# Patient Record
Sex: FEMALE | Race: WHITE | NOT HISPANIC OR LATINO | Employment: OTHER | URBAN - METROPOLITAN AREA
[De-identification: names, ages, dates, MRNs, and addresses within clinical notes are randomized per-mention and may not be internally consistent; named-entity substitution may affect disease eponyms.]

---

## 2017-01-20 ENCOUNTER — TRANSCRIBE ORDERS (OUTPATIENT)
Dept: SLEEP CENTER | Facility: CLINIC | Age: 77
End: 2017-01-20

## 2017-01-20 ENCOUNTER — ALLSCRIPTS OFFICE VISIT (OUTPATIENT)
Dept: OTHER | Facility: OTHER | Age: 77
End: 2017-01-20

## 2017-01-20 ENCOUNTER — HOSPITAL ENCOUNTER (OUTPATIENT)
Dept: SLEEP CENTER | Facility: CLINIC | Age: 77
Discharge: HOME | End: 2017-01-20
Payer: MEDICARE

## 2017-01-20 DIAGNOSIS — G47.33 OSA (OBSTRUCTIVE SLEEP APNEA): Primary | ICD-10-CM

## 2017-01-25 ENCOUNTER — HOSPITAL ENCOUNTER (OUTPATIENT)
Dept: SLEEP CENTER | Facility: CLINIC | Age: 77
Discharge: HOME | End: 2017-01-25
Payer: MEDICARE

## 2017-01-25 DIAGNOSIS — G47.33 OSA (OBSTRUCTIVE SLEEP APNEA): ICD-10-CM

## 2017-01-25 PROCEDURE — 95811 POLYSOM 6/>YRS CPAP 4/> PARM: CPT

## 2017-02-08 ENCOUNTER — APPOINTMENT (OUTPATIENT)
Dept: PHYSICAL THERAPY | Facility: CLINIC | Age: 77
End: 2017-02-08
Payer: MEDICARE

## 2017-02-08 PROCEDURE — G8979 MOBILITY GOAL STATUS: HCPCS

## 2017-02-08 PROCEDURE — 97161 PT EVAL LOW COMPLEX 20 MIN: CPT

## 2017-02-08 PROCEDURE — G8978 MOBILITY CURRENT STATUS: HCPCS

## 2017-02-13 ENCOUNTER — APPOINTMENT (OUTPATIENT)
Dept: PHYSICAL THERAPY | Facility: CLINIC | Age: 77
End: 2017-02-13
Payer: MEDICARE

## 2017-02-13 PROCEDURE — 97140 MANUAL THERAPY 1/> REGIONS: CPT

## 2017-02-13 PROCEDURE — 97110 THERAPEUTIC EXERCISES: CPT

## 2017-02-15 ENCOUNTER — APPOINTMENT (OUTPATIENT)
Dept: PHYSICAL THERAPY | Facility: CLINIC | Age: 77
End: 2017-02-15
Payer: MEDICARE

## 2017-02-15 PROCEDURE — 97110 THERAPEUTIC EXERCISES: CPT

## 2017-02-16 ENCOUNTER — TRANSCRIBE ORDERS (OUTPATIENT)
Dept: LAB | Facility: CLINIC | Age: 77
End: 2017-02-16

## 2017-02-16 ENCOUNTER — APPOINTMENT (OUTPATIENT)
Dept: LAB | Facility: CLINIC | Age: 77
End: 2017-02-16
Payer: MEDICARE

## 2017-02-16 DIAGNOSIS — R10.11 ABDOMINAL PAIN, RIGHT UPPER QUADRANT: Primary | ICD-10-CM

## 2017-02-16 DIAGNOSIS — R94.5 NONSPECIFIC ABNORMAL RESULTS OF LIVER FUNCTION STUDY: ICD-10-CM

## 2017-02-16 LAB
ALBUMIN SERPL BCP-MCNC: 3.7 G/DL (ref 3.5–5)
ALP SERPL-CCNC: 119 U/L (ref 46–116)
ALT SERPL W P-5'-P-CCNC: 57 U/L (ref 12–78)
AST SERPL W P-5'-P-CCNC: 22 U/L (ref 5–45)
BILIRUB DIRECT SERPL-MCNC: 0.14 MG/DL (ref 0–0.2)
BILIRUB SERPL-MCNC: 0.48 MG/DL (ref 0.2–1)
PROT SERPL-MCNC: 7.5 G/DL (ref 6.4–8.2)

## 2017-02-16 PROCEDURE — 36415 COLL VENOUS BLD VENIPUNCTURE: CPT

## 2017-02-16 PROCEDURE — 80076 HEPATIC FUNCTION PANEL: CPT

## 2017-02-20 ENCOUNTER — APPOINTMENT (OUTPATIENT)
Dept: PHYSICAL THERAPY | Facility: CLINIC | Age: 77
End: 2017-02-20
Payer: MEDICARE

## 2017-02-22 ENCOUNTER — APPOINTMENT (OUTPATIENT)
Dept: PHYSICAL THERAPY | Facility: CLINIC | Age: 77
End: 2017-02-22
Payer: MEDICARE

## 2017-02-22 PROCEDURE — 97110 THERAPEUTIC EXERCISES: CPT

## 2017-02-24 ENCOUNTER — APPOINTMENT (OUTPATIENT)
Dept: PHYSICAL THERAPY | Facility: CLINIC | Age: 77
End: 2017-02-24
Payer: MEDICARE

## 2017-02-24 PROCEDURE — 97110 THERAPEUTIC EXERCISES: CPT

## 2017-02-27 ENCOUNTER — APPOINTMENT (OUTPATIENT)
Dept: PHYSICAL THERAPY | Facility: CLINIC | Age: 77
End: 2017-02-27
Payer: MEDICARE

## 2017-02-27 PROCEDURE — 97110 THERAPEUTIC EXERCISES: CPT

## 2017-03-01 ENCOUNTER — APPOINTMENT (OUTPATIENT)
Dept: PHYSICAL THERAPY | Facility: CLINIC | Age: 77
End: 2017-03-01
Payer: MEDICARE

## 2017-03-01 PROCEDURE — 97110 THERAPEUTIC EXERCISES: CPT

## 2017-03-06 ENCOUNTER — APPOINTMENT (OUTPATIENT)
Dept: PHYSICAL THERAPY | Facility: CLINIC | Age: 77
End: 2017-03-06
Payer: MEDICARE

## 2017-03-06 PROCEDURE — 97110 THERAPEUTIC EXERCISES: CPT

## 2017-03-08 ENCOUNTER — APPOINTMENT (OUTPATIENT)
Dept: PHYSICAL THERAPY | Facility: CLINIC | Age: 77
End: 2017-03-08
Payer: MEDICARE

## 2017-03-08 PROCEDURE — 97110 THERAPEUTIC EXERCISES: CPT

## 2017-03-08 PROCEDURE — G8978 MOBILITY CURRENT STATUS: HCPCS

## 2017-03-08 PROCEDURE — G8979 MOBILITY GOAL STATUS: HCPCS

## 2017-03-13 ENCOUNTER — APPOINTMENT (OUTPATIENT)
Dept: PHYSICAL THERAPY | Facility: CLINIC | Age: 77
End: 2017-03-13
Payer: MEDICARE

## 2017-03-13 PROCEDURE — 97110 THERAPEUTIC EXERCISES: CPT

## 2017-03-15 ENCOUNTER — APPOINTMENT (OUTPATIENT)
Dept: PHYSICAL THERAPY | Facility: CLINIC | Age: 77
End: 2017-03-15
Payer: MEDICARE

## 2017-03-16 ENCOUNTER — TRANSCRIBE ORDERS (OUTPATIENT)
Dept: LAB | Facility: CLINIC | Age: 77
End: 2017-03-16

## 2017-03-16 ENCOUNTER — APPOINTMENT (OUTPATIENT)
Dept: LAB | Facility: CLINIC | Age: 77
End: 2017-03-16
Payer: MEDICARE

## 2017-03-16 DIAGNOSIS — R94.5 NONSPECIFIC ABNORMAL RESULTS OF LIVER FUNCTION STUDY: Primary | ICD-10-CM

## 2017-03-16 LAB
ALBUMIN SERPL BCP-MCNC: 3.7 G/DL (ref 3.5–5)
ALP SERPL-CCNC: 85 U/L (ref 46–116)
ALT SERPL W P-5'-P-CCNC: 24 U/L (ref 12–78)
AST SERPL W P-5'-P-CCNC: 17 U/L (ref 5–45)
BILIRUB DIRECT SERPL-MCNC: 0.09 MG/DL (ref 0–0.2)
BILIRUB SERPL-MCNC: 0.45 MG/DL (ref 0.2–1)
GGT SERPL-CCNC: 33 U/L (ref 5–85)
PROT SERPL-MCNC: 7.3 G/DL (ref 6.4–8.2)

## 2017-03-16 PROCEDURE — 36415 COLL VENOUS BLD VENIPUNCTURE: CPT

## 2017-03-16 PROCEDURE — 80076 HEPATIC FUNCTION PANEL: CPT

## 2017-03-16 PROCEDURE — 82977 ASSAY OF GGT: CPT

## 2017-03-22 ENCOUNTER — APPOINTMENT (OUTPATIENT)
Dept: PHYSICAL THERAPY | Facility: CLINIC | Age: 77
End: 2017-03-22
Payer: MEDICARE

## 2017-03-22 PROCEDURE — 97110 THERAPEUTIC EXERCISES: CPT

## 2017-04-07 ENCOUNTER — TRANSCRIBE ORDERS (OUTPATIENT)
Dept: ADMINISTRATIVE | Facility: HOSPITAL | Age: 77
End: 2017-04-07

## 2017-04-07 DIAGNOSIS — N13.5 UPJ OBSTRUCTION, ACQUIRED: Primary | ICD-10-CM

## 2017-04-11 ENCOUNTER — HOSPITAL ENCOUNTER (OUTPATIENT)
Dept: RADIOLOGY | Facility: HOSPITAL | Age: 77
Discharge: HOME | End: 2017-04-11
Attending: UROLOGY
Payer: MEDICARE

## 2017-04-11 DIAGNOSIS — N13.5 UPJ OBSTRUCTION, ACQUIRED: ICD-10-CM

## 2017-04-11 PROCEDURE — 76770 US EXAM ABDO BACK WALL COMP: CPT

## 2017-05-23 ENCOUNTER — APPOINTMENT (OUTPATIENT)
Dept: LAB | Facility: CLINIC | Age: 77
End: 2017-05-23
Payer: MEDICARE

## 2017-05-23 ENCOUNTER — TRANSCRIBE ORDERS (OUTPATIENT)
Dept: LAB | Facility: CLINIC | Age: 77
End: 2017-05-23

## 2017-05-23 DIAGNOSIS — E03.9 UNSPECIFIED HYPOTHYROIDISM: ICD-10-CM

## 2017-05-23 DIAGNOSIS — I10 ESSENTIAL HYPERTENSION, MALIGNANT: Primary | ICD-10-CM

## 2017-05-23 LAB
ALBUMIN SERPL BCP-MCNC: 3.8 G/DL (ref 3.5–5)
ALP SERPL-CCNC: 83 U/L (ref 46–116)
ALT SERPL W P-5'-P-CCNC: 27 U/L (ref 12–78)
ANION GAP SERPL CALCULATED.3IONS-SCNC: 7 MMOL/L (ref 4–13)
AST SERPL W P-5'-P-CCNC: 19 U/L (ref 5–45)
BASOPHILS # BLD AUTO: 0 THOUSANDS/ΜL (ref 0–0.1)
BASOPHILS NFR BLD AUTO: 0 % (ref 0–1)
BILIRUB SERPL-MCNC: 0.42 MG/DL (ref 0.2–1)
BILIRUB UR QL STRIP: NEGATIVE
BUN SERPL-MCNC: 25 MG/DL (ref 5–25)
CALCIUM SERPL-MCNC: 9.4 MG/DL (ref 8.3–10.1)
CHLORIDE SERPL-SCNC: 104 MMOL/L (ref 100–108)
CHOLEST SERPL-MCNC: 270 MG/DL (ref 50–200)
CLARITY UR: CLEAR
CO2 SERPL-SCNC: 28 MMOL/L (ref 21–32)
COLOR UR: YELLOW
CREAT SERPL-MCNC: 0.57 MG/DL (ref 0.6–1.3)
EOSINOPHIL # BLD AUTO: 0.23 THOUSAND/ΜL (ref 0–0.61)
EOSINOPHIL NFR BLD AUTO: 4 % (ref 0–6)
ERYTHROCYTE [DISTWIDTH] IN BLOOD BY AUTOMATED COUNT: 14 % (ref 11.6–15.1)
GFR SERPL CREATININE-BSD FRML MDRD: >60 ML/MIN/1.73SQ M
GLUCOSE P FAST SERPL-MCNC: 88 MG/DL (ref 65–99)
GLUCOSE UR STRIP-MCNC: NEGATIVE MG/DL
HCT VFR BLD AUTO: 41.7 % (ref 34.8–46.1)
HDLC SERPL-MCNC: 52 MG/DL (ref 40–60)
HGB BLD-MCNC: 13.4 G/DL (ref 11.5–15.4)
HGB UR QL STRIP.AUTO: NEGATIVE
KETONES UR STRIP-MCNC: NEGATIVE MG/DL
LDLC SERPL CALC-MCNC: 185 MG/DL (ref 0–100)
LEUKOCYTE ESTERASE UR QL STRIP: NEGATIVE
LYMPHOCYTES # BLD AUTO: 2.74 THOUSANDS/ΜL (ref 0.6–4.47)
LYMPHOCYTES NFR BLD AUTO: 49 % (ref 14–44)
MCH RBC QN AUTO: 27 PG (ref 26.8–34.3)
MCHC RBC AUTO-ENTMCNC: 32.1 G/DL (ref 31.4–37.4)
MCV RBC AUTO: 84 FL (ref 82–98)
MONOCYTES # BLD AUTO: 0.33 THOUSAND/ΜL (ref 0.17–1.22)
MONOCYTES NFR BLD AUTO: 6 % (ref 4–12)
NEUTROPHILS # BLD AUTO: 2.27 THOUSANDS/ΜL (ref 1.85–7.62)
NEUTS SEG NFR BLD AUTO: 41 % (ref 43–75)
NITRITE UR QL STRIP: NEGATIVE
NRBC BLD AUTO-RTO: 0 /100 WBCS
PH UR STRIP.AUTO: 7.5 [PH] (ref 4.5–8)
PLATELET # BLD AUTO: 202 THOUSANDS/UL (ref 149–390)
PMV BLD AUTO: 11.4 FL (ref 8.9–12.7)
POTASSIUM SERPL-SCNC: 4.3 MMOL/L (ref 3.5–5.3)
PROT SERPL-MCNC: 7.4 G/DL (ref 6.4–8.2)
PROT UR STRIP-MCNC: NEGATIVE MG/DL
RBC # BLD AUTO: 4.97 MILLION/UL (ref 3.81–5.12)
SODIUM SERPL-SCNC: 139 MMOL/L (ref 136–145)
SP GR UR STRIP.AUTO: 1.02 (ref 1–1.03)
TRIGL SERPL-MCNC: 164 MG/DL
TSH SERPL DL<=0.05 MIU/L-ACNC: 1.99 UIU/ML (ref 0.36–3.74)
UROBILINOGEN UR QL STRIP.AUTO: 0.2 E.U./DL
WBC # BLD AUTO: 5.59 THOUSAND/UL (ref 4.31–10.16)

## 2017-05-23 PROCEDURE — 80061 LIPID PANEL: CPT

## 2017-05-23 PROCEDURE — 84443 ASSAY THYROID STIM HORMONE: CPT

## 2017-05-23 PROCEDURE — 80053 COMPREHEN METABOLIC PANEL: CPT

## 2017-05-23 PROCEDURE — 81003 URINALYSIS AUTO W/O SCOPE: CPT

## 2017-05-23 PROCEDURE — 36415 COLL VENOUS BLD VENIPUNCTURE: CPT

## 2017-05-23 PROCEDURE — 85025 COMPLETE CBC W/AUTO DIFF WBC: CPT

## 2017-11-27 ENCOUNTER — APPOINTMENT (OUTPATIENT)
Dept: LAB | Facility: CLINIC | Age: 77
End: 2017-11-27
Payer: MEDICARE

## 2017-11-27 DIAGNOSIS — Z79.899 NEED FOR PROPHYLACTIC CHEMOTHERAPY: ICD-10-CM

## 2017-11-27 DIAGNOSIS — M80.00XA OSTEOPOROSIS WITH PATHOLOGICAL FRACTURE, INITIAL ENCOUNTER: Primary | ICD-10-CM

## 2017-11-27 LAB
25(OH)D3 SERPL-MCNC: 25.4 NG/ML (ref 30–100)
ANION GAP SERPL CALCULATED.3IONS-SCNC: 6 MMOL/L (ref 4–13)
BUN SERPL-MCNC: 18 MG/DL (ref 5–25)
CALCIUM SERPL-MCNC: 9.2 MG/DL (ref 8.3–10.1)
CHLORIDE SERPL-SCNC: 103 MMOL/L (ref 100–108)
CO2 SERPL-SCNC: 28 MMOL/L (ref 21–32)
CREAT SERPL-MCNC: 0.52 MG/DL (ref 0.6–1.3)
GFR SERPL CREATININE-BSD FRML MDRD: 92 ML/MIN/1.73SQ M
GLUCOSE SERPL-MCNC: 91 MG/DL (ref 65–140)
POTASSIUM SERPL-SCNC: 4.1 MMOL/L (ref 3.5–5.3)
SODIUM SERPL-SCNC: 137 MMOL/L (ref 136–145)

## 2017-11-27 PROCEDURE — 82306 VITAMIN D 25 HYDROXY: CPT

## 2017-11-27 PROCEDURE — 36415 COLL VENOUS BLD VENIPUNCTURE: CPT

## 2017-11-27 PROCEDURE — 80048 BASIC METABOLIC PNL TOTAL CA: CPT

## 2018-01-14 VITALS
SYSTOLIC BLOOD PRESSURE: 112 MMHG | DIASTOLIC BLOOD PRESSURE: 82 MMHG | TEMPERATURE: 97.9 F | WEIGHT: 154 LBS | HEIGHT: 56 IN | BODY MASS INDEX: 34.64 KG/M2 | RESPIRATION RATE: 12 BRPM | HEART RATE: 81 BPM | OXYGEN SATURATION: 98 %

## 2018-04-13 ENCOUNTER — OFFICE VISIT (OUTPATIENT)
Dept: LAB | Facility: HOSPITAL | Age: 78
End: 2018-04-13
Payer: MEDICARE

## 2018-04-13 ENCOUNTER — TRANSCRIBE ORDERS (OUTPATIENT)
Dept: ADMINISTRATIVE | Facility: HOSPITAL | Age: 78
End: 2018-04-13

## 2018-04-13 ENCOUNTER — APPOINTMENT (OUTPATIENT)
Dept: LAB | Facility: HOSPITAL | Age: 78
End: 2018-04-13
Payer: MEDICARE

## 2018-04-13 DIAGNOSIS — Z01.818 PREOP EXAMINATION: ICD-10-CM

## 2018-04-13 DIAGNOSIS — M18.11 PRIMARY OSTEOARTHRITIS OF FIRST CARPOMETACARPAL JOINT OF RIGHT HAND: ICD-10-CM

## 2018-04-13 DIAGNOSIS — M18.11 PRIMARY OSTEOARTHRITIS OF FIRST CARPOMETACARPAL JOINT OF RIGHT HAND: Primary | ICD-10-CM

## 2018-04-13 LAB
ALBUMIN SERPL BCP-MCNC: 3.5 G/DL (ref 3.5–5)
ALP SERPL-CCNC: 64 U/L (ref 46–116)
ALT SERPL W P-5'-P-CCNC: 25 U/L (ref 12–78)
ANION GAP SERPL CALCULATED.3IONS-SCNC: 5 MMOL/L (ref 4–13)
AST SERPL W P-5'-P-CCNC: 14 U/L (ref 5–45)
BASOPHILS # BLD AUTO: 0 THOUSANDS/ΜL (ref 0–0.1)
BASOPHILS NFR BLD AUTO: 0 % (ref 0–1)
BILIRUB SERPL-MCNC: 0.3 MG/DL (ref 0.2–1)
BUN SERPL-MCNC: 26 MG/DL (ref 5–25)
CALCIUM SERPL-MCNC: 9.2 MG/DL
CHLORIDE SERPL-SCNC: 104 MMOL/L (ref 100–108)
CO2 SERPL-SCNC: 28 MMOL/L (ref 21–32)
CREAT SERPL-MCNC: 0.82 MG/DL (ref 0.6–1.3)
EOSINOPHIL # BLD AUTO: 0.1 THOUSAND/ΜL (ref 0–0.61)
EOSINOPHIL NFR BLD AUTO: 2 % (ref 0–6)
ERYTHROCYTE [DISTWIDTH] IN BLOOD BY AUTOMATED COUNT: 14 % (ref 11.6–15.1)
GFR SERPL CREATININE-BSD FRML MDRD: 68 ML/MIN/1.73SQ M
GLUCOSE SERPL-MCNC: 108 MG/DL (ref 65–140)
HCT VFR BLD AUTO: 40 % (ref 37–47)
HGB BLD-MCNC: 13.1 G/DL (ref 12–16)
LYMPHOCYTES # BLD AUTO: 2.5 THOUSANDS/ΜL (ref 0.6–4.47)
LYMPHOCYTES NFR BLD AUTO: 40 % (ref 14–44)
MCH RBC QN AUTO: 27.7 PG (ref 27–31)
MCHC RBC AUTO-ENTMCNC: 32.8 G/DL (ref 31.4–37.4)
MCV RBC AUTO: 84 FL (ref 82–98)
MONOCYTES # BLD AUTO: 0.4 THOUSAND/ΜL (ref 0.17–1.22)
MONOCYTES NFR BLD AUTO: 6 % (ref 4–12)
NEUTROPHILS # BLD AUTO: 3.2 THOUSANDS/ΜL (ref 1.85–7.62)
NEUTS SEG NFR BLD AUTO: 52 % (ref 43–75)
NRBC BLD AUTO-RTO: 0 /100 WBCS
PLATELET # BLD AUTO: 187 THOUSANDS/UL (ref 130–400)
PMV BLD AUTO: 9.4 FL (ref 8.9–12.7)
POTASSIUM SERPL-SCNC: 3.8 MMOL/L (ref 3.5–5.3)
PROT SERPL-MCNC: 6.9 G/DL (ref 6.4–8.2)
RBC # BLD AUTO: 4.74 MILLION/UL (ref 4.2–5.4)
SODIUM SERPL-SCNC: 137 MMOL/L (ref 136–145)
WBC # BLD AUTO: 6.3 THOUSAND/UL (ref 4.8–10.8)

## 2018-04-13 PROCEDURE — 85025 COMPLETE CBC W/AUTO DIFF WBC: CPT

## 2018-04-13 PROCEDURE — 80053 COMPREHEN METABOLIC PANEL: CPT

## 2018-04-13 PROCEDURE — 36415 COLL VENOUS BLD VENIPUNCTURE: CPT

## 2018-04-13 PROCEDURE — 93005 ELECTROCARDIOGRAM TRACING: CPT

## 2018-04-16 LAB
ATRIAL RATE: 69 BPM
P AXIS: 53 DEGREES
PR INTERVAL: 192 MS
QRS AXIS: -12 DEGREES
QRSD INTERVAL: 84 MS
QT INTERVAL: 392 MS
QTC INTERVAL: 420 MS
T WAVE AXIS: 34 DEGREES
VENTRICULAR RATE: 69 BPM

## 2018-04-16 PROCEDURE — 93010 ELECTROCARDIOGRAM REPORT: CPT | Performed by: INTERNAL MEDICINE

## 2018-07-11 ENCOUNTER — APPOINTMENT (OUTPATIENT)
Dept: LAB | Facility: CLINIC | Age: 78
End: 2018-07-11
Payer: MEDICARE

## 2018-07-11 DIAGNOSIS — I10 ESSENTIAL HYPERTENSION, MALIGNANT: ICD-10-CM

## 2018-07-11 DIAGNOSIS — I51.9 MYXEDEMA HEART DISEASE: Primary | ICD-10-CM

## 2018-07-11 DIAGNOSIS — E03.9 MYXEDEMA HEART DISEASE: Primary | ICD-10-CM

## 2018-07-11 LAB
ALBUMIN SERPL BCP-MCNC: 3.5 G/DL (ref 3.5–5)
ALP SERPL-CCNC: 55 U/L (ref 46–116)
ALT SERPL W P-5'-P-CCNC: 25 U/L (ref 12–78)
ANION GAP SERPL CALCULATED.3IONS-SCNC: 5 MMOL/L (ref 4–13)
AST SERPL W P-5'-P-CCNC: 17 U/L (ref 5–45)
BASOPHILS # BLD AUTO: 0.01 THOUSANDS/ΜL (ref 0–0.1)
BASOPHILS NFR BLD AUTO: 0 % (ref 0–1)
BILIRUB SERPL-MCNC: 0.49 MG/DL (ref 0.2–1)
BILIRUB UR QL STRIP: NEGATIVE
BUN SERPL-MCNC: 22 MG/DL (ref 5–25)
CALCIUM SERPL-MCNC: 9.1 MG/DL (ref 8.3–10.1)
CHLORIDE SERPL-SCNC: 103 MMOL/L (ref 100–108)
CLARITY UR: CLEAR
CO2 SERPL-SCNC: 29 MMOL/L (ref 21–32)
COLOR UR: YELLOW
CREAT SERPL-MCNC: 0.57 MG/DL (ref 0.6–1.3)
EOSINOPHIL # BLD AUTO: 0.16 THOUSAND/ΜL (ref 0–0.61)
EOSINOPHIL NFR BLD AUTO: 3 % (ref 0–6)
ERYTHROCYTE [DISTWIDTH] IN BLOOD BY AUTOMATED COUNT: 14 % (ref 11.6–15.1)
GFR SERPL CREATININE-BSD FRML MDRD: 88 ML/MIN/1.73SQ M
GLUCOSE SERPL-MCNC: 89 MG/DL (ref 65–140)
GLUCOSE UR STRIP-MCNC: NEGATIVE MG/DL
HCT VFR BLD AUTO: 40.4 % (ref 34.8–46.1)
HGB BLD-MCNC: 13 G/DL (ref 11.5–15.4)
HGB UR QL STRIP.AUTO: NEGATIVE
IMM GRANULOCYTES # BLD AUTO: 0.02 THOUSAND/UL (ref 0–0.2)
IMM GRANULOCYTES NFR BLD AUTO: 0 % (ref 0–2)
KETONES UR STRIP-MCNC: NEGATIVE MG/DL
LEUKOCYTE ESTERASE UR QL STRIP: NEGATIVE
LYMPHOCYTES # BLD AUTO: 2.13 THOUSANDS/ΜL (ref 0.6–4.47)
LYMPHOCYTES NFR BLD AUTO: 38 % (ref 14–44)
MCH RBC QN AUTO: 28.2 PG (ref 26.8–34.3)
MCHC RBC AUTO-ENTMCNC: 32.2 G/DL (ref 31.4–37.4)
MCV RBC AUTO: 88 FL (ref 82–98)
MONOCYTES # BLD AUTO: 0.41 THOUSAND/ΜL (ref 0.17–1.22)
MONOCYTES NFR BLD AUTO: 7 % (ref 4–12)
NEUTROPHILS # BLD AUTO: 2.94 THOUSANDS/ΜL (ref 1.85–7.62)
NEUTS SEG NFR BLD AUTO: 52 % (ref 43–75)
NITRITE UR QL STRIP: NEGATIVE
NRBC BLD AUTO-RTO: 0 /100 WBCS
PH UR STRIP.AUTO: 7.5 [PH] (ref 4.5–8)
PLATELET # BLD AUTO: 172 THOUSANDS/UL (ref 149–390)
PMV BLD AUTO: 12.5 FL (ref 8.9–12.7)
POTASSIUM SERPL-SCNC: 4.3 MMOL/L (ref 3.5–5.3)
PROT SERPL-MCNC: 6.9 G/DL (ref 6.4–8.2)
PROT UR STRIP-MCNC: NEGATIVE MG/DL
RBC # BLD AUTO: 4.61 MILLION/UL (ref 3.81–5.12)
SODIUM SERPL-SCNC: 137 MMOL/L (ref 136–145)
SP GR UR STRIP.AUTO: 1.01 (ref 1–1.03)
TSH SERPL DL<=0.05 MIU/L-ACNC: 0.97 UIU/ML (ref 0.36–3.74)
UROBILINOGEN UR QL STRIP.AUTO: 0.2 E.U./DL
WBC # BLD AUTO: 5.67 THOUSAND/UL (ref 4.31–10.16)

## 2018-07-11 PROCEDURE — 81003 URINALYSIS AUTO W/O SCOPE: CPT

## 2018-07-11 PROCEDURE — 84443 ASSAY THYROID STIM HORMONE: CPT

## 2018-07-11 PROCEDURE — 36415 COLL VENOUS BLD VENIPUNCTURE: CPT

## 2018-07-11 PROCEDURE — 80053 COMPREHEN METABOLIC PANEL: CPT

## 2018-07-11 PROCEDURE — 85025 COMPLETE CBC W/AUTO DIFF WBC: CPT

## 2018-08-24 ENCOUNTER — APPOINTMENT (OUTPATIENT)
Dept: LAB | Facility: CLINIC | Age: 78
End: 2018-08-24
Payer: MEDICARE

## 2018-08-24 DIAGNOSIS — E03.9 MYXEDEMA HEART DISEASE: ICD-10-CM

## 2018-08-24 DIAGNOSIS — I51.9 MYXEDEMA HEART DISEASE: ICD-10-CM

## 2018-08-24 DIAGNOSIS — E78.00 PURE HYPERCHOLESTEROLEMIA: Primary | ICD-10-CM

## 2018-08-24 LAB
CHOLEST SERPL-MCNC: 150 MG/DL (ref 50–200)
HDLC SERPL-MCNC: 64 MG/DL (ref 40–60)
LDLC SERPL CALC-MCNC: 74 MG/DL (ref 0–100)
NONHDLC SERPL-MCNC: 86 MG/DL
TRIGL SERPL-MCNC: 61 MG/DL
TSH SERPL DL<=0.05 MIU/L-ACNC: 2.11 UIU/ML (ref 0.36–3.74)

## 2018-08-24 PROCEDURE — 36415 COLL VENOUS BLD VENIPUNCTURE: CPT

## 2018-08-24 PROCEDURE — 80061 LIPID PANEL: CPT

## 2018-08-24 PROCEDURE — 84443 ASSAY THYROID STIM HORMONE: CPT

## 2018-11-20 RX ORDER — CARVEDILOL 3.12 MG/1
3.12 TABLET ORAL 2 TIMES DAILY WITH MEALS
COMMUNITY

## 2018-11-20 RX ORDER — ATORVASTATIN CALCIUM 10 MG/1
10 TABLET, FILM COATED ORAL DAILY
COMMUNITY

## 2018-11-20 NOTE — PRE-PROCEDURE INSTRUCTIONS
Pre-Surgery Instructions:   Medication Instructions    acetaminophen (TYLENOL) 325 mg tablet Instructed patient per Anesthesia Guidelines   amLODIPine (NORVASC) 5 mg tablet Instructed patient per Anesthesia Guidelines   atorvastatin (LIPITOR) 10 mg tablet Instructed patient per Anesthesia Guidelines   Calcium Carbonate (CALTRATE 600 PO) Instructed patient per Anesthesia Guidelines   carvedilol (COREG CR) 40 MG 24 hr capsule Instructed patient per Anesthesia Guidelines   carvedilol (COREG) 3 125 mg tablet Instructed patient per Anesthesia Guidelines   Cholecalciferol (VITAMIN D-3 PO) Instructed patient per Anesthesia Guidelines   levothyroxine (SYNTHROID) 88 mcg tablet Instructed patient per Anesthesia Guidelines   Multiple Vitamins-Minerals (PRESERVISION AREDS PO) Instructed patient per Anesthesia Guidelines   polyethylene glycol (MIRALAX) 17 g packet Instructed patient per Anesthesia Guidelines   quinapril (ACCUPRIL) 40 MG tablet Instructed patient per Anesthesia Guidelines  Pre op instructions reviewed with pt via phone  Instructed to take quinapril, levothyroxine, carvedilol and amlodipine a m of surgery   kika Oglesby [phone]  Please note pt is on two different doses of carvedilol simultaneously per cardiologists instructions My Surgical Experience    The following information was developed to assist you to prepare for your operation  What do I need to do before coming to the hospital?   Arrange for a responsible person to drive you to and from the hospital    Arrange care for your children at home  Children are not allowed in the recovery areas of the hospital   Plan to wear clothing that is easy to put on and take off  If you are having shoulder surgery, wear a shirt that buttons or zippers in the front  Bathing  o Shower the evening before and the morning of your surgery with an antibacterial soap   Please refer to the Pre Op Showering Instructions for Surgery Patients Sheet   o Remove nail polish and all body piercing jewelry  o Do not shave any body part for at least 24 hours before surgery-this includes face, arms, legs and upper body  Food  o Nothing to eat or drink after midnight the night before your surgery  This includes candy and chewing gum  o Exception: If your surgery is after 12:00pm (noon), you may have clear liquids such as 7-Up®, ginger ale, apple or cranberry juice, Jell-O®, water, or clear broth until 8:00 am  o Do not drink milk or juice with pulp on the morning before surgery  o Do not drink alcohol 24 hours before surgery  Medicine  o Follow instructions you received from your surgeon about which medicines you may take on the day of surgery  o If instructed to take medicine on the morning of surgery, take pills with just a small sip of water  Call your prescribing doctor for specific information on what to do if you take insulin    What should I bring to the hospital?    Bring:  Jhon Larch or a walker, if you have them, for foot or knee surgery   A list of the daily medicines, vitamins, minerals, herbals and nutritional supplements you take  Include the dosages of medicines and the time you take them each day   Glasses, dentures or hearing aids   Minimal clothing; you will be wearing hospital sleepwear   Photo ID; required to verify your identity   If you have a Living Will or Power of , bring a copy of the documents   If you have an ostomy, bring an extra pouch and any supplies you use    Do not bring   Medicines or inhalers   Money, valuables or jewelry    What other information should I know about the day of surgery?  Notify your surgeons if you develop a cold, sore throat, cough, fever, rash or any other illness     Report to the Ambulatory Surgical/Same Day Surgery Unit   You will be instructed to stop at Registration only if you have not been pre-registered   Inform your  fi they do not stay that they will be asked by the staff to leave a phone number where they can be reached   Be available to be reached before surgery  In the event the operating room schedule changes, you may be asked to come in earlier or later than expected    *It is important to tell your doctor and others involved in your health care if you are taking or have been taking any non-prescription drugs, vitamins, minerals, herbals or other nutritional supplements   Any of these may interact with some food or medicines and cause a reaction

## 2018-11-25 ENCOUNTER — ANESTHESIA EVENT (OUTPATIENT)
Dept: PERIOP | Facility: AMBULARY SURGERY CENTER | Age: 78
End: 2018-11-25
Payer: MEDICARE

## 2018-11-25 NOTE — ANESTHESIA PREPROCEDURE EVALUATION
PONV (postoperative nausea and vomiting)    Back pain with radiation to bilat legs   Infectious viral hepatitis    Hiatal hernia    De Santiago's esophagus    Chronic GERD    Diverticulitis    Sinus congestion    Hyperlipidemia    Fibromyalgia syndrome    Disease of thyroid gland hypo   Arthritis osteo   Diabetes mellitus (HCC) diet controlled   Chronic uveitis bilateral   Sleep apnea CPAP rx, does not use mask   Hx of colonoscopy EGD done simultaneously   CPAP (continuous positive airway pressure) dependence    Fibromyalgia, primary    Autoimmune hepatitis ([facility] [address] )        Review of Systems/Medical History  Patient summary reviewed  Chart reviewed  History of anesthetic complications PONV    Cardiovascular  Hyperlipidemia, Hypertension ,    Pulmonary  Sleep apnea CPAP,        GI/Hepatic    GERD , Liver disease , Hepatitis ,  Hiatal hernia,             Endo/Other  Diabetes , History of thyroid disease ,   Obesity    GYN       Hematology   Musculoskeletal  Back pain ,   Arthritis     Neurology   Psychology           Physical Exam    Airway    Mallampati score: II  TM Distance: >3 FB  Neck ROM: full     Dental       Cardiovascular  Rhythm: regular, Rate: normal,     Pulmonary  Breath sounds clear to auscultation,     Other Findings        Anesthesia Plan  ASA Score- 3     Anesthesia Type- IV sedation with anesthesia with ASA Monitors  Additional Monitors:   Airway Plan:         Plan Factors-    Induction- intravenous  Postoperative Plan-     Informed Consent- Anesthetic plan and risks discussed with patient

## 2018-11-26 ENCOUNTER — ANESTHESIA (OUTPATIENT)
Dept: PERIOP | Facility: AMBULARY SURGERY CENTER | Age: 78
End: 2018-11-26
Payer: MEDICARE

## 2018-11-26 ENCOUNTER — HOSPITAL ENCOUNTER (OUTPATIENT)
Facility: AMBULARY SURGERY CENTER | Age: 78
Setting detail: OUTPATIENT SURGERY
Discharge: HOME | End: 2018-11-26
Attending: OPHTHALMOLOGY | Admitting: OPHTHALMOLOGY
Payer: MEDICARE

## 2018-11-26 VITALS
TEMPERATURE: 96.8 F | WEIGHT: 155 LBS | BODY MASS INDEX: 32.54 KG/M2 | HEART RATE: 69 BPM | RESPIRATION RATE: 18 BRPM | SYSTOLIC BLOOD PRESSURE: 142 MMHG | HEIGHT: 58 IN | OXYGEN SATURATION: 96 % | DIASTOLIC BLOOD PRESSURE: 70 MMHG

## 2018-11-26 DIAGNOSIS — H25.12 AGE-RELATED NUCLEAR CATARACT OF LEFT EYE: Primary | ICD-10-CM

## 2018-11-26 PROCEDURE — V2632 POST CHMBR INTRAOCULAR LENS: HCPCS | Performed by: OPHTHALMOLOGY

## 2018-11-26 DEVICE — IOL SN60WF 21.5: Type: IMPLANTABLE DEVICE | Site: EYE | Status: FUNCTIONAL

## 2018-11-26 RX ORDER — GATIFLOXACIN 5 MG/ML
1 SOLUTION/ DROPS OPHTHALMIC 2 TIMES DAILY
Qty: 3 ML | Refills: 0
Start: 2018-11-26

## 2018-11-26 RX ORDER — TETRACAINE HYDROCHLORIDE 5 MG/ML
1 SOLUTION OPHTHALMIC ONCE
Status: COMPLETED | OUTPATIENT
Start: 2018-11-26 | End: 2018-11-26

## 2018-11-26 RX ORDER — PHENYLEPHRINE HCL 2.5 %
1 DROPS OPHTHALMIC (EYE)
Status: COMPLETED | OUTPATIENT
Start: 2018-11-26 | End: 2018-11-26

## 2018-11-26 RX ORDER — MIDAZOLAM HYDROCHLORIDE 1 MG/ML
INJECTION INTRAMUSCULAR; INTRAVENOUS AS NEEDED
Status: DISCONTINUED | OUTPATIENT
Start: 2018-11-26 | End: 2018-11-26 | Stop reason: SURG

## 2018-11-26 RX ORDER — LIDOCAINE HYDROCHLORIDE 10 MG/ML
INJECTION, SOLUTION EPIDURAL; INFILTRATION; INTRACAUDAL; PERINEURAL AS NEEDED
Status: DISCONTINUED | OUTPATIENT
Start: 2018-11-26 | End: 2018-11-26 | Stop reason: HOSPADM

## 2018-11-26 RX ORDER — GATIFLOXACIN 5 MG/ML
SOLUTION/ DROPS OPHTHALMIC AS NEEDED
Status: DISCONTINUED | OUTPATIENT
Start: 2018-11-26 | End: 2018-11-26 | Stop reason: HOSPADM

## 2018-11-26 RX ORDER — ONDANSETRON 2 MG/ML
4 INJECTION INTRAMUSCULAR; INTRAVENOUS ONCE AS NEEDED
Status: DISCONTINUED | OUTPATIENT
Start: 2018-11-26 | End: 2018-11-26 | Stop reason: HOSPADM

## 2018-11-26 RX ORDER — BALANCED SALT SOLUTION 6.4; .75; .48; .3; 3.9; 1.7 MG/ML; MG/ML; MG/ML; MG/ML; MG/ML; MG/ML
SOLUTION OPHTHALMIC AS NEEDED
Status: DISCONTINUED | OUTPATIENT
Start: 2018-11-26 | End: 2018-11-26 | Stop reason: HOSPADM

## 2018-11-26 RX ORDER — FENTANYL CITRATE/PF 50 MCG/ML
25 SYRINGE (ML) INJECTION
Status: DISCONTINUED | OUTPATIENT
Start: 2018-11-26 | End: 2018-11-26 | Stop reason: HOSPADM

## 2018-11-26 RX ORDER — TETRACAINE HYDROCHLORIDE 5 MG/ML
SOLUTION OPHTHALMIC AS NEEDED
Status: DISCONTINUED | OUTPATIENT
Start: 2018-11-26 | End: 2018-11-26 | Stop reason: HOSPADM

## 2018-11-26 RX ORDER — PREDNISOLONE ACETATE 10 MG/ML
1 SUSPENSION/ DROPS OPHTHALMIC 4 TIMES DAILY
Qty: 5 ML | Refills: 0
Start: 2018-11-26

## 2018-11-26 RX ORDER — LIDOCAINE HYDROCHLORIDE 20 MG/ML
1 JELLY TOPICAL
Status: COMPLETED | OUTPATIENT
Start: 2018-11-26 | End: 2018-11-26

## 2018-11-26 RX ORDER — CYCLOPENTOLATE HYDROCHLORIDE 10 MG/ML
1 SOLUTION/ DROPS OPHTHALMIC
Status: COMPLETED | OUTPATIENT
Start: 2018-11-26 | End: 2018-11-26

## 2018-11-26 RX ADMIN — MIDAZOLAM HYDROCHLORIDE 1 MG: 1 INJECTION, SOLUTION INTRAMUSCULAR; INTRAVENOUS at 07:30

## 2018-11-26 RX ADMIN — PHENYLEPHRINE HYDROCHLORIDE 1 DROP: 25 SOLUTION/ DROPS OPHTHALMIC at 07:30

## 2018-11-26 RX ADMIN — TETRACAINE HYDROCHLORIDE 1 DROP: 5 SOLUTION OPHTHALMIC at 06:45

## 2018-11-26 RX ADMIN — CYCLOPENTOLATE HYDROCHLORIDE 1 DROP: 10 SOLUTION/ DROPS OPHTHALMIC at 07:00

## 2018-11-26 RX ADMIN — LIDOCAINE HYDROCHLORIDE 1 APPLICATION: 20 JELLY TOPICAL at 07:30

## 2018-11-26 RX ADMIN — CYCLOPENTOLATE HYDROCHLORIDE 1 DROP: 10 SOLUTION/ DROPS OPHTHALMIC at 07:30

## 2018-11-26 RX ADMIN — MIDAZOLAM HYDROCHLORIDE 1 MG: 1 INJECTION, SOLUTION INTRAMUSCULAR; INTRAVENOUS at 07:39

## 2018-11-26 RX ADMIN — LIDOCAINE HYDROCHLORIDE 1 APPLICATION: 20 JELLY TOPICAL at 07:14

## 2018-11-26 RX ADMIN — LIDOCAINE HYDROCHLORIDE 1 APPLICATION: 20 JELLY TOPICAL at 06:44

## 2018-11-26 RX ADMIN — PHENYLEPHRINE HYDROCHLORIDE 1 DROP: 25 SOLUTION/ DROPS OPHTHALMIC at 07:14

## 2018-11-26 RX ADMIN — CYCLOPENTOLATE HYDROCHLORIDE 1 DROP: 10 SOLUTION/ DROPS OPHTHALMIC at 07:14

## 2018-11-26 RX ADMIN — LIDOCAINE HYDROCHLORIDE 1 APPLICATION: 20 JELLY TOPICAL at 07:00

## 2018-11-26 RX ADMIN — PHENYLEPHRINE HYDROCHLORIDE 1 DROP: 25 SOLUTION/ DROPS OPHTHALMIC at 07:00

## 2018-11-26 RX ADMIN — PHENYLEPHRINE HYDROCHLORIDE 1 DROP: 25 SOLUTION/ DROPS OPHTHALMIC at 06:45

## 2018-11-26 RX ADMIN — CYCLOPENTOLATE HYDROCHLORIDE 1 DROP: 10 SOLUTION/ DROPS OPHTHALMIC at 06:44

## 2018-11-26 NOTE — OP NOTE
OPERATIVE REPORT    PATIENT NAME: Lacey Medley    :  1940  MRN: [id number]  Pt Location: [facility] OR ROOM 01    Surgery Date: 2018    Surgeon(s) and Role:     * Consuella Brunner, MD - Primary    Age-related nuclear cataract of left eye [H25 12]    Post-Op Diagnosis Codes:     * Age-related nuclear cataract of left eye [H25 12]    Procedure(s):  EXTRACTION EXTRACAPSULAR CATARACT PHACO INTRAOCULAR LENS (IOL)    Anesthesia Type:   IV Sedation with Anesthesia    Operative Indications:  Age-related nuclear cataract of left eye [H25 12]  Decreased vision to 20/40  With problems glare and driving  Pt requested cataract sx the left eye    Procedure and Technique:    Procedure Details     The patient was brought in the OR in stable condition and placed on the operative table  The left eye was prepped and draped in the usual sterile manner  Attention was directed to the left eye where a lid speculum was placed  A 2 4 mm clear corneal incision was made temperally  1/2 cc of 1% MPF Lidocaine was irrigated into the anterior chamber followed by viscoat  The side port incision was placed superiorly  The capsularrhexis was made and the nucleus was hydrodissected with BSS  The nucleus was then removed with the phaco handpiece followed by removal of the cortical material with the I/A handpiece  The capsular bag was then filled with Provisc  The IOL was folded and placed in to the capsular bag and centered well  The remaining Provisc was removed from the eye with the I/A  The wounds were hydrated with BSS and found to be water tight  The lid speculum was removed and 2 drops of Gatifloxicin were placed over the cornea  A protective eye shield was taped over the eye and the patient went to PACU in stable condition  I will see the patient in the office tomorrow and the expected post op period is a few weeks         Complications: None        Disposition: PACU   Condition: Stable    SIGNATURE: Consuella Brunner, MD  DATE: November 26, 2018  TIME: 8:01 AM

## 2018-11-26 NOTE — DISCHARGE INSTRUCTIONS
Dr Enma Lindsay Cataract Instructions    Activity:     1  No Driving until instructed   2  Keep shield on until seen tomorrow except when administering drops   3  No heavy lifting   4  No water in eye     Diet:     1  Resume normal diet    Normal Symptoms:     1  Mild Headache   2  Scratchy or picky feeling around eye    Call the office if:     1  You have any questions or concerns   2  If eye pain is not relieved by extra strength tylenol    Office phone number:  [phone]      Next appointment:     1  See Dr Enma Lindsay at his office tomorrow as scheduled   __________8:45am___________   2  Bring blue eye kit with you and eyedrops to the office    A new set of comprehensive instructions will be given and reviewed with you during your office visit tomorrow

## 2018-11-26 NOTE — ANESTHESIA POSTPROCEDURE EVALUATION
Post-Op Assessment Note      CV Status:  Stable    Mental Status:  Alert and awake    Hydration Status:  Euvolemic    PONV Controlled:  Controlled    Airway Patency:  Patent    Post Op Vitals Reviewed: Yes          Staff: Anesthesiologist           /70 (11/26/18 0800)    Temp     Pulse 69 (11/26/18 0800)   Resp 18 (11/26/18 0800)    SpO2 96 % (11/26/18 0800)

## 2019-09-09 ENCOUNTER — HOSPITAL ENCOUNTER (OUTPATIENT)
Dept: RADIOLOGY | Facility: CLINIC | Age: 79
Discharge: HOME | End: 2019-09-09
Attending: ORTHOPAEDIC SURGERY
Payer: MEDICARE

## 2019-09-09 ENCOUNTER — TRANSCRIBE ORDERS (OUTPATIENT)
Dept: SCHEDULING | Age: 79
End: 2019-09-09

## 2019-09-09 DIAGNOSIS — M25.552 PAIN IN LEFT HIP: Primary | ICD-10-CM

## 2019-09-09 DIAGNOSIS — M25.552 PAIN IN LEFT HIP: ICD-10-CM

## 2019-12-09 ENCOUNTER — TRANSCRIBE ORDERS (OUTPATIENT)
Dept: ADMINISTRATIVE | Facility: HOSPITAL | Age: 79
End: 2019-12-09

## 2019-12-09 ENCOUNTER — APPOINTMENT (OUTPATIENT)
Dept: LAB | Facility: CLINIC | Age: 79
End: 2019-12-09
Payer: MEDICARE

## 2019-12-09 DIAGNOSIS — E03.9 ACQUIRED HYPOTHYROIDISM: ICD-10-CM

## 2019-12-09 DIAGNOSIS — E03.9 ACQUIRED HYPOTHYROIDISM: Primary | ICD-10-CM

## 2019-12-09 LAB — TSH SERPL DL<=0.05 MIU/L-ACNC: 0.69 UIU/ML (ref 0.36–3.74)

## 2019-12-09 PROCEDURE — 84443 ASSAY THYROID STIM HORMONE: CPT

## 2019-12-09 PROCEDURE — 36415 COLL VENOUS BLD VENIPUNCTURE: CPT

## 2019-12-12 ENCOUNTER — HOSPITAL ENCOUNTER (OUTPATIENT)
Dept: RADIOLOGY | Facility: CLINIC | Age: 79
Discharge: HOME | End: 2019-12-12
Attending: PHYSICAL MEDICINE & REHABILITATION
Payer: MEDICARE

## 2019-12-12 ENCOUNTER — TRANSCRIBE ORDERS (OUTPATIENT)
Dept: SCHEDULING | Age: 79
End: 2019-12-12

## 2019-12-12 DIAGNOSIS — M79.18 MYALGIA, OTHER SITE: ICD-10-CM

## 2019-12-12 DIAGNOSIS — M79.18 MYALGIA, OTHER SITE: Primary | ICD-10-CM

## 2021-01-22 ENCOUNTER — IMMUNIZATIONS (OUTPATIENT)
Dept: FAMILY MEDICINE CLINIC | Facility: HOSPITAL | Age: 81
End: 2021-01-22

## 2021-01-22 DIAGNOSIS — Z23 ENCOUNTER FOR IMMUNIZATION: Primary | ICD-10-CM

## 2021-01-22 PROCEDURE — 0001A SARS-COV-2 / COVID-19 MRNA VACCINE (PFIZER-BIONTECH) 30 MCG: CPT

## 2021-01-22 PROCEDURE — 91300 SARS-COV-2 / COVID-19 MRNA VACCINE (PFIZER-BIONTECH) 30 MCG: CPT

## 2021-02-11 ENCOUNTER — IMMUNIZATIONS (OUTPATIENT)
Dept: FAMILY MEDICINE CLINIC | Facility: HOSPITAL | Age: 81
End: 2021-02-11

## 2021-02-11 DIAGNOSIS — Z23 ENCOUNTER FOR IMMUNIZATION: Primary | ICD-10-CM

## 2021-02-11 PROCEDURE — 91300 SARS-COV-2 / COVID-19 MRNA VACCINE (PFIZER-BIONTECH) 30 MCG: CPT

## 2021-02-11 PROCEDURE — 0002A SARS-COV-2 / COVID-19 MRNA VACCINE (PFIZER-BIONTECH) 30 MCG: CPT

## 2021-05-04 ENCOUNTER — TRANSCRIBE ORDERS (OUTPATIENT)
Dept: ADMINISTRATIVE | Facility: HOSPITAL | Age: 81
End: 2021-05-04

## 2021-05-04 ENCOUNTER — APPOINTMENT (OUTPATIENT)
Dept: LAB | Facility: CLINIC | Age: 81
End: 2021-05-04
Payer: MEDICARE

## 2021-05-04 DIAGNOSIS — E78.2 MIXED HYPERLIPIDEMIA: ICD-10-CM

## 2021-05-04 DIAGNOSIS — I10 ESSENTIAL HYPERTENSION, MALIGNANT: ICD-10-CM

## 2021-05-04 DIAGNOSIS — I10 ESSENTIAL HYPERTENSION, MALIGNANT: Primary | ICD-10-CM

## 2021-05-04 LAB
ALBUMIN SERPL BCP-MCNC: 3.8 G/DL (ref 3.5–5)
ALP SERPL-CCNC: 71 U/L (ref 46–116)
ALT SERPL W P-5'-P-CCNC: 30 U/L (ref 12–78)
ANION GAP SERPL CALCULATED.3IONS-SCNC: 3 MMOL/L (ref 4–13)
AST SERPL W P-5'-P-CCNC: 18 U/L (ref 5–45)
BASOPHILS # BLD AUTO: 0.02 THOUSANDS/ΜL (ref 0–0.1)
BASOPHILS NFR BLD AUTO: 0 % (ref 0–1)
BILIRUB SERPL-MCNC: 0.5 MG/DL (ref 0.2–1)
BUN SERPL-MCNC: 24 MG/DL (ref 5–25)
CALCIUM SERPL-MCNC: 9.4 MG/DL (ref 8.3–10.1)
CHLORIDE SERPL-SCNC: 105 MMOL/L (ref 100–108)
CHOLEST SERPL-MCNC: 178 MG/DL (ref 50–200)
CK SERPL-CCNC: 68 U/L (ref 26–192)
CO2 SERPL-SCNC: 31 MMOL/L (ref 21–32)
CREAT SERPL-MCNC: 0.65 MG/DL (ref 0.6–1.3)
EOSINOPHIL # BLD AUTO: 0.34 THOUSAND/ΜL (ref 0–0.61)
EOSINOPHIL NFR BLD AUTO: 6 % (ref 0–6)
ERYTHROCYTE [DISTWIDTH] IN BLOOD BY AUTOMATED COUNT: 13.5 % (ref 11.6–15.1)
GFR SERPL CREATININE-BSD FRML MDRD: 83 ML/MIN/1.73SQ M
GLUCOSE P FAST SERPL-MCNC: 98 MG/DL (ref 65–99)
HCT VFR BLD AUTO: 42.3 % (ref 34.8–46.1)
HDLC SERPL-MCNC: 66 MG/DL
HGB BLD-MCNC: 13.8 G/DL (ref 11.5–15.4)
IMM GRANULOCYTES # BLD AUTO: 0.04 THOUSAND/UL (ref 0–0.2)
IMM GRANULOCYTES NFR BLD AUTO: 1 % (ref 0–2)
LDLC SERPL CALC-MCNC: 97 MG/DL (ref 0–100)
LYMPHOCYTES # BLD AUTO: 2.57 THOUSANDS/ΜL (ref 0.6–4.47)
LYMPHOCYTES NFR BLD AUTO: 43 % (ref 14–44)
MAGNESIUM SERPL-MCNC: 2 MG/DL (ref 1.6–2.6)
MCH RBC QN AUTO: 27.7 PG (ref 26.8–34.3)
MCHC RBC AUTO-ENTMCNC: 32.6 G/DL (ref 31.4–37.4)
MCV RBC AUTO: 85 FL (ref 82–98)
MONOCYTES # BLD AUTO: 0.45 THOUSAND/ΜL (ref 0.17–1.22)
MONOCYTES NFR BLD AUTO: 8 % (ref 4–12)
NEUTROPHILS # BLD AUTO: 2.47 THOUSANDS/ΜL (ref 1.85–7.62)
NEUTS SEG NFR BLD AUTO: 42 % (ref 43–75)
NONHDLC SERPL-MCNC: 112 MG/DL
NRBC BLD AUTO-RTO: 0 /100 WBCS
PLATELET # BLD AUTO: 185 THOUSANDS/UL (ref 149–390)
PMV BLD AUTO: 11.5 FL (ref 8.9–12.7)
POTASSIUM SERPL-SCNC: 4.3 MMOL/L (ref 3.5–5.3)
PROT SERPL-MCNC: 7.4 G/DL (ref 6.4–8.2)
RBC # BLD AUTO: 4.99 MILLION/UL (ref 3.81–5.12)
SODIUM SERPL-SCNC: 139 MMOL/L (ref 136–145)
TRIGL SERPL-MCNC: 75 MG/DL
WBC # BLD AUTO: 5.89 THOUSAND/UL (ref 4.31–10.16)

## 2021-05-04 PROCEDURE — 85025 COMPLETE CBC W/AUTO DIFF WBC: CPT

## 2021-05-04 PROCEDURE — 80061 LIPID PANEL: CPT

## 2021-05-04 PROCEDURE — 83735 ASSAY OF MAGNESIUM: CPT

## 2021-05-04 PROCEDURE — 82550 ASSAY OF CK (CPK): CPT

## 2021-05-04 PROCEDURE — 36415 COLL VENOUS BLD VENIPUNCTURE: CPT

## 2021-05-04 PROCEDURE — 80053 COMPREHEN METABOLIC PANEL: CPT

## 2021-06-14 ENCOUNTER — OFFICE VISIT (OUTPATIENT)
Dept: GASTROENTEROLOGY | Facility: CLINIC | Age: 81
End: 2021-06-14
Payer: MEDICARE

## 2021-06-14 VITALS
HEIGHT: 57 IN | WEIGHT: 165 LBS | HEART RATE: 83 BPM | DIASTOLIC BLOOD PRESSURE: 77 MMHG | SYSTOLIC BLOOD PRESSURE: 163 MMHG | BODY MASS INDEX: 35.6 KG/M2

## 2021-06-14 DIAGNOSIS — K22.70 BARRETT'S ESOPHAGUS WITHOUT DYSPLASIA: ICD-10-CM

## 2021-06-14 DIAGNOSIS — K59.01 SLOW TRANSIT CONSTIPATION: ICD-10-CM

## 2021-06-14 DIAGNOSIS — K75.4 AUTOIMMUNE HEPATITIS (HCC): ICD-10-CM

## 2021-06-14 DIAGNOSIS — K21.00 GASTROESOPHAGEAL REFLUX DISEASE WITH ESOPHAGITIS WITHOUT HEMORRHAGE: Primary | ICD-10-CM

## 2021-06-14 DIAGNOSIS — K52.832 LYMPHOCYTIC COLITIS: ICD-10-CM

## 2021-06-14 DIAGNOSIS — E66.3 OVERWEIGHT: ICD-10-CM

## 2021-06-14 DIAGNOSIS — Z12.11 COLON CANCER SCREENING: ICD-10-CM

## 2021-06-14 PROCEDURE — 99214 OFFICE O/P EST MOD 30 MIN: CPT | Performed by: INTERNAL MEDICINE

## 2021-06-14 RX ORDER — FAMOTIDINE 40 MG/1
40 TABLET, FILM COATED ORAL DAILY PRN
COMMUNITY
End: 2021-07-12 | Stop reason: SDUPTHER

## 2021-06-14 NOTE — PROGRESS NOTES
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   recent liver function tests normal    4  Lymphocytic colitis   asymptomatic at present    5  Colon cancer screening   up-to-date next colonoscopy October 2025 no history of adenomas    6  Slow transit constipation   controlled on MiraLax every other day    7  Overweight   work on portion control and exercise  She will otherwise follow-up in the year    ______________________________________________________________________    SUBJECTIVE:   Very pleasant 79-year-old lady well known to our practice  She has multiple gastrointestinal diagnoses  We went through those 1 x 1  She is actually she a relatively stable with regards to her gastrointestinal diagnoses  Recent LFTs are normal with her underlying autoimmune hepatitis  Biggest issue is weight gain since the [facility]  REVIEW OF SYSTEMS IS OTHERWISE NEGATIVE        Historical Information   Past Medical History:   Diagnosis Date    Arthritis     osteo    Autoimmune hepatitis ([facility] [address] )     Back pain with radiation     to bilat legs    De Santiago's esophagus     Chronic GERD     Chronic uveitis     bilateral    CPAP (continuous positive airway pressure) dependence     Diabetes mellitus (HCC)     diet controlled    Disease of thyroid gland     hypo    Diverticulitis     Fibromyalgia syndrome     Fibromyalgia, primary     Hiatal hernia     Hx of colonoscopy 2016    EGD done simultaneously    Hyperlipidemia     Hypertension  Infectious viral hepatitis     PONV (postoperative nausea and vomiting)     Sinus congestion     Sleep apnea     CPAP rx, does not use mask     Past Surgical History:   Procedure Laterality Date    BILATERAL SALPINGOOPHORECTOMY  1996    BREAST BIOPSY Right     CARPAL TUNNEL RELEASE Right 2004    FRACTURE SURGERY Right     ORIF femur, emily in place    HYSTERECTOMY  1974    GOPAL    JOINT REPLACEMENT Right 2004    knee    JOINT REPLACEMENT Left 2007    knee    JOINT REPLACEMENT Right 2011    hip    JOINT REPLACEMENT Left 2017    knee    IL XCAPSL CTRC RMVL INSJ IO LENS PROSTH W/O ECP Right 8/22/2016    Procedure: EXTRACTION EXTRACAPSULAR CATARACT PHACO INTRAOCULAR LENS (IOL); Surgeon: Arielle Banegas MD;  Location: [facility] MAIN OR;  Service: Ophthalmology    IL XCAPSL CTRC RMVL INSJ IO LENS PROSTH W/O ECP Left 11/26/2018    Procedure: EXTRACTION EXTRACAPSULAR CATARACT PHACO INTRAOCULAR LENS (IOL);   Surgeon: Arielle Banegas MD;  Location: [facility] MAIN OR;  Service: Ophthalmology     Social History   Social History     Substance and Sexual Activity   Alcohol Use No     Social History     Substance and Sexual Activity   Drug Use No     Social History     Tobacco Use   Smoking Status Never Smoker   Smokeless Tobacco Never Used     Family History   Problem Relation Age of Onset    Heart disease Mother     Diverticulitis Mother     Stroke Father     COPD Sister     Diabetes Brother     Cancer Maternal Grandmother         breast       Meds/Allergies       Current Outpatient Medications:     acetaminophen (TYLENOL) 325 mg tablet    amLODIPine (NORVASC) 5 mg tablet    atorvastatin (LIPITOR) 10 mg tablet    Calcium Carbonate (CALTRATE 600 PO)    carvedilol (COREG CR) 40 MG 24 hr capsule    carvedilol (COREG) 3 125 mg tablet    Cholecalciferol (VITAMIN D-3 PO)    famotidine (PEPCID) 40 MG tablet    gatifloxacin (ZYMAXID) 0 5 %    levothyroxine (SYNTHROID) 88 mcg tablet    Multiple Vitamins-Minerals (PRESERVISION AREDS PO)    polyethylene glycol (MIRALAX) 17 g packet    prednisoLONE acetate (PRED FORTE) 1 % ophthalmic suspension    quinapril (ACCUPRIL) 40 MG tablet    Allergies   Allergen Reactions    Biaxin [Clarithromycin] GI Intolerance    Codeine Other (See Comments)     dizzy    Latex Hives     Blisters and hives local reaction    Levaquin [Levofloxacin In D5w] GI Intolerance    Morphine And Related GI Intolerance    Nsaids GI Intolerance    Oxycodone Other (See Comments)     dizzy    Percocet [Oxycodone-Acetaminophen] Other (See Comments)     dizzy    Ultram [Tramadol Hcl] Other (See Comments)     dizzy           Objective     Blood pressure 163/77, pulse 83, height 4' 9" (1 448 m), weight 74 8 kg (165 lb)  Body mass index is 35 71 kg/m²  PHYSICAL EXAM:      General Appearance:   Alert, cooperative, no distress   HEENT:   Normocephalic, atraumatic, anicteric      Neck:  Supple, symmetrical, trachea midline   Lungs:   Clear to auscultation bilaterally; no rales, rhonchi or wheezing; respirations unlabored    Heart[de-identified]   Regular rate and rhythm; no murmur, rub, or gallop  Abdomen:   Soft, non-tender, non-distended; normal bowel sounds; no masses, no organomegaly    Genitalia:   Deferred    Rectal:   Deferred    Extremities:  No cyanosis, clubbing or edema    Pulses:  2+ and symmetric    Skin:  No jaundice, rashes, or lesions    Lymph nodes:  No palpable cervical lymphadenopathy        Lab Results:   No visits with results within 1 Day(s) from this visit     Latest known visit with results is:   Appointment on 05/04/2021   Component Date Value    Cholesterol 05/04/2021 178     Triglycerides 05/04/2021 75     HDL, Direct 05/04/2021 66     LDL Calculated 05/04/2021 97     Non-HDL-Chol (CHOL-HDL) 05/04/2021 112     Total CK 05/04/2021 68     WBC 05/04/2021 5 89     RBC 05/04/2021 4 99     Hemoglobin 05/04/2021 13 8     Hematocrit 05/04/2021 42 3     MCV 05/04/2021 85     MCH 05/04/2021 27 7     MCHC 05/04/2021 32 6     RDW 05/04/2021 13 5     MPV 05/04/2021 11 5     Platelets 28/39/5166 185     nRBC 05/04/2021 0     Neutrophils Relative 05/04/2021 42*    Immat GRANS % 05/04/2021 1     Lymphocytes Relative 05/04/2021 43     Monocytes Relative 05/04/2021 8     Eosinophils Relative 05/04/2021 6     Basophils Relative 05/04/2021 0     Neutrophils Absolute 05/04/2021 2 47     Immature Grans Absolute 05/04/2021 0 04     Lymphocytes Absolute 05/04/2021 2 57     Monocytes Absolute 05/04/2021 0 45     Eosinophils Absolute 05/04/2021 0 34     Basophils Absolute 05/04/2021 0 02     Sodium 05/04/2021 139     Potassium 05/04/2021 4 3     Chloride 05/04/2021 105     CO2 05/04/2021 31     ANION GAP 05/04/2021 3*    BUN 05/04/2021 24     Creatinine 05/04/2021 0 65     Glucose, Fasting 05/04/2021 98     Calcium 05/04/2021 9 4     AST 05/04/2021 18     ALT 05/04/2021 30     Alkaline Phosphatase 05/04/2021 71     Total Protein 05/04/2021 7 4     Albumin 05/04/2021 3 8     Total Bilirubin 05/04/2021 0 50     eGFR 05/04/2021 83     Magnesium 05/04/2021 2 0          Radiology Results:   No results found

## 2021-07-10 DIAGNOSIS — K21.00 GASTROESOPHAGEAL REFLUX DISEASE WITH ESOPHAGITIS WITHOUT HEMORRHAGE: Primary | ICD-10-CM

## 2021-07-12 RX ORDER — FAMOTIDINE 20 MG/1
TABLET, FILM COATED ORAL
Qty: 180 TABLET | Refills: 2 | Status: SHIPPED | OUTPATIENT
Start: 2021-07-12 | End: 2021-10-19

## 2021-10-09 DIAGNOSIS — K21.00 GASTROESOPHAGEAL REFLUX DISEASE WITH ESOPHAGITIS WITHOUT HEMORRHAGE: ICD-10-CM

## 2021-10-19 RX ORDER — FAMOTIDINE 20 MG/1
TABLET, FILM COATED ORAL
Qty: 180 TABLET | Refills: 2 | Status: SHIPPED | OUTPATIENT
Start: 2021-10-19 | End: 2022-05-17

## 2022-04-19 ENCOUNTER — HOSPITAL ENCOUNTER (OUTPATIENT)
Dept: RADIOLOGY | Age: 82
Discharge: HOME | End: 2022-04-19
Attending: PHYSICAL MEDICINE & REHABILITATION
Payer: MEDICARE

## 2022-04-19 ENCOUNTER — TRANSCRIBE ORDERS (OUTPATIENT)
Dept: SCHEDULING | Age: 82
End: 2022-04-19

## 2022-04-19 DIAGNOSIS — M54.12 RADICULOPATHY, CERVICAL REGION: ICD-10-CM

## 2022-04-19 DIAGNOSIS — M54.12 RADICULOPATHY, CERVICAL REGION: Primary | ICD-10-CM

## 2022-05-17 DIAGNOSIS — K21.00 GASTROESOPHAGEAL REFLUX DISEASE WITH ESOPHAGITIS WITHOUT HEMORRHAGE: ICD-10-CM

## 2022-05-17 RX ORDER — FAMOTIDINE 20 MG/1
TABLET, FILM COATED ORAL
Qty: 180 TABLET | Refills: 2 | Status: SHIPPED | OUTPATIENT
Start: 2022-05-17

## 2022-06-02 ENCOUNTER — HOSPITAL ENCOUNTER (OUTPATIENT)
Dept: RADIOLOGY | Facility: HOSPITAL | Age: 82
Discharge: HOME | End: 2022-06-02
Payer: MEDICARE

## 2022-06-02 DIAGNOSIS — K11.8 PAROTID MASS: ICD-10-CM

## 2022-06-02 PROCEDURE — G1004 CDSM NDSC: HCPCS

## 2022-06-02 PROCEDURE — 70491 CT SOFT TISSUE NECK W/DYE: CPT

## 2022-06-02 RX ADMIN — IOHEXOL 65 ML: 350 INJECTION, SOLUTION INTRAVENOUS at 11:21

## 2022-06-07 ENCOUNTER — APPOINTMENT (OUTPATIENT)
Dept: LAB | Facility: CLINIC | Age: 82
End: 2022-06-07
Payer: MEDICARE

## 2022-06-07 DIAGNOSIS — E78.00 PURE HYPERCHOLESTEROLEMIA: ICD-10-CM

## 2022-06-07 LAB
CHOLEST SERPL-MCNC: 170 MG/DL
CK SERPL-CCNC: 72 U/L (ref 26–192)
HDLC SERPL-MCNC: 63 MG/DL
LDLC SERPL CALC-MCNC: 94 MG/DL (ref 0–100)
NONHDLC SERPL-MCNC: 107 MG/DL
TRIGL SERPL-MCNC: 64 MG/DL

## 2022-06-07 PROCEDURE — 80061 LIPID PANEL: CPT

## 2022-06-07 PROCEDURE — 36415 COLL VENOUS BLD VENIPUNCTURE: CPT

## 2022-06-07 PROCEDURE — 82550 ASSAY OF CK (CPK): CPT

## 2022-07-26 ENCOUNTER — OFFICE VISIT (OUTPATIENT)
Dept: GASTROENTEROLOGY | Facility: CLINIC | Age: 82
End: 2022-07-26
Payer: MEDICARE

## 2022-07-26 VITALS
HEART RATE: 72 BPM | HEIGHT: 58 IN | DIASTOLIC BLOOD PRESSURE: 91 MMHG | BODY MASS INDEX: 31.23 KG/M2 | SYSTOLIC BLOOD PRESSURE: 139 MMHG | WEIGHT: 148.8 LBS

## 2022-07-26 DIAGNOSIS — Z12.11 COLON CANCER SCREENING: ICD-10-CM

## 2022-07-26 DIAGNOSIS — K21.00 GASTROESOPHAGEAL REFLUX DISEASE WITH ESOPHAGITIS WITHOUT HEMORRHAGE: Primary | ICD-10-CM

## 2022-07-26 DIAGNOSIS — K75.4 AUTOIMMUNE HEPATITIS (HCC): ICD-10-CM

## 2022-07-26 DIAGNOSIS — K22.70 BARRETT'S ESOPHAGUS WITHOUT DYSPLASIA: ICD-10-CM

## 2022-07-26 DIAGNOSIS — E66.3 OVERWEIGHT: ICD-10-CM

## 2022-07-26 DIAGNOSIS — K59.01 SLOW TRANSIT CONSTIPATION: ICD-10-CM

## 2022-07-26 DIAGNOSIS — K52.832 LYMPHOCYTIC COLITIS: ICD-10-CM

## 2022-07-26 PROCEDURE — 99214 OFFICE O/P EST MOD 30 MIN: CPT | Performed by: INTERNAL MEDICINE

## 2022-07-26 RX ORDER — DARIFENACIN HYDROBROMIDE 7.5 MG/1
7.5 TABLET, EXTENDED RELEASE ORAL DAILY
COMMUNITY
Start: 2022-07-07 | End: 2022-08-06

## 2022-07-26 RX ORDER — GABAPENTIN 100 MG/1
100 CAPSULE ORAL DAILY
COMMUNITY
End: 2022-09-16 | Stop reason: ALTCHOICE

## 2022-07-26 RX ORDER — LEVOTHYROXINE SODIUM 0.07 MG/1
TABLET ORAL
COMMUNITY
Start: 2022-05-16

## 2022-07-26 NOTE — PROGRESS NOTES
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  Surveillance up-to-date    3  Lymphocytic colitis  Asymptomatic    4  Autoimmune hepatitis ([facility] [address] )  Asymptomatic LFTs in May were normal    5  Slow transit constipation  Controlled with MiraLax every other day    6  Colon cancer screening  Due in October of 2025    7  Overweight  Has gained some weight is going to work on portion control and exercise  Follow-up 9 months     ______________________________________________________________________    SUBJECTIVE:  Very pleasant 79-year-old lady presents for follow-up we seen her for reflux, Barretts esophagus, lymphocytic colitis, autoimmune hepatitis, colon cancer screening, weight, constipation  Currently her biggest issue is occasional reflux she has not been taking her Pepcid on a regular basis  She will now take it twice a day  She will work on conservative measures and weight loss  Colon cancer screening is up-to-date  Barretts surveillance is up-to-date  REVIEW OF SYSTEMS IS OTHERWISE NEGATIVE        Historical Information   Past Medical History:   Diagnosis Date    Arthritis     osteo    Autoimmune hepatitis ([facility] [address] )     Back pain with radiation     to bilat legs    De Santiago's esophagus     Chronic GERD     Chronic uveitis     bilateral    CPAP (continuous positive airway pressure) dependence     Diabetes mellitus (HCC)     diet controlled    Disease of thyroid gland     hypo    Diverticulitis     Fibromyalgia syndrome     Fibromyalgia, primary     Hiatal hernia     Hx of colonoscopy 2016    EGD done simultaneously    Hyperlipidemia     Hypertension     Infectious viral hepatitis     PONV (postoperative nausea and vomiting)     Sinus congestion     Sleep apnea     CPAP rx, does not use mask     Past Surgical History:   Procedure Laterality Date    BILATERAL SALPINGOOPHORECTOMY  1996    BREAST BIOPSY Right     CARPAL TUNNEL RELEASE Right 2004    FRACTURE SURGERY Right     ORIF femur, emily in place    HYSTERECTOMY  1974    GOPAL    JOINT REPLACEMENT Right 2004    knee    JOINT REPLACEMENT Left 2007    knee    JOINT REPLACEMENT Right 2011    hip    JOINT REPLACEMENT Left 2017    knee    [place] XCAPSL CTRC RMVL INSJ IO LENS PROSTH W/O ECP Right 8/22/2016    Procedure: EXTRACTION EXTRACAPSULAR CATARACT PHACO INTRAOCULAR LENS (IOL); Surgeon: Norberto Jones MD;  Location: [facility] MAIN OR;  Service: Ophthalmology    IA XCAPSL CTRC RMVL INSJ IO LENS PROSTH W/O ECP Left 11/26/2018    Procedure: EXTRACTION EXTRACAPSULAR CATARACT PHACO INTRAOCULAR LENS (IOL);   Surgeon: Norberto Jones MD;  Location: [facility] MAIN OR;  Service: Ophthalmology     Social History   Social History     Substance and Sexual Activity   Alcohol Use No     Social History     Substance and Sexual Activity   Drug Use No     Social History     Tobacco Use   Smoking Status Never Smoker   Smokeless Tobacco Never Used     Family History   Problem Relation Age of Onset    Heart disease Mother     Diverticulitis Mother     Stroke Father     COPD Sister     Diabetes Brother     Cancer Maternal Grandmother         breast       Meds/Allergies       Current Outpatient Medications:     amLODIPine (NORVASC) 5 mg tablet    atorvastatin (LIPITOR) 10 mg tablet    Calcium Carbonate (CALTRATE 600 PO)    carvedilol (COREG) 3 125 mg tablet    Cholecalciferol (VITAMIN D-3 PO)    famotidine (PEPCID) 20 mg tablet    gabapentin (NEURONTIN) 100 mg capsule    levothyroxine 75 mcg tablet    polyethylene glycol (MIRALAX) 17 g packet    quinapril (ACCUPRIL) 40 MG tablet   acetaminophen (TYLENOL) 325 mg tablet    carvedilol (COREG CR) 40 MG 24 hr capsule    darifenacin (ENABLEX) 7 5 MG 24 hr tablet    gatifloxacin (ZYMAXID) 0 5 %    levothyroxine 88 mcg tablet    Multiple Vitamins-Minerals (PRESERVISION AREDS PO)    prednisoLONE acetate (PRED FORTE) 1 % ophthalmic suspension    Allergies   Allergen Reactions    Biaxin [Clarithromycin] GI Intolerance    Codeine Other (See Comments)     dizzy    Latex Hives     Blisters and hives local reaction    Levaquin [Levofloxacin In D5w] GI Intolerance    Morphine And Related GI Intolerance    Nsaids GI Intolerance    Oxycodone Other (See Comments)     dizzy    Percocet [Oxycodone-Acetaminophen] Other (See Comments)     dizzy    Ultram [Tramadol Hcl] Other (See Comments)     dizzy           Objective     Blood pressure 139/91, pulse 72, height 4' 10" (1 473 m), weight 67 5 kg (148 lb 12 8 oz)  Body mass index is 31 1 kg/m²  PHYSICAL EXAM:      General Appearance:   Alert, cooperative, no distress   HEENT:   Normocephalic, atraumatic, anicteric      Neck:  Supple, symmetrical, trachea midline   Lungs:   Clear to auscultation bilaterally; no rales, rhonchi or wheezing; respirations unlabored    Heart[de-identified]   Regular rate and rhythm; no murmur, rub, or gallop  Abdomen:   Soft, non-tender, non-distended; normal bowel sounds; no masses, no organomegaly    Genitalia:   Deferred    Rectal:   Deferred    Extremities:  No cyanosis, clubbing or edema    Pulses:  2+ and symmetric    Skin:  No jaundice, rashes, or lesions    Lymph nodes:  No palpable cervical lymphadenopathy        Lab Results:   No visits with results within 1 Day(s) from this visit     Latest known visit with results is:   Appointment on 06/07/2022   Component Date Value    Cholesterol 06/07/2022 170     Triglycerides 06/07/2022 64     HDL, Direct 06/07/2022 63     LDL Calculated 06/07/2022 94     Non-HDL-Chol (CHOL-HDL) 06/07/2022 107     Total CK 06/07/2022 72 Radiology Results:   No results found

## 2022-09-06 ENCOUNTER — RA CDI HCC (OUTPATIENT)
Dept: OTHER | Facility: HOSPITAL | Age: 82
End: 2022-09-06

## 2022-09-06 NOTE — PROGRESS NOTES
[facility] [address]  coding opportunities       Chart reviewed, no opportunity found: CHART REVIEWED, NO OPPORTUNITY FOUND        Patients Insurance     Medicare Insurance: Medicare

## 2022-09-16 ENCOUNTER — OFFICE VISIT (OUTPATIENT)
Dept: FAMILY MEDICINE CLINIC | Facility: CLINIC | Age: 82
End: 2022-09-16
Payer: MEDICARE

## 2022-09-16 VITALS
BODY MASS INDEX: 33.74 KG/M2 | WEIGHT: 150 LBS | RESPIRATION RATE: 18 BRPM | OXYGEN SATURATION: 97 % | DIASTOLIC BLOOD PRESSURE: 62 MMHG | HEIGHT: 56 IN | HEART RATE: 69 BPM | SYSTOLIC BLOOD PRESSURE: 122 MMHG | TEMPERATURE: 97.6 F

## 2022-09-16 DIAGNOSIS — I10 ESSENTIAL HYPERTENSION: ICD-10-CM

## 2022-09-16 DIAGNOSIS — Z23 NEED FOR VACCINATION: ICD-10-CM

## 2022-09-16 DIAGNOSIS — N39.46 MIXED STRESS AND URGE URINARY INCONTINENCE: ICD-10-CM

## 2022-09-16 DIAGNOSIS — Z11.59 NEED FOR HEPATITIS C SCREENING TEST: ICD-10-CM

## 2022-09-16 DIAGNOSIS — E03.9 ACQUIRED HYPOTHYROIDISM: ICD-10-CM

## 2022-09-16 DIAGNOSIS — E78.2 MIXED HYPERLIPIDEMIA: ICD-10-CM

## 2022-09-16 DIAGNOSIS — Z00.00 MEDICARE ANNUAL WELLNESS VISIT, SUBSEQUENT: Primary | ICD-10-CM

## 2022-09-16 PROBLEM — K44.9 HIATAL HERNIA: Status: ACTIVE | Noted: 2022-09-16

## 2022-09-16 PROCEDURE — G0009 ADMIN PNEUMOCOCCAL VACCINE: HCPCS

## 2022-09-16 PROCEDURE — 99204 OFFICE O/P NEW MOD 45 MIN: CPT | Performed by: FAMILY MEDICINE

## 2022-09-16 PROCEDURE — G0008 ADMIN INFLUENZA VIRUS VAC: HCPCS

## 2022-09-16 PROCEDURE — 90677 PCV20 VACCINE IM: CPT

## 2022-09-16 PROCEDURE — 90662 IIV NO PRSV INCREASED AG IM: CPT

## 2022-09-16 RX ORDER — CARVEDILOL 12.5 MG/1
12.5 TABLET ORAL 2 TIMES DAILY WITH MEALS
COMMUNITY

## 2022-09-16 RX ORDER — GABAPENTIN 300 MG/1
CAPSULE ORAL
COMMUNITY
Start: 2022-08-19

## 2022-09-16 NOTE — PROGRESS NOTES
Assessment and Plan:     Problem List Items Addressed This Visit     Acquired hypothyroidism     Stable  Continue levothyroxine 75 mcg daily         Relevant Medications    carvedilol (COREG) 12 5 mg tablet    Other Relevant Orders    TSH, 3rd generation    T4, free    Essential hypertension     Blood pressure is well controlled   Patient is taking carvedilol 12 5 mg and 3 125 mg twice daily along with quinapril 40 mg daily and amlodipine 5 mg daily         Relevant Medications    carvedilol (COREG) 12 5 mg tablet    Other Relevant Orders    CBC    Comprehensive metabolic panel    Lipid Panel with Direct LDL reflex    Mixed hyperlipidemia     Stable  Continue atorvastatin 10 mg daily         Relevant Orders    Lipid Panel with Direct LDL reflex    Mixed stress and urge urinary incontinence     Progressively worsening urinary incontinence  Patient referred to physical therapy for pelvic floor therapy and is going to follow-up with urology as well         Relevant Orders    Ambulatory referral to Physical Therapy      Other Visit Diagnoses     Medicare annual wellness visit, subsequent    -  Primary    Need for vaccination        Relevant Orders    influenza vaccine, high-dose, PF 0 7 mL (FLUZONE HIGH-DOSE) (Completed)    Pneumococcal Conjugate Vaccine 20-valent (Pcv20) (Completed)    Need for hepatitis C screening test        Relevant Orders    Hepatitis C antibody        BMI Counseling: Body mass index is 33 63 kg/m²  The BMI is above normal  Exercise recommendations include exercising 3-5 times per week  Rationale for BMI follow-up plan is due to patient being overweight or obese  Depression Screening and Follow-up Plan: Patient was screened for depression during today's encounter  They screened negative with a PHQ-2 score of 0  Declined DEXA scan  Preventive health issues were discussed with patient, and age appropriate screening tests were ordered as noted in patient's After Visit Summary    Personalized health advice and appropriate referrals for health education or preventive services given if needed, as noted in patient's After Visit Summary  History of Present Illness:     Patient presents for a Medicare Wellness Visit    She is here to establish care  Patient reports a history of multiple orthopedic issues  She has been having issues with her urinary incontinence  Her daughters have set up an appointment with a urologist   She has issues with stress as well as urge incontinence  She has to wear a pad at all times  She has been following with cardiology regularly  They have adjusted her carvedilol  Patient Care Team:  Natan Merino DO as PCP - General (Family Medicine)  MD Teri Brink MD     Review of Systems:     Review of Systems   Constitutional: Negative  Respiratory: Negative  Cardiovascular: Negative  Musculoskeletal: Positive for arthralgias          Problem List:     Patient Active Problem List   Diagnosis    Gastroesophageal reflux disease with esophagitis    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    Autoimmune hepatitis ([address] )    Lymphocytic colitis    Colon cancer screening    Overweight    Slow transit constipation    Acquired hypothyroidism    Essential hypertension    Mixed hyperlipidemia    Hiatal hernia    Mixed stress and urge urinary incontinence      Past Medical and Surgical History:     Past Medical History:   Diagnosis Date    Arthritis     osteo    Autoimmune hepatitis ([address] )     Back pain with radiation     to bilat legs    De Santiago's esophagus     Chronic GERD     Chronic uveitis     bilateral    CPAP (continuous positive airway pressure) dependence     Diabetes mellitus ([address] )     diet controlled    Disease of thyroid gland     hypo    Diverticulitis     Fibromyalgia syndrome     Fibromyalgia, primary     Hiatal hernia     Hx of colonoscopy 2016    EGD done simultaneously    Hyperlipidemia     Hypertension     Infectious viral hepatitis     PONV (postoperative nausea and vomiting)     Sinus congestion     Sleep apnea     CPAP rx, does not use mask     Past Surgical History:   Procedure Laterality Date    BILATERAL SALPINGOOPHORECTOMY  1996    BREAST BIOPSY Right     CARPAL TUNNEL RELEASE Right 2004    FRACTURE SURGERY Right     ORIF femur, emily in place    HYSTERECTOMY  1974    GOPAL    JOINT REPLACEMENT Right 2004    knee    JOINT REPLACEMENT Left 2007    knee    JOINT REPLACEMENT Right 2011    hip    JOINT REPLACEMENT Left 2017    knee    IA XCAPSL CTRC RMVL INSJ IO LENS PROSTH W/O ECP Right 8/22/2016    Procedure: EXTRACTION EXTRACAPSULAR CATARACT PHACO INTRAOCULAR LENS (IOL); Surgeon: Stephenie Scott MD;  Location: [facility] MAIN OR;  Service: Ophthalmology    IA XCAPSL CTRC RMVL INSJ IO LENS PROSTH W/O ECP Left 11/26/2018    Procedure: EXTRACTION EXTRACAPSULAR CATARACT PHACO INTRAOCULAR LENS (IOL);   Surgeon: Stephenie Scott MD;  Location: [facility] MAIN OR;  Service: Ophthalmology      Family History:     Family History   Problem Relation Age of Onset    Heart disease Mother     Diverticulitis Mother     Stroke Father     COPD Sister     Diabetes Brother     Cancer Maternal Grandmother         breast      Social History:     Social History     Socioeconomic History    Marital status: /Civil Union     Spouse name: None    Number of children: None    Years of education: None    Highest education level: None   Occupational History    None   Tobacco Use    Smoking status: Never Smoker    Smokeless tobacco: Never Used   Vaping Use    Vaping Use: Never used   Substance and Sexual Activity    Alcohol use: No    Drug use: No    Sexual activity: None   Other Topics Concern    None   Social History Narrative    None     Social Determinants of Health     Financial Resource Strain: Low Risk     Difficulty of Paying Living Expenses: Not hard at all   Food Insecurity: Not on file Transportation Needs: No Transportation Needs    Lack of Transportation (Medical): No    Lack of Transportation (Non-Medical): No   Physical Activity: Not on file   Stress: Not on file   Social Connections: Not on file   Intimate Partner Violence: Not on file   Housing Stability: Not on file      Medications and Allergies:     Current Outpatient Medications   Medication Sig Dispense Refill    amLODIPine (NORVASC) 5 mg tablet Take 5 mg by mouth every morning   atorvastatin (LIPITOR) 10 mg tablet Take 10 mg by mouth daily      carvedilol (COREG) 12 5 mg tablet Take 12 5 mg by mouth 2 (two) times a day with meals      carvedilol (COREG) 3 125 mg tablet Take 3 125 mg by mouth 2 (two) times a day with meals      famotidine (PEPCID) 20 mg tablet TAKE ONE TABLET BY MOUTH TWICE A  tablet 2    gabapentin (NEURONTIN) 300 mg capsule       levothyroxine 75 mcg tablet       polyethylene glycol (MIRALAX) 17 g packet Take 17 g by mouth every morning   quinapril (ACCUPRIL) 40 MG tablet Take 40 mg by mouth every morning   acetaminophen (TYLENOL) 325 mg tablet Take 650 mg by mouth every 6 (six) hours as needed for mild pain   Calcium Carbonate (CALTRATE 600 PO) Take 1 tablet by mouth every evening  (Patient not taking: Reported on 9/16/2022)      Cholecalciferol (VITAMIN D-3 PO) Take 1 tablet by mouth every morning  (Patient not taking: Reported on 9/16/2022)      darifenacin (ENABLEX) 7 5 MG 24 hr tablet Take 7 5 mg by mouth daily (Patient not taking: Reported on 7/26/2022)       No current facility-administered medications for this visit       Allergies   Allergen Reactions    Biaxin [Clarithromycin] GI Intolerance    Codeine Other (See Comments)     dizzy    Latex Hives     Blisters and hives local reaction    Levaquin [Levofloxacin In D5w] GI Intolerance    Morphine And Related GI Intolerance    Nsaids GI Intolerance    Oxycodone Other (See Comments)     dizzy    Percocet [Oxycodone-Acetaminophen] Other (See Comments)     dizzy    Ultram [Tramadol Hcl] Other (See Comments)     dizzy      Immunizations:     Immunization History   Administered Date(s) Administered    COVID-19 PFIZER VACCINE 0 3 ML IM 01/22/2021, 02/11/2021    Influenza Split High Dose Preservative Free IM 08/30/2017, 09/24/2018    Influenza, Seasonal Vaccine, Quadrivalent, Adjuvanted,  5e 09/28/2020    Influenza, high dose seasonal 0 7 mL 09/29/2021, 09/16/2022    Influenza, seasonal, injectable 10/01/2016    Pneumococcal Conjugate 13-Valent 03/18/2017    Pneumococcal Conjugate Vaccine 20-valent (Pcv20), Polysace 09/16/2022    influenza, trivalent, adjuvanted 09/27/2019      Health Maintenance: There are no preventive care reminders to display for this patient  Topic Date Due    COVID-19 Vaccine (3 - Booster for Pfizer series) 07/11/2021      Medicare Screening Tests and Risk Assessments: Greg Buckley is here for her Subsequent Wellness visit  Health Risk Assessment:   Patient rates overall health as good  Patient feels that their physical health rating is slightly worse  Patient is satisfied with their life  Eyesight was rated as same  Hearing was rated as same  Patient feels that their emotional and mental health rating is same  Patients states they are sometimes angry  Patient states they are often unusually tired/fatigued  Pain experienced in the last 7 days has been some  Patient's pain rating has been 7/10  Patient states that she has experienced weight loss or gain in last 6 months  Depression Screening:   PHQ-2 Score: 0      Fall Risk Screening: In the past year, patient has experienced: no history of falling in past year      Urinary Incontinence Screening:   Patient has not leaked urine accidently in the last six months  Home Safety:  Patient has trouble with stairs inside or outside of their home  Patient has working smoke alarms and has working carbon monoxide detector   Home safety hazards include: none  Nutrition:   Current diet is Regular  Medications:   Patient is not currently taking any over-the-counter supplements  Patient is able to manage medications  Activities of Daily Living (ADLs)/Instrumental Activities of Daily Living (IADLs):   Walk and transfer into and out of bed and chair?: Yes  Dress and groom yourself?: Yes    Bathe or shower yourself?: Yes    Feed yourself? Yes  Do your laundry/housekeeping?: Yes  Manage your money, pay your bills and track your expenses?: Yes  Make your own meals?: Yes    Do your own shopping?: Yes    Previous Hospitalizations:   Any hospitalizations or ED visits within the last 12 months?: No      Advance Care Planning:   Living will: Yes    Durable POA for healthcare: Yes    Advanced directive: Yes    Advanced directive counseling given: Yes    End of Life Decisions reviewed with patient: Yes    Provider agrees with end of life decisions: Yes      Cognitive Screening:   Provider or family/friend/caregiver concerned regarding cognition?: No    PREVENTIVE SCREENINGS      Cardiovascular Screening:    General: Screening Not Indicated and History Lipid Disorder      Diabetes Screening:     General: Risks and Benefits Discussed    Due for: Blood Glucose      Colorectal Cancer Screening:     General: Screening Not Indicated      Breast Cancer Screening:     General: Screening Not Indicated      Cervical Cancer Screening:    General: Screening Not Indicated      Abdominal Aortic Aneurysm (AAA) Screening:        General: Screening Not Indicated      Lung Cancer Screening:     General: Screening Not Indicated      Hepatitis C Screening:    General: Risks and Benefits Discussed    Hep C Screening Accepted: Yes      Screening, Brief Intervention, and Referral to Treatment (SBIRT)    Screening  Typical number of drinks in a day: 0  Typical number of drinks in a week: 0  Interpretation: Low risk drinking behavior      AUDIT-C Screenin) How often did you have a drink containing alcohol in the past year? never  2) How many drinks did you have on a typical day when you were drinking in the past year? 0  3) How often did you have 6 or more drinks on one occasion in the past year? never    AUDIT-C Score: 0  Interpretation: Score 0-2 (female): Negative screen for alcohol misuse    Single Item Drug Screening:  How often have you used an illegal drug (including marijuana) or a prescription medication for non-medical reasons in the past year? never    Single Item Drug Screen Score: 0  Interpretation: Negative screen for possible drug use disorder    No exam data present     Physical Exam:     /62   Pulse 69   Temp 97 6 °F (36 4 °C)   Resp 18   Ht 4' 8" (1 422 m)   Wt 68 kg (150 lb)   SpO2 97%   BMI 33 63 kg/m²     Physical Exam  Vitals and nursing note reviewed  Constitutional:       Appearance: She is well-developed  HENT:      Head: Normocephalic and atraumatic  Right Ear: External ear normal       Left Ear: External ear normal       Nose: Nose normal    Cardiovascular:      Rate and Rhythm: Normal rate and regular rhythm  Heart sounds: Normal heart sounds  No murmur heard  No friction rub  Pulmonary:      Effort: No respiratory distress  Breath sounds: Normal breath sounds  No wheezing or rales  Musculoskeletal:      Right lower leg: No edema  Left lower leg: No edema  Neurological:      Mental Status: She is oriented to person, place, and time  Cranial Nerves: No cranial nerve deficit            Yi Men, DO

## 2022-09-16 NOTE — ASSESSMENT & PLAN NOTE
Progressively worsening urinary incontinence    Patient referred to physical therapy for pelvic floor therapy and is going to follow-up with urology as well

## 2022-09-16 NOTE — ASSESSMENT & PLAN NOTE
Blood pressure is well controlled   Patient is taking carvedilol 12 5 mg and 3 125 mg twice daily along with quinapril 40 mg daily and amlodipine 5 mg daily

## 2022-09-16 NOTE — PATIENT INSTRUCTIONS
Medicare Preventive Visit Patient Instructions  Thank you for completing your Welcome to Medicare Visit or Medicare Annual Wellness Visit today  Your next wellness visit will be due in one year (9/17/2023)  The screening/preventive services that you may require over the next 5-10 years are detailed below  Some tests may not apply to you based off risk factors and/or age  Screening tests ordered at today's visit but not completed yet may show as past due  Also, please note that scanned in results may not display below  Preventive Screenings:  Service Recommendations Previous Testing/Comments   Colorectal Cancer Screening  * Colonoscopy    * Fecal Occult Blood Test (FOBT)/Fecal Immunochemical Test (FIT)  * Fecal DNA/Cologuard Test  * Flexible Sigmoidoscopy Age: 39-70 years old   Colonoscopy: every 10 years (may be performed more frequently if at higher risk)  OR  FOBT/FIT: every 1 year  OR  Cologuard: every 3 years  OR  Sigmoidoscopy: every 5 years  Screening may be recommended earlier than age 39 if at higher risk for colorectal cancer  Also, an individualized decision between you and your healthcare provider will decide whether screening between the ages of 74-80 would be appropriate  Colonoscopy: 10/27/2015  FOBT/FIT: Not on file  Cologuard: Not on file  Sigmoidoscopy: Not on file          Breast Cancer Screening Age: 36 years old  Frequency: every 1-2 years  Not required if history of left and right mastectomy Mammogram: Not on file        Cervical Cancer Screening Between the ages of 21-29, pap smear recommended once every 3 years  Between the ages of 33-67, can perform pap smear with HPV co-testing every 5 years     Recommendations may differ for women with a history of total hysterectomy, cervical cancer, or abnormal pap smears in past  Pap Smear: Not on file    Screening Not Indicated   Hepatitis C Screening Once for adults born between [facility]  More frequently in patients at high risk for Hepatitis C Hep C Antibody: Not on file        Diabetes Screening 1-2 times per year if you're at risk for diabetes or have pre-diabetes Fasting glucose: 98 mg/dL (5/4/2021)  A1C: No results in last 5 years (No results in last 5 years)      Cholesterol Screening Once every 5 years if you don't have a lipid disorder  May order more often based on risk factors  Lipid panel: 06/07/2022    Screening Not Indicated  History Lipid Disorder     Other Preventive Screenings Covered by Medicare:  1  Abdominal Aortic Aneurysm (AAA) Screening: covered once if your at risk  You're considered to be at risk if you have a family history of AAA  2  Lung Cancer Screening: covers low dose CT scan once per year if you meet all of the following conditions: (1) Age 50-69; (2) No signs or symptoms of lung cancer; (3) Current smoker or have quit smoking within the last 15 years; (4) You have a tobacco smoking history of at least 20 pack years (packs per day multiplied by number of years you smoked); (5) You get a written order from a healthcare provider  3  Glaucoma Screening: covered annually if you're considered high risk: (1) You have diabetes OR (2) Family history of glaucoma OR (3)  aged 48 and older OR (3)  American aged 72 and older  3  Osteoporosis Screening: covered every 2 years if you meet one of the following conditions: (1) You're estrogen deficient and at risk for osteoporosis based off medical history and other findings; (2) Have a vertebral abnormality; (3) On glucocorticoid therapy for more than 3 months; (4) Have primary hyperparathyroidism; (5) On osteoporosis medications and need to assess response to drug therapy  · Last bone density test (DXA Scan): Not on file  5  HIV Screening: covered annually if you're between the age of 12-76  Also covered annually if you are younger than 13 and older than 72 with risk factors for HIV infection   For pregnant patients, it is covered up to 3 times per pregnancy  Immunizations:  Immunization Recommendations   Influenza Vaccine Annual influenza vaccination during flu season is recommended for all persons aged >= 6 months who do not have contraindications   Pneumococcal Vaccine   * Pneumococcal conjugate vaccine = PCV13 (Prevnar 13), PCV15 (Vaxneuvance), PCV20 (Prevnar 20)  * Pneumococcal polysaccharide vaccine = PPSV23 (Pneumovax) Adults 25-60 years old: 1-3 doses may be recommended based on certain risk factors  Adults 72 years old: 1-2 doses may be recommended based off what pneumonia vaccine you previously received   Hepatitis B Vaccine 3 dose series if at intermediate or high risk (ex: diabetes, end stage renal disease, liver disease)   Tetanus (Td) Vaccine - COST NOT COVERED BY MEDICARE PART B Following completion of primary series, a booster dose should be given every 10 years to maintain immunity against tetanus  Td may also be given as tetanus wound prophylaxis  Tdap Vaccine - COST NOT COVERED BY MEDICARE PART B Recommended at least once for all adults  For pregnant patients, recommended with each pregnancy  Shingles Vaccine (Shingrix) - COST NOT COVERED BY MEDICARE PART B  2 shot series recommended in those aged 48 and above     Health Maintenance Due:  There are no preventive care reminders to display for this patient  Immunizations Due:      Topic Date Due    Pneumococcal Vaccine: 65+ Years (2 - PPSV23 or PCV20) 03/18/2018    COVID-19 Vaccine (3 - Booster for Pfizer series) 07/11/2021    Influenza Vaccine (1) 09/01/2022     Advance Directives   What are advance directives? Advance directives are legal documents that state your wishes and plans for medical care  These plans are made ahead of time in case you lose your ability to make decisions for yourself  Advance directives can apply to any medical decision, such as the treatments you want, and if you want to donate organs  What are the types of advance directives?   There are many types of advance directives, and each state has rules about how to use them  You may choose a combination of any of the following:  · Living will: This is a written record of the treatment you want  You can also choose which treatments you do not want, which to limit, and which to stop at a certain time  This includes surgery, medicine, IV fluid, and tube feedings  · Durable power of  for healthcare [facility]): This is a written record that states who you want to make healthcare choices for you when you are unable to make them for yourself  This person, called a proxy, is usually a family member or a friend  You may choose more than 1 proxy  · Do not resuscitate (DNR) order:  A DNR order is used in case your heart stops beating or you stop breathing  It is a request not to have certain forms of treatment, such as CPR  A DNR order may be included in other types of advance directives  · Medical directive: This covers the care that you want if you are in a coma, near death, or unable to make decisions for yourself  You can list the treatments you want for each condition  Treatment may include pain medicine, surgery, blood transfusions, dialysis, IV or tube feedings, and a ventilator (breathing machine)  · Values history: This document has questions about your views, beliefs, and how you feel and think about life  This information can help others choose the care that you would choose  Why are advance directives important? An advance directive helps you control your care  Although spoken wishes may be used, it is better to have your wishes written down  Spoken wishes can be misunderstood, or not followed  Treatments may be given even if you do not want them  An advance directive may make it easier for your family to make difficult choices about your care     Weight Management   Why it is important to manage your weight:  Being overweight increases your risk of health conditions such as heart disease, high blood pressure, type 2 diabetes, and certain types of cancer  It can also increase your risk for osteoarthritis, sleep apnea, and other respiratory problems  Aim for a slow, steady weight loss  Even a small amount of weight loss can lower your risk of health problems  How to lose weight safely:  A safe and healthy way to lose weight is to eat fewer calories and get regular exercise  You can lose up about 1 pound a week by decreasing the number of calories you eat by 500 calories each day  Healthy meal plan for weight management:  A healthy meal plan includes a variety of foods, contains fewer calories, and helps you stay healthy  A healthy meal plan includes the following:  · Eat whole-grain foods more often  A healthy meal plan should contain fiber  Fiber is the part of grains, fruits, and vegetables that is not broken down by your body  Whole-grain foods are healthy and provide extra fiber in your diet  Some examples of whole-grain foods are whole-wheat breads and pastas, oatmeal, brown rice, and bulgur  · Eat a variety of vegetables every day  Include dark, leafy greens such as spinach, kale, ayanna greens, and mustard greens  Eat yellow and orange vegetables such as carrots, sweet potatoes, and winter squash  · Eat a variety of fruits every day  Choose fresh or canned fruit (canned in its own juice or light syrup) instead of juice  Fruit juice has very little or no fiber  · Eat low-fat dairy foods  Drink fat-free (skim) milk or 1% milk  Eat fat-free yogurt and low-fat cottage cheese  Try low-fat cheeses such as mozzarella and other reduced-fat cheeses  · Choose meat and other protein foods that are low in fat  Choose beans or other legumes such as split peas or lentils  Choose fish, skinless poultry (chicken or turkey), or lean cuts of red meat (beef or pork)  Before you cook meat or poultry, cut off any visible fat  · Use less fat and oil  Try baking foods instead of frying them   Add less fat, such as margarine, sour cream, regular salad dressing and mayonnaise to foods  Eat fewer high-fat foods  Some examples of high-fat foods include french fries, doughnuts, ice cream, and cakes  · Eat fewer sweets  Limit foods and drinks that are high in sugar  This includes candy, cookies, regular soda, and sweetened drinks  Exercise:  Exercise at least 30 minutes per day on most days of the week  Some examples of exercise include walking, biking, dancing, and swimming  You can also fit in more physical activity by taking the stairs instead of the elevator or parking farther away from stores  Ask your healthcare provider about the best exercise plan for you  © Copyright [employer] 2018 Information is for End User's use only and may not be sold, redistributed or otherwise used for commercial purposes   All illustrations and images included in CareNotes® are the copyrighted property of A D A M , [employer]  or [address] [place] [address]

## 2022-09-19 ENCOUNTER — TELEPHONE (OUTPATIENT)
Dept: ADMINISTRATIVE | Facility: OTHER | Age: 82
End: 2022-09-19

## 2022-09-19 NOTE — TELEPHONE ENCOUNTER
----- Message from Cal Degroot DO sent at 9/16/2022  2:06 PM EDT -----  09/16/22 2:07 PM    Johnny, our patient Maria Alejandra Harris has had DEXA Scan completed/performed  Please assist in updating the patient chart by pulling the Care Everywhere (CE) document  The date of service is 2019       Done at DR NIC CARDOSO [facility]    Thank you,  Cal Degroot DO  [facility]

## 2022-09-22 NOTE — TELEPHONE ENCOUNTER
Upon review of the In Basket request we were able to locate, review, and update the patient chart as requested for DEXA Scan  Any additional questions or concerns should be emailed to the Practice Liaisons via [email or web address] email, please do not reply via In Basket      Thank you  Yina Velasquez MA

## 2022-10-10 ENCOUNTER — TRANSCRIBE ORDERS (OUTPATIENT)
Dept: SCHEDULING | Age: 82
End: 2022-10-10

## 2022-10-10 DIAGNOSIS — N39.46 MIXED INCONTINENCE: Primary | ICD-10-CM

## 2022-10-12 PROBLEM — Z12.11 COLON CANCER SCREENING: Status: RESOLVED | Noted: 2021-06-14 | Resolved: 2022-10-12

## 2022-11-10 ENCOUNTER — HOSPITAL ENCOUNTER (OUTPATIENT)
Dept: RADIOLOGY | Facility: HOSPITAL | Age: 82
Discharge: HOME | End: 2022-11-10
Attending: UROLOGY
Payer: MEDICARE

## 2022-11-10 DIAGNOSIS — N39.46 MIXED INCONTINENCE: ICD-10-CM

## 2022-11-10 PROCEDURE — 76770 US EXAM ABDO BACK WALL COMP: CPT

## 2022-11-15 DIAGNOSIS — E03.9 ACQUIRED HYPOTHYROIDISM: Primary | ICD-10-CM

## 2022-11-15 RX ORDER — LEVOTHYROXINE SODIUM 0.07 MG/1
75 TABLET ORAL DAILY
Qty: 90 TABLET | Refills: 0 | Status: SHIPPED | OUTPATIENT
Start: 2022-11-15

## 2022-12-08 DIAGNOSIS — I10 ESSENTIAL HYPERTENSION: Primary | ICD-10-CM

## 2022-12-08 DIAGNOSIS — E78.2 MIXED HYPERLIPIDEMIA: ICD-10-CM

## 2022-12-09 RX ORDER — ATORVASTATIN CALCIUM 10 MG/1
10 TABLET, FILM COATED ORAL DAILY
Qty: 90 TABLET | Refills: 1 | Status: SHIPPED | OUTPATIENT
Start: 2022-12-09

## 2022-12-09 RX ORDER — QUINAPRIL 40 MG/1
40 TABLET ORAL EVERY MORNING
Qty: 90 TABLET | Refills: 1 | Status: SHIPPED | OUTPATIENT
Start: 2022-12-09 | End: 2022-12-10

## 2022-12-10 ENCOUNTER — TELEPHONE (OUTPATIENT)
Dept: FAMILY MEDICINE CLINIC | Facility: CLINIC | Age: 82
End: 2022-12-10

## 2022-12-10 DIAGNOSIS — I10 ESSENTIAL HYPERTENSION: Primary | ICD-10-CM

## 2022-12-10 RX ORDER — LISINOPRIL 40 MG/1
40 TABLET ORAL DAILY
Qty: 90 TABLET | Refills: 1 | Status: SHIPPED | OUTPATIENT
Start: 2022-12-10

## 2022-12-10 NOTE — TELEPHONE ENCOUNTER
12/10/2022 12:38 PM Called [facility] Adhik[facility] to discuss that her quinapril is on back order  Medication changed to lisinopril  New Rx sent to pharmacy      Message complete  Franklyn Sauceda, DO

## 2022-12-10 NOTE — TELEPHONE ENCOUNTER
Jong called [facility] Quinipril is on back order   Asking if you would like to send a different ace inhibitor  Thank you  Jane Amado

## 2023-01-03 DIAGNOSIS — K21.00 GASTROESOPHAGEAL REFLUX DISEASE WITH ESOPHAGITIS WITHOUT HEMORRHAGE: ICD-10-CM

## 2023-01-03 RX ORDER — FAMOTIDINE 20 MG/1
20 TABLET, FILM COATED ORAL 2 TIMES DAILY
Qty: 180 TABLET | Refills: 2 | Status: SHIPPED | OUTPATIENT
Start: 2023-01-03

## 2023-01-13 ENCOUNTER — APPOINTMENT (OUTPATIENT)
Dept: LAB | Facility: CLINIC | Age: 83
End: 2023-01-13

## 2023-01-13 DIAGNOSIS — E03.9 ACQUIRED HYPOTHYROIDISM: ICD-10-CM

## 2023-01-13 DIAGNOSIS — I10 ESSENTIAL HYPERTENSION: ICD-10-CM

## 2023-01-13 DIAGNOSIS — Z11.59 NEED FOR HEPATITIS C SCREENING TEST: ICD-10-CM

## 2023-01-13 DIAGNOSIS — E78.00 PURE HYPERCHOLESTEROLEMIA: ICD-10-CM

## 2023-01-13 DIAGNOSIS — E78.2 MIXED HYPERLIPIDEMIA: ICD-10-CM

## 2023-01-13 LAB
ALBUMIN SERPL BCP-MCNC: 3.7 G/DL (ref 3.5–5)
ALP SERPL-CCNC: 69 U/L (ref 46–116)
ALT SERPL W P-5'-P-CCNC: 24 U/L (ref 12–78)
ANION GAP SERPL CALCULATED.3IONS-SCNC: 4 MMOL/L (ref 4–13)
AST SERPL W P-5'-P-CCNC: 19 U/L (ref 5–45)
BILIRUB SERPL-MCNC: 0.47 MG/DL (ref 0.2–1)
BUN SERPL-MCNC: 20 MG/DL (ref 5–25)
CALCIUM SERPL-MCNC: 9.1 MG/DL (ref 8.3–10.1)
CHLORIDE SERPL-SCNC: 104 MMOL/L (ref 96–108)
CHOLEST SERPL-MCNC: 169 MG/DL
CK SERPL-CCNC: 77 U/L (ref 26–192)
CO2 SERPL-SCNC: 29 MMOL/L (ref 21–32)
CREAT SERPL-MCNC: 0.63 MG/DL (ref 0.6–1.3)
ERYTHROCYTE [DISTWIDTH] IN BLOOD BY AUTOMATED COUNT: 13.1 % (ref 11.6–15.1)
GFR SERPL CREATININE-BSD FRML MDRD: 83 ML/MIN/1.73SQ M
GLUCOSE P FAST SERPL-MCNC: 88 MG/DL (ref 65–99)
HCT VFR BLD AUTO: 42.2 % (ref 34.8–46.1)
HCV AB SER QL: NORMAL
HDLC SERPL-MCNC: 60 MG/DL
HGB BLD-MCNC: 13.4 G/DL (ref 11.5–15.4)
LDLC SERPL CALC-MCNC: 94 MG/DL (ref 0–100)
MCH RBC QN AUTO: 27.6 PG (ref 26.8–34.3)
MCHC RBC AUTO-ENTMCNC: 31.8 G/DL (ref 31.4–37.4)
MCV RBC AUTO: 87 FL (ref 82–98)
PLATELET # BLD AUTO: 210 THOUSANDS/UL (ref 149–390)
PMV BLD AUTO: 11.2 FL (ref 8.9–12.7)
POTASSIUM SERPL-SCNC: 4.4 MMOL/L (ref 3.5–5.3)
PROT SERPL-MCNC: 7.2 G/DL (ref 6.4–8.4)
RBC # BLD AUTO: 4.86 MILLION/UL (ref 3.81–5.12)
SODIUM SERPL-SCNC: 137 MMOL/L (ref 135–147)
T4 FREE SERPL-MCNC: 1.38 NG/DL (ref 0.76–1.46)
TRIGL SERPL-MCNC: 73 MG/DL
TSH SERPL DL<=0.05 MIU/L-ACNC: 2.77 UIU/ML (ref 0.45–4.5)
WBC # BLD AUTO: 5.99 THOUSAND/UL (ref 4.31–10.16)

## 2023-01-16 DIAGNOSIS — K21.00 GASTROESOPHAGEAL REFLUX DISEASE WITH ESOPHAGITIS WITHOUT HEMORRHAGE: ICD-10-CM

## 2023-01-16 RX ORDER — FAMOTIDINE 20 MG/1
20 TABLET, FILM COATED ORAL 2 TIMES DAILY
Qty: 180 TABLET | Refills: 0 | Status: SHIPPED | OUTPATIENT
Start: 2023-01-16

## 2023-01-17 ENCOUNTER — RA CDI HCC (OUTPATIENT)
Dept: OTHER | Facility: HOSPITAL | Age: 83
End: 2023-01-17

## 2023-05-03 DIAGNOSIS — E03.9 ACQUIRED HYPOTHYROIDISM: ICD-10-CM

## 2023-05-03 RX ORDER — LEVOTHYROXINE SODIUM 0.07 MG/1
75 TABLET ORAL DAILY
Qty: 90 TABLET | Refills: 1 | Status: SHIPPED | OUTPATIENT
Start: 2023-05-03

## 2023-05-21 DIAGNOSIS — I10 ESSENTIAL HYPERTENSION: ICD-10-CM

## 2023-05-22 RX ORDER — LISINOPRIL 40 MG/1
TABLET ORAL
Qty: 90 TABLET | Refills: 1 | Status: SHIPPED | OUTPATIENT
Start: 2023-05-22

## 2023-06-07 ENCOUNTER — OFFICE VISIT (OUTPATIENT)
Dept: GASTROENTEROLOGY | Facility: CLINIC | Age: 83
End: 2023-06-07
Payer: MEDICARE

## 2023-06-07 ENCOUNTER — TELEPHONE (OUTPATIENT)
Dept: GASTROENTEROLOGY | Facility: CLINIC | Age: 83
End: 2023-06-07

## 2023-06-07 VITALS
WEIGHT: 152.2 LBS | DIASTOLIC BLOOD PRESSURE: 97 MMHG | SYSTOLIC BLOOD PRESSURE: 153 MMHG | HEART RATE: 69 BPM | HEIGHT: 61 IN | BODY MASS INDEX: 28.74 KG/M2

## 2023-06-07 DIAGNOSIS — K52.832 LYMPHOCYTIC COLITIS: ICD-10-CM

## 2023-06-07 DIAGNOSIS — K21.00 GASTROESOPHAGEAL REFLUX DISEASE WITH ESOPHAGITIS WITHOUT HEMORRHAGE: Primary | ICD-10-CM

## 2023-06-07 DIAGNOSIS — K22.70 BARRETT'S ESOPHAGUS WITHOUT DYSPLASIA: ICD-10-CM

## 2023-06-07 DIAGNOSIS — R10.10 UPPER ABDOMINAL PAIN: ICD-10-CM

## 2023-06-07 DIAGNOSIS — K75.4 AUTOIMMUNE HEPATITIS (HCC): ICD-10-CM

## 2023-06-07 DIAGNOSIS — Z12.11 COLON CANCER SCREENING: ICD-10-CM

## 2023-06-07 PROCEDURE — 99214 OFFICE O/P EST MOD 30 MIN: CPT | Performed by: NURSE PRACTITIONER

## 2023-06-07 RX ORDER — CHLORTHALIDONE 25 MG/1
25 TABLET ORAL
COMMUNITY
Start: 2023-05-15 | End: 2024-05-14

## 2023-06-07 NOTE — PATIENT INSTRUCTIONS
-Increase Pepcid to twice daily  -Increase Miralax to daily dosing  -We will schedule EGD  -Next colonoscopy is due in 2025    GERD (Gastroesophageal Reflux Disease)   WHAT YOU NEED TO KNOW:   Gastroesophageal reflux disease (GERD) is reflux that happens more than 2 times a week for a few weeks  Reflux means acid and food in your stomach back up into your esophagus  GERD can cause other health problems over time if it is not treated  DISCHARGE INSTRUCTIONS:   Call your local emergency number (911 in the [address] [facility]) if:   You have severe chest pain and sudden trouble breathing  Return to the emergency department if:   You have trouble breathing after you vomit  You have trouble swallowing, or pain with swallowing  Your bowel movements are black, bloody, or tarry-looking  Your vomit looks like coffee grounds or has blood in it  Call your doctor or gastroenterologist if:   You feel full and cannot burp or vomit  You vomit large amounts, or you vomit often  You are losing weight without trying  Your symptoms get worse or do not improve with treatment  You have questions or concerns about your condition or care  Medicines:   Medicines  are used to decrease stomach acid  Medicine may also be used to help your lower esophageal sphincter and stomach contract (tighten) more  Take your medicine as directed  Contact your healthcare provider if you think your medicine is not helping or if you have side effects  Tell your provider if you are allergic to any medicine  Keep a list of the medicines, vitamins, and herbs you take  Include the amounts, and when and why you take them  Bring the list or the pill bottles to follow-up visits  Carry your medicine list with you in case of an emergency  Manage GERD:       Do not have foods or drinks that may increase heartburn  These include chocolate, peppermint, fried or fatty foods, drinks that contain caffeine, or carbonated drinks (soda)   Other foods include spicy foods, onions, tomatoes, and tomato-based foods  Do not have foods or drinks that can irritate your esophagus, such as citrus fruits, juices, and alcohol  Do not eat large meals  When you eat a lot of food at one time, your stomach needs more acid to digest it  Eat 6 small meals each day instead of 3 large ones, and eat slowly  Do not eat meals 2 to 3 hours before bedtime  Elevate the head of your bed  Place 6-inch blocks under the head of your bed frame  You may also use more than one pillow under your head and shoulders while you sleep  Maintain a healthy weight  If you are overweight, weight loss may help relieve symptoms of GERD  Do not smoke  Smoking weakens the lower esophageal sphincter and increases the risk of GERD  Ask your healthcare provider for information if you currently smoke and need help to quit  E-cigarettes or smokeless tobacco still contain nicotine  Talk to your healthcare provider before you use these products  Do not put pressure on your abdomen  Pressure pushes acid up into your esophagus  Do not wear clothing that is tight around your waist  Do not bend over  Bend at the knees if you need to pick something up  Follow up with your doctor or gastroenterologist as directed:  Write down your questions so you remember to ask them during your visits  © Copyright [facility] 2022 Information is for End User's use only and may not be sold, redistributed or otherwise used for commercial purposes  The above information is an  only  It is not intended as medical advice for individual conditions or treatments  Talk to your doctor, nurse or pharmacist before following any medical regimen to see if it is safe and effective for you

## 2023-06-07 NOTE — PROGRESS NOTES
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  Currently on Pepcid 1-2 times a day  Due for De Santiago's surveillance  Last EGD in July 2020 showed esophagitis, antritis, duodenitis, and 4 fundic appearing polyps removed  Biopsies consistent with De Santiago's esophagus at that time    -Continue antireflux diet, working on weight loss  -Avoid late night meals and dietary triggers  -Increase Pepcid to twice daily  -EGD scheduled  Prep and procedure explained  -If increasing Pepcid to twice a day is ineffective, patient may need to switch to PPI for upper abdominal burning pending course  -     EGD; Future; Expected date: 06/07/2023      3  Upper abdominal pain  Reports constant burning epigastric pain, does not notice any aggravating/alleviating factors  She is not sure if this is worsened with oral intake or movement  Denies any NSAID use  No longer has a gallbladder  Differentials include gastritis, PUD, H  pylori infection, radicular pain from back issues, pancreas   -We will evaluate with EGD as above  -If EGD is unremarkable and symptoms do not improve with increased dose of Pepcid, consider CT abdomen pelvis    4  Lymphocytic colitis  Asymptomatic    5  Slow transit constipation  Currently taking MiraLAX every other day and still struggling to move her bowels on occasion   -Increase MiraLAX to daily dosing  -Maintain high-fiber diet, adequate hydration    6  Colon cancer screening  Next colonoscopy due in October 2025    7   Autoimmune hepatitis ([facility] [address] )  Last LFTs in January WNL      ______________________________________________________________________    SUBJECTIVE:  Aleks German is a 79 y o  female with history of GERD, De Santiago's esophagus, lymphocytic colitis, autoimmune hepatitis, constipation, fibromyalgia, HLD, HTN, cholecystectomy presenting for follow-up for EGD consultation  She is currently taking Pepcid 20 mg daily and sometimes twice a day on occasion  Reports persistent epigastric burning, and does not note any aggravating/alleviating factors  Denies any NSAID use  Does not have a gallbladder  She does have spinal issues, but does not notice any worsening with movement  She has a history of lymphocytic colitis but has not had any problems with diarrhea recently  She is taking MiraLAX every other day for history of constipation and is sometimes still struggling to move her bowels with this  Denies any weight loss, but has maybe gained some  She is up-to-date with her colon cancer screening, last colonoscopy was in October 2015 and she was given a 10-year recall  She has a history of autoimmune hepatitis, currently not on any medications her last LFTs in January were normal     Answers for HPI/ROS submitted by the patient on 6/4/2023  Chronicity: recurrent  Onset: more than 1 year ago  Onset quality: gradual  Frequency: every several days  Pain location: epigastric region  Pain - numeric: 6/10  Pain quality: aching, burning, sharp  Radiates to: LUQ  anorexia: No  arthralgias: Yes  belching: Yes  constipation: No  diarrhea: No  dysuria: No  fever: No  flatus: Yes  frequency: Yes  headaches: Yes  hematochezia: No  hematuria: No  melena: No  myalgias: Yes  nausea: Yes  weight loss: No  vomiting: No  Aggravated by: eating    REVIEW OF SYSTEMS IS OTHERWISE NEGATIVE        Historical Information   Past Medical History:   Diagnosis Date   • Arthritis     osteo   • Autoimmune hepatitis ([facility] [address] )    • Back pain with radiation     to bilat legs   • De Santiago esophagus    • De Santiago's esophagus    • Chronic GERD    • Chronic uveitis     bilateral   • CPAP (continuous positive airway pressure) dependence    • Diabetes mellitus (HCC)     diet controlled   • Disease of thyroid gland hypo   • Diverticulitis    • Fibromyalgia syndrome    • Fibromyalgia, primary    • Hiatal hernia    • Hx of colonoscopy 2016    EGD done simultaneously   • Hyperlipidemia    • Hypertension    • Infectious viral hepatitis    • PONV (postoperative nausea and vomiting)    • Sinus congestion    • Sleep apnea     CPAP rx, does not use mask     Past Surgical History:   Procedure Laterality Date   • BILATERAL SALPINGOOPHORECTOMY  1996   • BREAST BIOPSY Right    • CARPAL TUNNEL RELEASE Right 2004   • CHOLECYSTECTOMY     • COLONOSCOPY     • FRACTURE SURGERY Right     ORIF femur, emily in place   • [address]   • JOINT REPLACEMENT Right 2004    knee   • JOINT REPLACEMENT Left 2007    knee   • JOINT REPLACEMENT Right 2011    hip   • JOINT REPLACEMENT Left 2017    knee   • WA XCAPSL CTRC RMVL INSJ IO LENS PROSTH W/O ECP Right 08/22/2016    Procedure: EXTRACTION EXTRACAPSULAR CATARACT PHACO INTRAOCULAR LENS (IOL); Surgeon: Nando Wild MD;  Location: [facility] MAIN OR;  Service: Ophthalmology   • WA XCAPSL CTRC RMVL INSJ IO LENS PROSTH W/O ECP Left 11/26/2018    Procedure: EXTRACTION EXTRACAPSULAR CATARACT PHACO INTRAOCULAR LENS (IOL);   Surgeon: Nando Wild MD;  Location: [facility] MAIN OR;  Service: Ophthalmology   • UPPER GASTROINTESTINAL ENDOSCOPY       Social History   Social History     Substance and Sexual Activity   Alcohol Use No     Social History     Substance and Sexual Activity   Drug Use Never     Social History     Tobacco Use   Smoking Status Never   Smokeless Tobacco Never     Family History   Problem Relation Age of Onset   • Heart disease Mother    • Diverticulitis Mother    • Stroke Father    • COPD Sister    • Diabetes Brother    • Cancer Maternal Grandmother         breast   • Pancreatic cancer Cousin        Meds/Allergies       Current Outpatient Medications:   •  acetaminophen (TYLENOL) 325 mg tablet  •  amLODIPine (NORVASC) 5 mg tablet  •  atorvastatin (LIPITOR) 10 mg tablet  •  Calcium "Carbonate (CALTRATE 600 PO)  •  carvedilol (COREG) 12 5 mg tablet  •  carvedilol (COREG) 3 125 mg tablet  •  chlorthalidone 25 mg tablet  •  famotidine (PEPCID) 20 mg tablet  •  levothyroxine 75 mcg tablet  •  polyethylene glycol (MIRALAX) 17 g packet  •  Cholecalciferol (VITAMIN D-3 PO)  •  darifenacin (ENABLEX) 7 5 MG 24 hr tablet  •  gabapentin (NEURONTIN) 300 mg capsule  •  lisinopril (ZESTRIL) 40 mg tablet    Allergies   Allergen Reactions   • Biaxin [Clarithromycin] GI Intolerance   • Codeine Other (See Comments)     dizzy   • Latex Hives     Blisters and hives local reaction   • Levaquin [Levofloxacin In D5w] GI Intolerance   • Morphine And Related GI Intolerance   • Nsaids GI Intolerance   • Oxycodone Other (See Comments)     dizzy   • Percocet [Oxycodone-Acetaminophen] Other (See Comments)     dizzy   • Ultram [Tramadol Hcl] Other (See Comments)     dizzy           Objective     Blood pressure 153/97, pulse 69, height 5' 1\" (1 549 m), weight 69 kg (152 lb 3 2 oz)  Body mass index is 28 76 kg/m²  PHYSICAL EXAM:      General Appearance:   Alert, cooperative, no distress   HEENT:   Normocephalic, atraumatic, anicteric  Neck:  Supple, symmetrical, trachea midline   Lungs:   Clear to auscultation bilaterally; no rales, rhonchi or wheezing; respirations unlabored    Heart[de-identified]   Regular rate and rhythm; no murmur  Abdomen:   Soft, epigastric tender, non-distended; normal bowel sounds; no masses, no organomegaly    Genitalia:   Deferred    Rectal:   Deferred    Extremities:  No cyanosis, clubbing or edema    Skin:  No jaundice, rashes, or lesions    Lymph nodes:  No palpable cervical lymphadenopathy        Lab Results:   No visits with results within 1 Day(s) from this visit     Latest known visit with results is:   Appointment on 01/13/2023   Component Date Value   • WBC 01/13/2023 5 99    • RBC 01/13/2023 4 86    • Hemoglobin 01/13/2023 13 4    • Hematocrit 01/13/2023 42 2    • MCV 01/13/2023 87    • MCH " 01/13/2023 27 6    • MCHC 01/13/2023 31 8    • RDW 01/13/2023 13 1    • Platelets 67/89/3042 210    • MPV 01/13/2023 11 2    • Sodium 01/13/2023 137    • Potassium 01/13/2023 4 4    • Chloride 01/13/2023 104    • CO2 01/13/2023 29    • ANION GAP 01/13/2023 4    • BUN 01/13/2023 20    • Creatinine 01/13/2023 0 63    • Glucose, Fasting 01/13/2023 88    • Calcium 01/13/2023 9 1    • AST 01/13/2023 19    • ALT 01/13/2023 24    • Alkaline Phosphatase 01/13/2023 69    • Total Protein 01/13/2023 7 2    • Albumin 01/13/2023 3 7    • Total Bilirubin 01/13/2023 0 47    • eGFR 01/13/2023 83    • Cholesterol 01/13/2023 169    • Triglycerides 01/13/2023 73    • HDL, Direct 01/13/2023 60    • LDL Calculated 01/13/2023 94    • TSH 3RD GENERATON 01/13/2023 2 770    • Free T4 01/13/2023 1 38    • Hepatitis C Ab 01/13/2023 Non-reactive    • Total CK 01/13/2023 77          Radiology Results:   No results found

## 2023-06-07 NOTE — TELEPHONE ENCOUNTER
Procedure: EGD  Scheduled date of procedure (as of today):07/13/23  Physician performing procedure:Damion  Location of procedure:[facility]  Instructions reviewed with patient by: ti  Clearances: n/a

## 2023-06-13 DIAGNOSIS — E78.2 MIXED HYPERLIPIDEMIA: ICD-10-CM

## 2023-06-13 RX ORDER — ATORVASTATIN CALCIUM 10 MG/1
10 TABLET, FILM COATED ORAL DAILY
Qty: 90 TABLET | Refills: 1 | Status: SHIPPED | OUTPATIENT
Start: 2023-06-13 | End: 2023-06-16 | Stop reason: SDUPTHER

## 2023-06-15 DIAGNOSIS — E78.2 MIXED HYPERLIPIDEMIA: ICD-10-CM

## 2023-06-15 RX ORDER — ATORVASTATIN CALCIUM 10 MG/1
10 TABLET, FILM COATED ORAL DAILY
Qty: 90 TABLET | Refills: 1 | OUTPATIENT
Start: 2023-06-15

## 2023-06-15 NOTE — TELEPHONE ENCOUNTER
Spoke to the pharmacist at [facility], she stated that it says denied on her end but she is not sure why  She is asking that we resend the Atorvastatin       Nikia Arevalo LPN

## 2023-06-15 NOTE — TELEPHONE ENCOUNTER
Patient left a voicemail stating [facility] will not fill her Atorvastatin because it is waiting for authorization from us  I see this was sent on 6/13/23  Can we call [facility] and find out what is going on?     Fermin Lopes, SARAHN

## 2023-06-16 RX ORDER — ATORVASTATIN CALCIUM 10 MG/1
10 TABLET, FILM COATED ORAL DAILY
Qty: 90 TABLET | Refills: 1 | Status: SHIPPED | OUTPATIENT
Start: 2023-06-16

## 2023-07-12 RX ORDER — SODIUM CHLORIDE, SODIUM LACTATE, POTASSIUM CHLORIDE, CALCIUM CHLORIDE 600; 310; 30; 20 MG/100ML; MG/100ML; MG/100ML; MG/100ML
75 INJECTION, SOLUTION INTRAVENOUS CONTINUOUS
Status: CANCELLED | OUTPATIENT
Start: 2023-07-12

## 2023-07-13 ENCOUNTER — ANESTHESIA EVENT (OUTPATIENT)
Dept: GASTROENTEROLOGY | Facility: AMBULARY SURGERY CENTER | Age: 83
End: 2023-07-13

## 2023-07-13 ENCOUNTER — ANESTHESIA (OUTPATIENT)
Dept: GASTROENTEROLOGY | Facility: AMBULARY SURGERY CENTER | Age: 83
End: 2023-07-13

## 2023-07-13 ENCOUNTER — HOSPITAL ENCOUNTER (OUTPATIENT)
Dept: GASTROENTEROLOGY | Facility: AMBULARY SURGERY CENTER | Age: 83
Setting detail: OUTPATIENT SURGERY
End: 2023-07-13
Attending: INTERNAL MEDICINE
Payer: MEDICARE

## 2023-07-13 VITALS
TEMPERATURE: 98.2 F | BODY MASS INDEX: 31.49 KG/M2 | OXYGEN SATURATION: 95 % | DIASTOLIC BLOOD PRESSURE: 68 MMHG | SYSTOLIC BLOOD PRESSURE: 125 MMHG | WEIGHT: 150 LBS | HEIGHT: 58 IN | HEART RATE: 76 BPM | RESPIRATION RATE: 18 BRPM

## 2023-07-13 DIAGNOSIS — K22.70 BARRETT'S ESOPHAGUS WITHOUT DYSPLASIA: ICD-10-CM

## 2023-07-13 DIAGNOSIS — K21.00 GASTROESOPHAGEAL REFLUX DISEASE WITH ESOPHAGITIS WITHOUT HEMORRHAGE: ICD-10-CM

## 2023-07-13 DIAGNOSIS — R10.10 UPPER ABDOMINAL PAIN: ICD-10-CM

## 2023-07-13 PROBLEM — G47.30 SLEEP APNEA: Status: ACTIVE | Noted: 2023-07-13

## 2023-07-13 PROBLEM — Z96.641 S/P TOTAL RIGHT HIP ARTHROPLASTY: Status: ACTIVE | Noted: 2023-07-13

## 2023-07-13 PROBLEM — Z96.653 S/P TOTAL KNEE ARTHROPLASTY, BILATERAL: Status: ACTIVE | Noted: 2023-07-13

## 2023-07-13 PROCEDURE — 43239 EGD BIOPSY SINGLE/MULTIPLE: CPT | Performed by: INTERNAL MEDICINE

## 2023-07-13 PROCEDURE — 88305 TISSUE EXAM BY PATHOLOGIST: CPT | Performed by: PATHOLOGY

## 2023-07-13 RX ORDER — SODIUM CHLORIDE, SODIUM LACTATE, POTASSIUM CHLORIDE, CALCIUM CHLORIDE 600; 310; 30; 20 MG/100ML; MG/100ML; MG/100ML; MG/100ML
75 INJECTION, SOLUTION INTRAVENOUS CONTINUOUS
Status: DISCONTINUED | OUTPATIENT
Start: 2023-07-13 | End: 2023-07-17 | Stop reason: HOSPADM

## 2023-07-13 RX ORDER — LIDOCAINE HYDROCHLORIDE 20 MG/ML
INJECTION, SOLUTION EPIDURAL; INFILTRATION; INTRACAUDAL; PERINEURAL AS NEEDED
Status: DISCONTINUED | OUTPATIENT
Start: 2023-07-13 | End: 2023-07-13

## 2023-07-13 RX ORDER — PROPOFOL 10 MG/ML
INJECTION, EMULSION INTRAVENOUS AS NEEDED
Status: DISCONTINUED | OUTPATIENT
Start: 2023-07-13 | End: 2023-07-13

## 2023-07-13 RX ADMIN — SODIUM CHLORIDE, SODIUM LACTATE, POTASSIUM CHLORIDE, AND CALCIUM CHLORIDE: .6; .31; .03; .02 INJECTION, SOLUTION INTRAVENOUS at 08:02

## 2023-07-13 RX ADMIN — PROPOFOL 50 MG: 10 INJECTION, EMULSION INTRAVENOUS at 08:24

## 2023-07-13 RX ADMIN — LIDOCAINE HYDROCHLORIDE 100 MG: 20 INJECTION, SOLUTION EPIDURAL; INFILTRATION; INTRACAUDAL; PERINEURAL at 08:20

## 2023-07-13 RX ADMIN — PROPOFOL 100 MG: 10 INJECTION, EMULSION INTRAVENOUS at 08:20

## 2023-07-13 NOTE — H&P
History and Physical - [facility]  Raymond Yap 79 y.o. female MRN: [id number]                  HPI: Raymond Yap is a 79y.o. year old female who presents for upper endoscopy due to gastroesophageal reflux, De Santiago's esophagus and upper abdominal discomfort. REVIEW OF SYSTEMS: Per the HPI, and otherwise unremarkable. Historical Information   Past Medical History:   Diagnosis Date   • Arthritis     osteo   • Autoimmune hepatitis ([address])    • Back pain with radiation     to bilat legs   • De Santiago esophagus    • De Santiago's esophagus    • Chronic GERD    • Chronic uveitis     bilateral   • CPAP (continuous positive airway pressure) dependence    • Diabetes mellitus (HCC)     diet controlled   • Disease of thyroid gland     hypo   • Diverticulitis    • Fibromyalgia syndrome    • Fibromyalgia, primary    • Hiatal hernia    • Hx of colonoscopy 2016    EGD done simultaneously   • Hyperlipidemia    • Hypertension    • Infectious viral hepatitis    • PONV (postoperative nausea and vomiting)    • Sinus congestion    • Sleep apnea     CPAP rx, does not use mask     Past Surgical History:   Procedure Laterality Date   • BILATERAL SALPINGOOPHORECTOMY  1996   • BREAST BIOPSY Right    • CARPAL TUNNEL RELEASE Right 2004   • CHOLECYSTECTOMY     • COLONOSCOPY     • FRACTURE SURGERY Right     ORIF femur, emily in place   • HYSTERECTOMY  1974    GOPAL   • JOINT REPLACEMENT Right 2004    knee   • JOINT REPLACEMENT Left 2007    knee   • JOINT REPLACEMENT Right 2011    hip   • JOINT REPLACEMENT Left 2017    knee   • [place] XCAPSL CTRC RMVL INSJ IO LENS PROSTH W/O ECP Right 08/22/2016    Procedure: EXTRACTION EXTRACAPSULAR CATARACT PHACO INTRAOCULAR LENS (IOL); Surgeon: Kirstin De La Paz MD;  Location: [facility] MAIN OR;  Service: Ophthalmology   • HI XCAPSL CTRC RMVL INSJ IO LENS PROSTH W/O ECP Left 11/26/2018    Procedure: EXTRACTION EXTRACAPSULAR CATARACT PHACO INTRAOCULAR LENS (IOL);   Surgeon: Kirstin De La Paz MD;  Location: [facility] MAIN OR;  Service: Ophthalmology   • UPPER GASTROINTESTINAL ENDOSCOPY       Social History   Social History     Substance and Sexual Activity   Alcohol Use No     Social History     Substance and Sexual Activity   Drug Use Never     Social History     Tobacco Use   Smoking Status Never   • Passive exposure: Past   Smokeless Tobacco Never     Family History   Problem Relation Age of Onset   • Heart disease Mother    • Diverticulitis Mother    • Stroke Father    • COPD Sister    • Diabetes Brother    • Cancer Maternal Grandmother         breast   • Pancreatic cancer Cousin        Meds/Allergies       Current Outpatient Medications:   •  acetaminophen (TYLENOL) 325 mg tablet  •  amLODIPine (NORVASC) 5 mg tablet  •  atorvastatin (LIPITOR) 10 mg tablet  •  carvedilol (COREG) 12.5 mg tablet  •  carvedilol (COREG) 3.125 mg tablet  •  levothyroxine 75 mcg tablet  •  lisinopril (ZESTRIL) 40 mg tablet  •  famotidine (PEPCID) 20 mg tablet  •  polyethylene glycol (MIRALAX) 17 g packet    Current Facility-Administered Medications:   •  lactated ringers infusion, 75 mL/hr, Intravenous, Continuous    Allergies   Allergen Reactions   • Biaxin [Clarithromycin] GI Intolerance   • Codeine Other (See Comments)     dizzy   • Latex Hives     Blisters and hives local reaction   • Levaquin [Levofloxacin In D5w] GI Intolerance   • Morphine And Related GI Intolerance   • Nsaids GI Intolerance   • Oxycodone Other (See Comments)     dizzy   • Percocet [Oxycodone-Acetaminophen] Other (See Comments)     dizzy   • Ultram [Tramadol Hcl] Other (See Comments)     dizzy       Objective     /72   Pulse 67   Temp 98.2 °F (36.8 °C) (Temporal)   Resp 18   Ht 4' 10" (1.473 m)   Wt 68 kg (150 lb)   SpO2 97%   BMI 31.35 kg/m²       PHYSICAL EXAM    Gen: NAD  Head: NCAT  CV: RRR  CHEST: Clear  ABD: soft, NT/ND  EXT: no edema      ASSESSMENT/PLAN:  This is a 79y.o. year old female here for upper endoscopy, and she is stable and optimized for her procedure.

## 2023-07-13 NOTE — ANESTHESIA POSTPROCEDURE EVALUATION
Post-Op Assessment Note    CV Status:  Stable  Pain Score: 0    Pain management: adequate     Mental Status:  Sleepy   Hydration Status:  Stable   PONV Controlled:  None   Airway Patency:  Patent   Two or more mitigation strategies used for obstructive sleep apnea   Post Op Vitals Reviewed: Yes      Staff: CRNA         No notable events documented.     BP   101/54   Temp 97   Pulse 75   Resp 16   SpO2 99

## 2023-07-13 NOTE — ANESTHESIA PREPROCEDURE EVALUATION
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 Additional Monitors:   Airway Plan:           Plan Factors-    Chart reviewed. Patient is not a current smoker. Induction- intravenous. Postoperative Plan-     Informed Consent- Anesthetic plan and risks discussed with patient. I personally reviewed this patient with the CRNA. Discussed and agreed on the Anesthesia Plan with the CRNA. Steve Alcazar

## 2023-07-17 PROCEDURE — 88305 TISSUE EXAM BY PATHOLOGIST: CPT | Performed by: PATHOLOGY

## 2023-07-20 ENCOUNTER — RA CDI HCC (OUTPATIENT)
Dept: OTHER | Facility: HOSPITAL | Age: 83
End: 2023-07-20

## 2023-07-28 ENCOUNTER — OFFICE VISIT (OUTPATIENT)
Dept: FAMILY MEDICINE CLINIC | Facility: CLINIC | Age: 83
End: 2023-07-28
Payer: MEDICARE

## 2023-07-28 VITALS
WEIGHT: 150 LBS | BODY MASS INDEX: 31.49 KG/M2 | SYSTOLIC BLOOD PRESSURE: 132 MMHG | RESPIRATION RATE: 16 BRPM | DIASTOLIC BLOOD PRESSURE: 70 MMHG | HEIGHT: 58 IN | TEMPERATURE: 97.6 F | HEART RATE: 51 BPM

## 2023-07-28 DIAGNOSIS — E03.9 ACQUIRED HYPOTHYROIDISM: ICD-10-CM

## 2023-07-28 DIAGNOSIS — I10 ESSENTIAL HYPERTENSION: ICD-10-CM

## 2023-07-28 DIAGNOSIS — E78.2 MIXED HYPERLIPIDEMIA: Primary | ICD-10-CM

## 2023-07-28 DIAGNOSIS — K21.00 GASTROESOPHAGEAL REFLUX DISEASE WITH ESOPHAGITIS WITHOUT HEMORRHAGE: ICD-10-CM

## 2023-07-28 PROCEDURE — 99214 OFFICE O/P EST MOD 30 MIN: CPT | Performed by: FAMILY MEDICINE

## 2023-07-28 RX ORDER — SPIRONOLACTONE 25 MG/1
TABLET ORAL
COMMUNITY
Start: 2023-07-20

## 2023-07-28 NOTE — PROGRESS NOTES
Name: Shelley Ramirez      : 1940      MRN: [id number]  Encounter Provider: Gwen Cordon DO  Encounter Date: 2023   Encounter department: [facility]     1. Mixed hyperlipidemia  Assessment & Plan:  Stable  Continue atorvastatin 10 mg daily    Orders:  -     Comprehensive metabolic panel; Future  -     Lipid Panel with Direct LDL reflex; Future    2. Acquired hypothyroidism  Assessment & Plan:  Stable   Continue levothyroxine 75 mcg     Orders:  -     TSH, 3rd generation; Future  -     T4, free; Future    3. Essential hypertension  Assessment & Plan:  Well controlled     Orders:  -     CBC; Future  -     Comprehensive metabolic panel; Future  -     Lipid Panel with Direct LDL reflex; Future    4. Gastroesophageal reflux disease with esophagitis without hemorrhage  Assessment & Plan:  Reviewed recent EGD  Continue famotidine        Noted change in bowel movements  Recommend that she contact her gastroenterologist for follow-up     Return in about 3 months (around 10/28/2023) for Annual Wellness Visit. Subjective      Patient here today for follow-up. She has been seeing Dr. Gurmeet Flynn for her blood pressure and it has been somewhat irregular. She is monitoring regularly. She has had of stressful last year. Her  had a pacemaker put in. She did just see Dr. Yaima Tineo    She has been feeling a bit more anxious. She think she may be internalizing some of it. Review of Systems    Current Outpatient Medications on File Prior to Visit   Medication Sig   • acetaminophen (TYLENOL) 325 mg tablet Take 650 mg by mouth every 6 (six) hours as needed for mild pain.    • amLODIPine (NORVASC) 5 mg tablet Take 1 tablet (5 mg total) by mouth every morning   • atorvastatin (LIPITOR) 10 mg tablet Take 1 tablet (10 mg total) by mouth daily   • carvedilol (COREG) 12.5 mg tablet Take 1 tablet (12.5 mg total) by mouth 2 (two) times a day with meals   • carvedilol (COREG) 3.125 mg tablet Take 3.125 mg by mouth 2 (two) times a day with meals   • levothyroxine 75 mcg tablet Take 1 tablet (75 mcg total) by mouth in the morning   • lisinopril (ZESTRIL) 40 mg tablet TAKE ONE TABLET BY MOUTH EVERY DAY   • polyethylene glycol (MIRALAX) 17 g packet Take 17 g by mouth every other day   • spironolactone (ALDACTONE) 25 mg tablet    • famotidine (PEPCID) 20 mg tablet Take 1 tablet (20 mg total) by mouth 2 (two) times a day (Patient not taking: Reported on 7/28/2023)       Objective     /70   Pulse (!) 51   Temp 97.6 °F (36.4 °C) (Tympanic)   Resp 16   Ht 4' 10" (1.473 m)   Wt 68 kg (150 lb)   BMI 31.35 kg/m²     Physical Exam  Mehreen [facility] DO

## 2023-07-31 ENCOUNTER — APPOINTMENT (OUTPATIENT)
Dept: LAB | Facility: CLINIC | Age: 83
End: 2023-07-31
Payer: MEDICARE

## 2023-07-31 DIAGNOSIS — I05.9 RHEUMATIC MITRAL VALVE DISEASE, UNSPECIFIED: ICD-10-CM

## 2023-07-31 DIAGNOSIS — I10 ESSENTIAL (PRIMARY) HYPERTENSION: ICD-10-CM

## 2023-07-31 LAB
ANION GAP SERPL CALCULATED.3IONS-SCNC: 2 MMOL/L
BUN SERPL-MCNC: 24 MG/DL (ref 5–25)
CALCIUM SERPL-MCNC: 9.1 MG/DL (ref 8.3–10.1)
CHLORIDE SERPL-SCNC: 110 MMOL/L (ref 96–108)
CO2 SERPL-SCNC: 26 MMOL/L (ref 21–32)
CREAT SERPL-MCNC: 0.65 MG/DL (ref 0.6–1.3)
GFR SERPL CREATININE-BSD FRML MDRD: 81 ML/MIN/1.73SQ M
GLUCOSE P FAST SERPL-MCNC: 101 MG/DL (ref 65–99)
POTASSIUM SERPL-SCNC: 4.1 MMOL/L (ref 3.5–5.3)
SODIUM SERPL-SCNC: 138 MMOL/L (ref 135–147)

## 2023-07-31 PROCEDURE — 80048 BASIC METABOLIC PNL TOTAL CA: CPT

## 2023-07-31 PROCEDURE — 36415 COLL VENOUS BLD VENIPUNCTURE: CPT

## 2023-08-21 ENCOUNTER — OFFICE VISIT (OUTPATIENT)
Dept: URGENT CARE | Facility: CLINIC | Age: 83
End: 2023-08-21

## 2023-08-21 ENCOUNTER — APPOINTMENT (OUTPATIENT)
Dept: RADIOLOGY | Facility: CLINIC | Age: 83
End: 2023-08-21
Payer: MEDICARE

## 2023-08-21 VITALS
HEART RATE: 71 BPM | HEIGHT: 58 IN | OXYGEN SATURATION: 97 % | BODY MASS INDEX: 32.12 KG/M2 | SYSTOLIC BLOOD PRESSURE: 160 MMHG | RESPIRATION RATE: 16 BRPM | DIASTOLIC BLOOD PRESSURE: 96 MMHG | WEIGHT: 153 LBS | TEMPERATURE: 99 F
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
## 2023-08-21 DIAGNOSIS — S62.234A CLOSED NONDISPLACED FRACTURE OF BASE OF FIRST METACARPAL BONE OF RIGHT HAND, UNSPECIFIED FRACTURE MORPHOLOGY, INITIAL ENCOUNTER: Primary | ICD-10-CM

## 2023-08-21 DIAGNOSIS — M79.641 RIGHT HAND PAIN: ICD-10-CM

## 2023-08-21 PROCEDURE — 73130 X-RAY EXAM OF HAND: CPT

## 2023-08-21 NOTE — PROGRESS NOTES
[facility]        NAME: Linette Gutiérrez is a 79 y.o. female  : 1940    MRN: [id number]  DATE: 2023  TIME: 9:44 AM    Assessment and Plan   Closed nondisplaced fracture of base of first metacarpal bone of right hand, unspecified fracture morphology, initial encounter [S62.234A]  1. Closed nondisplaced fracture of base of first metacarpal bone of right hand, unspecified fracture morphology, initial encounter        2. Right hand pain  XR hand 3+ vw right          Patient Instructions   Fracture of 5th metacarpal  xrays closed fracture of base of 5th metacarpal, nondisplaced  Splint applied  Ref to ortho  RICE- rest, ice (20 min on, 20 min off), compression with ace bandage, and elevation while at home. Follow up with PCP in 3-5 days. Proceed to  ER if symptoms worsen. Chief Complaint     Chief Complaint   Patient presents with   • Arm Pain     Approximately two weeks ago patient fell injuring her Rt side. States she is having pain in her RT hand. Has been using Tylenol for pain and using ice. History of Present Illness       Denise Harper is an 48-year-old female who presents to clinic complaining of right hand pain x2 weeks. She states that 2 weeks ago she missed a step and landed on her right side. She did hit her head and had a head laceration but denied any loss of consciousness. She also notes bruising on the right side of her chest but that resolved. She is still complaining of right hand pain with swelling and some bruising. She denies any numbness. Review of Systems   Review of Systems   Constitutional: Negative. Musculoskeletal: Positive for arthralgias and joint swelling. Skin: Positive for color change. Negative for wound. Neurological: Negative for numbness.          Current Medications       Current Outpatient Medications:   •  acetaminophen (TYLENOL) 325 mg tablet, Take 650 mg by mouth every 6 (six) hours as needed for mild pain., Disp: , Rfl:   • amLODIPine (NORVASC) 5 mg tablet, Take 1 tablet (5 mg total) by mouth every morning, Disp: 90 tablet, Rfl: 1  •  atorvastatin (LIPITOR) 10 mg tablet, Take 1 tablet (10 mg total) by mouth daily, Disp: 90 tablet, Rfl: 1  •  carvedilol (COREG) 12.5 mg tablet, Take 1 tablet (12.5 mg total) by mouth 2 (two) times a day with meals, Disp: 180 tablet, Rfl: 1  •  carvedilol (COREG) 3.125 mg tablet, Take 3.125 mg by mouth 2 (two) times a day with meals, Disp: , Rfl:   •  famotidine (PEPCID) 20 mg tablet, Take 1 tablet (20 mg total) by mouth 2 (two) times a day, Disp: 180 tablet, Rfl: 0  •  levothyroxine 75 mcg tablet, Take 1 tablet (75 mcg total) by mouth in the morning, Disp: 90 tablet, Rfl: 1  •  lisinopril (ZESTRIL) 40 mg tablet, TAKE ONE TABLET BY MOUTH EVERY DAY, Disp: 90 tablet, Rfl: 1  •  polyethylene glycol (MIRALAX) 17 g packet, Take 17 g by mouth every other day, Disp: , Rfl:   •  spironolactone (ALDACTONE) 25 mg tablet, , Disp: , Rfl:     Current Allergies     Allergies as of 08/21/2023 - Reviewed 08/21/2023   Allergen Reaction Noted   • Biaxin [clarithromycin] GI Intolerance 08/17/2016   • Codeine Other (See Comments) 08/17/2016   • Latex Hives 08/17/2016   • Levaquin [levofloxacin in d5w] GI Intolerance 08/17/2016   • Morphine and related GI Intolerance 08/17/2016   • Nsaids GI Intolerance 08/17/2016   • Oxycodone Other (See Comments) 08/17/2016   • Percocet [oxycodone-acetaminophen] Other (See Comments) 08/17/2016   • Ultram [tramadol hcl] Other (See Comments) 08/17/2016            The following portions of the patient's history were reviewed and updated as appropriate: allergies, current medications, past family history, past medical history, past social history, past surgical history and problem list.     Past Medical History:   Diagnosis Date   • Arthritis     osteo   • Autoimmune hepatitis ([address])    • Back pain with radiation     to bilat legs   • De Santiago esophagus    • De Santiago's esophagus    • Chronic GERD • Chronic uveitis     bilateral   • CPAP (continuous positive airway pressure) dependence    • Diabetes mellitus (HCC)     diet controlled   • Disease of thyroid gland     hypo   • Diverticulitis    • Fibromyalgia syndrome    • Fibromyalgia, primary    • Hiatal hernia    • Hx of colonoscopy 2016    EGD done simultaneously   • Hyperlipidemia    • Hypertension    • Infectious viral hepatitis    • PONV (postoperative nausea and vomiting)    • Sinus congestion    • Sleep apnea     CPAP rx, does not use mask       Past Surgical History:   Procedure Laterality Date   • BILATERAL SALPINGOOPHORECTOMY  1996   • BREAST BIOPSY Right    • CARPAL TUNNEL RELEASE Right 2004   • CHOLECYSTECTOMY     • COLONOSCOPY     • FRACTURE SURGERY Right     ORIF femur, emily in place   • [address] [place] [address]   • JOINT REPLACEMENT Right 2004    knee   • JOINT REPLACEMENT Left 2007    knee   • JOINT REPLACEMENT Right 2011    hip   • JOINT REPLACEMENT Left 2017    knee   • SC XCAPSL CTRC RMVL INSJ IO LENS PROSTH W/O ECP Right 08/22/2016    Procedure: EXTRACTION EXTRACAPSULAR CATARACT PHACO INTRAOCULAR LENS (IOL); Surgeon: Marian Hilliard MD;  Location: [facility] MAIN OR;  Service: Ophthalmology   • SC XCAPSL CTRC RMVL INSJ IO LENS PROSTH W/O ECP Left 11/26/2018    Procedure: EXTRACTION EXTRACAPSULAR CATARACT PHACO INTRAOCULAR LENS (IOL); Surgeon: Marian Hilliard MD;  Location: [facility] MAIN OR;  Service: Ophthalmology   • UPPER GASTROINTESTINAL ENDOSCOPY         Family History   Problem Relation Age of Onset   • Heart disease Mother    • Diverticulitis Mother    • Stroke Father    • COPD Sister    • Diabetes Brother    • Cancer Maternal Grandmother         breast   • Pancreatic cancer Cousin          Medications have been verified. Objective   /96   Pulse 71   Temp 99 °F (37.2 °C)   Resp 16   Ht 4' 10" (1.473 m)   Wt 69.4 kg (153 lb)   SpO2 97%   BMI 31.98 kg/m²   No LMP recorded. Patient has had a hysterectomy.        Physical Exam     Physical Exam  Vitals and nursing note reviewed. Constitutional:       General: She is not in acute distress. Appearance: Normal appearance. She is not ill-appearing. Pulmonary:      Effort: Pulmonary effort is normal.   Musculoskeletal:      Right wrist: Swelling present. Right hand: Swelling, tenderness and bony tenderness present. No deformity. Decreased range of motion. Decreased strength. Normal sensation. There is no disruption of two-point discrimination. Normal capillary refill. Neurological:      Mental Status: She is alert and oriented to person, place, and time. Psychiatric:         Mood and Affect: Mood normal.         Behavior: Behavior normal.     Orthopedic injury treatment    Date/Time: 8/21/2023 9:10 AM    Performed by: Pauline Kanh PA-C  Authorized by: Pauline Kahn PA-C    Patient Location:  [facility] Protocol:  Consent: Verbal consent obtained.   Risks and benefits: risks, benefits and alternatives were discussed  Consent given by: patient  Patient understanding: patient states understanding of the procedure being performed      Injury location:  Hand  Location details:  Right hand  Injury type:  Fracture  Distal perfusion: normal    Neurological function: normal    Range of motion: reduced    Splint type:  Ulnar gutter  Supplies used:  Cotton padding, elastic bandage and Ortho-Glass  Neurovascular status: Neurovascularly intact    Distal perfusion: normal    Neurological function: normal    Range of motion: unchanged    Patient tolerance:  Patient tolerated the procedure well with no immediate complications

## 2023-08-22 ENCOUNTER — OFFICE VISIT (OUTPATIENT)
Dept: OBGYN CLINIC | Facility: CLINIC | Age: 83
End: 2023-08-22
Payer: MEDICARE

## 2023-08-22 VITALS
SYSTOLIC BLOOD PRESSURE: 141 MMHG | WEIGHT: 153 LBS | BODY MASS INDEX: 32.12 KG/M2 | HEIGHT: 58 IN | HEART RATE: 67 BPM | DIASTOLIC BLOOD PRESSURE: 80 MMHG

## 2023-08-22 DIAGNOSIS — S62.346A CLOSED NONDISPLACED FRACTURE OF BASE OF FIFTH METACARPAL BONE OF RIGHT HAND, INITIAL ENCOUNTER: ICD-10-CM

## 2023-08-22 PROBLEM — S62.234A CLOSED NONDISPLACED FRACTURE OF BASE OF FIRST METACARPAL BONE OF RIGHT HAND: Status: ACTIVE | Noted: 2023-08-22

## 2023-08-22 PROCEDURE — 99203 OFFICE O/P NEW LOW 30 MIN: CPT | Performed by: ORTHOPAEDIC SURGERY

## 2023-08-22 NOTE — PROGRESS NOTES
Assessment/Plan:  1. Closed nondisplaced fracture of base of fifth metacarpal bone of right hand, initial encounter  Ambulatory Referral to Orthopedic Surgery    Brace          • Physical examination performed, diagnostic imaging reviewed, and thorough subjective history obtained at today's visit  • Treatment options were discussed which included nonoperative and operative treatment. • Nonoperative treatment includes splinting, therapy, use of tylenol or NSAIDs. Risk, benefits, and realistic expectations following nonoperative treatment were discussed. • The patient deferred follow-up X-rays today and will get new images at her next visit. • The patient was given an opportunity to ask questions. Questions were answered to their satisfaction. • Through shared decision making, the patient decided to move forward with a splint immobilization. • Follow up in 2 weeks for clinical and radiographic evaluation of the right hand to include 3 views out of the splint      cc: My right hand hurts    Subjective: Jin Villarreal is a right hand dominant 79 y.o. female who presents with a closed nondisplaced fracture of the proximal fifth metacarpal of the right hand. She tripped and fell on her hand when missing a step approximately 2 weeks ago. She [facility] in a [facility] and diagnosed with the fracture. She reports she has been wearing a left-sided wrist brace that she has previously used for carpal tunnel syndrome. Review of Systems   Constitutional: Negative for chills and fever. HENT: Negative for ear pain and sore throat. Eyes: Negative for pain and visual disturbance. Respiratory: Negative for cough and shortness of breath. Cardiovascular: Negative for chest pain and palpitations. Gastrointestinal: Negative for abdominal pain and vomiting. Genitourinary: Negative for dysuria and hematuria. Musculoskeletal: Positive for arthralgias and joint swelling. Negative for back pain.    Skin: Negative for color change and rash. Neurological: Negative for seizures and syncope. All other systems reviewed and are negative. Past Medical History:   Diagnosis Date   • Arthritis     osteo   • Autoimmune hepatitis ([address])    • Back pain with radiation     to bilat legs   • De Santiago esophagus    • De Santiago's esophagus    • Chronic GERD    • Chronic uveitis     bilateral   • CPAP (continuous positive airway pressure) dependence    • Diabetes mellitus (HCC)     diet controlled   • Disease of thyroid gland     hypo   • Diverticulitis    • Fibromyalgia syndrome    • Fibromyalgia, primary    • Hiatal hernia    • Hx of colonoscopy 2016    EGD done simultaneously   • Hyperlipidemia    • Hypertension    • Infectious viral hepatitis    • PONV (postoperative nausea and vomiting)    • Sinus congestion    • Sleep apnea     CPAP rx, does not use mask       Past Surgical History:   Procedure Laterality Date   • BILATERAL SALPINGOOPHORECTOMY  1996   • BREAST BIOPSY Right    • CARPAL TUNNEL RELEASE Right 2004   • CHOLECYSTECTOMY     • COLONOSCOPY     • FRACTURE SURGERY Right     ORIF femur, emily in place   • HYSTERECTOMY  1974    GOPAL   • JOINT REPLACEMENT Right 2004    knee   • JOINT REPLACEMENT Left 2007    knee   • JOINT REPLACEMENT Right 2011    hip   • JOINT REPLACEMENT Left 2017    knee   • CT XCAPSL CTRC RMVL INSJ IO LENS PROSTH W/O ECP Right 08/22/2016    Procedure: EXTRACTION EXTRACAPSULAR CATARACT PHACO INTRAOCULAR LENS (IOL); Surgeon: Ghazal Baxter MD;  Location: [facility] MAIN OR;  Service: Ophthalmology   • CT XCAPSL CTRC RMVL INSJ IO LENS PROSTH W/O ECP Left 11/26/2018    Procedure: EXTRACTION EXTRACAPSULAR CATARACT PHACO INTRAOCULAR LENS (IOL);   Surgeon: Ghazal Baxter MD;  Location: [facility] MAIN OR;  Service: Ophthalmology   • UPPER GASTROINTESTINAL ENDOSCOPY         Family History   Problem Relation Age of Onset   • Heart disease Mother    • Diverticulitis Mother    • Stroke Father    • COPD Sister    • Liver disease Sister    • Diabetes Brother    • Cancer Maternal Grandmother         breast   • Pancreatic cancer Cousin        Social History     Occupational History   • Not on file   Tobacco Use   • Smoking status: Never     Passive exposure: Past   • Smokeless tobacco: Never   Vaping Use   • Vaping Use: Never used   Substance and Sexual Activity   • Alcohol use: No   • Drug use: Never   • Sexual activity: Not on file         Current Outpatient Medications:   •  acetaminophen (TYLENOL) 325 mg tablet, Take 650 mg by mouth every 6 (six) hours as needed for mild pain., Disp: , Rfl:   •  amLODIPine (NORVASC) 5 mg tablet, Take 1 tablet (5 mg total) by mouth every morning, Disp: 90 tablet, Rfl: 1  •  atorvastatin (LIPITOR) 10 mg tablet, Take 1 tablet (10 mg total) by mouth daily, Disp: 90 tablet, Rfl: 1  •  carvedilol (COREG) 12.5 mg tablet, Take 1 tablet (12.5 mg total) by mouth 2 (two) times a day with meals, Disp: 180 tablet, Rfl: 1  •  carvedilol (COREG) 3.125 mg tablet, Take 3.125 mg by mouth 2 (two) times a day with meals, Disp: , Rfl:   •  famotidine (PEPCID) 20 mg tablet, Take 1 tablet (20 mg total) by mouth 2 (two) times a day, Disp: 180 tablet, Rfl: 0  •  levothyroxine 75 mcg tablet, Take 1 tablet (75 mcg total) by mouth in the morning, Disp: 90 tablet, Rfl: 1  •  lisinopril (ZESTRIL) 40 mg tablet, TAKE ONE TABLET BY MOUTH EVERY DAY, Disp: 90 tablet, Rfl: 1  •  polyethylene glycol (MIRALAX) 17 g packet, Take 17 g by mouth every other day, Disp: , Rfl:   •  spironolactone (ALDACTONE) 25 mg tablet, , Disp: , Rfl:     Allergies   Allergen Reactions   • Biaxin [Clarithromycin] GI Intolerance   • Codeine Other (See Comments)     dizzy   • Latex Hives     Blisters and hives local reaction   • Levaquin [Levofloxacin In D5w] GI Intolerance   • Morphine And Related GI Intolerance   • Nsaids GI Intolerance   • Oxycodone Other (See Comments)     dizzy   • Percocet [Oxycodone-Acetaminophen] Other (See Comments)     dizzy   • Ultram [Tramadol Hcl] Other (See Comments)     dizzy       Objective:  Vitals:    08/22/23 0807   BP: 141/80   Pulse: 67       The patient was awake, alert, and oriented to person, place, and time. No acute distress. Normocephalic. EOMI. Mucous membranes moist.  Normal respiratory effort. Examination of the affected extremity was compared to the unaffected extremity. Skin was intact. There was mild swelling and resolving ecchymosis. No deformity. Hand and fingers were warm and well-perfused. Capillary refill was brisk. Full active range of motion of the elbows, forearms, wrists, and fingers. There is tenderness to palpation at the base of the fifth metacarpal.  No rotational deformity of the fingers. Imaging/Diagnostic Studies:    I reviewed imaging studies dated 8/21/2023 which included 3 views. These images studies demonstrated minimally displaced fracture at the base of the fifth metacarpal.        This document was created using speech voice recognition software. Grammatical errors, random word insertions, pronoun errors, and incomplete sentences are an occasional consequence of this system due to software limitations, ambient noise, and hardware issues. Any formal questions or concerns about content, text, or information contained within the body of this dictation should be directly addressed to the provider for clarification.

## 2023-09-05 ENCOUNTER — APPOINTMENT (OUTPATIENT)
Dept: RADIOLOGY | Facility: CLINIC | Age: 83
End: 2023-09-05
Payer: MEDICARE

## 2023-09-05 ENCOUNTER — OFFICE VISIT (OUTPATIENT)
Dept: OBGYN CLINIC | Facility: CLINIC | Age: 83
End: 2023-09-05
Payer: MEDICARE

## 2023-09-05 VITALS
SYSTOLIC BLOOD PRESSURE: 140 MMHG | HEIGHT: 58 IN | WEIGHT: 153 LBS | BODY MASS INDEX: 32.12 KG/M2 | DIASTOLIC BLOOD PRESSURE: 68 MMHG

## 2023-09-05 DIAGNOSIS — S62.346A CLOSED NONDISPLACED FRACTURE OF BASE OF FIFTH METACARPAL BONE OF RIGHT HAND, INITIAL ENCOUNTER: ICD-10-CM

## 2023-09-05 DIAGNOSIS — S62.346D CLOSED NONDISPLACED FRACTURE OF BASE OF FIFTH METACARPAL BONE OF RIGHT HAND WITH ROUTINE HEALING, SUBSEQUENT ENCOUNTER: Primary | ICD-10-CM

## 2023-09-05 PROCEDURE — 99214 OFFICE O/P EST MOD 30 MIN: CPT | Performed by: ORTHOPAEDIC SURGERY

## 2023-09-05 PROCEDURE — 73130 X-RAY EXAM OF HAND: CPT

## 2023-09-05 NOTE — PROGRESS NOTES
Assessment/Plan:  1. Closed nondisplaced fracture of base of fifth metacarpal bone of right hand with routine healing, subsequent encounter  XR hand 3+ vw right        Scribe Attestation    I,:  Jonna Valdovinos am acting as a scribe while in the presence of the attending physician.:       I,:  Omar May MD personally performed the services described in this documentation    as scribed in my presence.:         · Kyle Nice is doing well with her recovery. · At this time, she may wean from her splint. I recommended that she continue using this when in public. · She understands that intermittent swelling and aching are to be expected while weaning from her splint  · I provided her with a referral for formal hand therapy today  · I explained the nodule about her ring finger is likely a cyst and does not require intervention. · All questions and concerns addressed today  · Follow-up in 4 weeks with repeat x-ray    Subjective: Follow-up evaluation 2 weeks status post closed treatment for fifth metacarpal fracture    Patient ID: Last Garza is a 79 y.o. female who returns today for follow-up evaluation 2-week status post closed treatment fifth metacarpal fracture. She reports that she has been doing well. She has been using her splint as instructed. She does note some swelling about the ulnar aspect of her hand and resolving ecchymosis in this area as well. She does report some discomfort about the base of her small finger at times, but no pain at the fracture site. She has developed a mobile bump near the base of her ring finger and is curious about this as well. She denies any new injury or trauma. Review of Systems   Constitutional: Positive for activity change. Negative for chills, fever and unexpected weight change. HENT: Negative for hearing loss, nosebleeds and sore throat. Eyes: Negative for pain, redness and visual disturbance.    Respiratory: Negative for cough, shortness of breath and wheezing. Cardiovascular: Negative for chest pain, palpitations and leg swelling. Gastrointestinal: Negative for abdominal pain, nausea and vomiting. Endocrine: Negative for polydipsia and polyuria. Genitourinary: Negative for dysuria and hematuria. Musculoskeletal: Positive for arthralgias and myalgias. Negative for joint swelling. See HPI   Skin: Negative for rash and wound. Neurological: Negative for dizziness, numbness and headaches. Psychiatric/Behavioral: Negative for decreased concentration and suicidal ideas. The patient is not nervous/anxious. Past Medical History:   Diagnosis Date   • Arthritis     osteo   • Autoimmune hepatitis ([address])    • Back pain with radiation     to bilat legs   • De Santiago esophagus    • De Santiago's esophagus    • Chronic GERD    • Chronic uveitis     bilateral   • CPAP (continuous positive airway pressure) dependence    • Diabetes mellitus (HCC)     diet controlled   • Disease of thyroid gland     hypo   • Diverticulitis    • Fibromyalgia syndrome    • Fibromyalgia, primary    • Hiatal hernia    • Hx of colonoscopy 2016    EGD done simultaneously   • Hyperlipidemia    • Hypertension    • Infectious viral hepatitis    • PONV (postoperative nausea and vomiting)    • Sinus congestion    • Sleep apnea     CPAP rx, does not use mask       Past Surgical History:   Procedure Laterality Date   • BILATERAL SALPINGOOPHORECTOMY  1996   • BREAST BIOPSY Right    • CARPAL TUNNEL RELEASE Right 2004   • CHOLECYSTECTOMY     • COLONOSCOPY     • FRACTURE SURGERY Right     ORIF femur, emily in place   • HYSTERECTOMY  1974    GOPAL   • JOINT REPLACEMENT Right 2004    knee   • JOINT REPLACEMENT Left 2007    knee   • JOINT REPLACEMENT Right 2011    hip   • JOINT REPLACEMENT Left 2017    knee   • LA XCAPSL CTRC RMVL INSJ IO LENS PROSTH W/O ECP Right 08/22/2016    Procedure: EXTRACTION EXTRACAPSULAR CATARACT PHACO INTRAOCULAR LENS (IOL);   Surgeon: Dax Márquez MD;  Location: [facility] MAIN OR;  Service: Ophthalmology   • [place] XCAPSL CTRC RMVL INSJ IO LENS PROSTH W/O ECP Left 11/26/2018    Procedure: EXTRACTION EXTRACAPSULAR CATARACT PHACO INTRAOCULAR LENS (IOL);   Surgeon: Lia Espitia MD;  Location: [facility] MAIN OR;  Service: Ophthalmology   • UPPER GASTROINTESTINAL ENDOSCOPY         Family History   Problem Relation Age of Onset   • Heart disease Mother    • Diverticulitis Mother    • Stroke Father    • COPD Sister    • Liver disease Sister    • Diabetes Brother    • Cancer Maternal Grandmother         breast   • Pancreatic cancer Cousin        Social History     Occupational History   • Not on file   Tobacco Use   • Smoking status: Never     Passive exposure: Past   • Smokeless tobacco: Never   Vaping Use   • Vaping Use: Never used   Substance and Sexual Activity   • Alcohol use: No   • Drug use: Never   • Sexual activity: Not on file         Current Outpatient Medications:   •  acetaminophen (TYLENOL) 325 mg tablet, Take 650 mg by mouth every 6 (six) hours as needed for mild pain., Disp: , Rfl:   •  amLODIPine (NORVASC) 5 mg tablet, Take 1 tablet (5 mg total) by mouth every morning, Disp: 90 tablet, Rfl: 1  •  atorvastatin (LIPITOR) 10 mg tablet, Take 1 tablet (10 mg total) by mouth daily, Disp: 90 tablet, Rfl: 1  •  carvedilol (COREG) 12.5 mg tablet, Take 1 tablet (12.5 mg total) by mouth 2 (two) times a day with meals, Disp: 180 tablet, Rfl: 1  •  carvedilol (COREG) 3.125 mg tablet, Take 3.125 mg by mouth 2 (two) times a day with meals, Disp: , Rfl:   •  famotidine (PEPCID) 20 mg tablet, Take 1 tablet (20 mg total) by mouth 2 (two) times a day, Disp: 180 tablet, Rfl: 0  •  levothyroxine 75 mcg tablet, Take 1 tablet (75 mcg total) by mouth in the morning, Disp: 90 tablet, Rfl: 1  •  lisinopril (ZESTRIL) 40 mg tablet, TAKE ONE TABLET BY MOUTH EVERY DAY, Disp: 90 tablet, Rfl: 1  •  polyethylene glycol (MIRALAX) 17 g packet, Take 17 g by mouth every other day, Disp: , Rfl:   •  spironolactone (ALDACTONE) 25 mg tablet, , Disp: , Rfl:     Allergies   Allergen Reactions   • Biaxin [Clarithromycin] GI Intolerance   • Codeine Other (See Comments)     dizzy   • Latex Hives     Blisters and hives local reaction   • Levaquin [Levofloxacin In D5w] GI Intolerance   • Morphine And Related GI Intolerance   • Nsaids GI Intolerance   • Oxycodone Other (See Comments)     dizzy   • Percocet [Oxycodone-Acetaminophen] Other (See Comments)     dizzy   • Ultram [Tramadol Hcl] Other (See Comments)     dizzy       Objective: There were no vitals filed for this visit. Body mass index is 31.98 kg/m². Right Hand Exam     Tenderness   The patient is experiencing no tenderness. Other   Erythema: absent  Scars: absent  Sensation: normal  Pulse: present    Comments:  Mild swelling at fracture site  No pain at fracture site  Small palpable cyst proximal phalanx ring finger  Able to form a composite fist  No rotational deformity of fingers            Physical Exam  Vitals and nursing note reviewed. Constitutional:       Appearance: Normal appearance. She is well-developed. HENT:      Head: Normocephalic and atraumatic. Right Ear: External ear normal.      Left Ear: External ear normal.   Eyes:      General: No scleral icterus. Extraocular Movements: Extraocular movements intact. Conjunctiva/sclera: Conjunctivae normal.   Cardiovascular:      Rate and Rhythm: Normal rate. Pulmonary:      Effort: Pulmonary effort is normal. No respiratory distress. Musculoskeletal:      Cervical back: Normal range of motion and neck supple. Comments: See Ortho exam   Skin:     General: Skin is warm and dry. Neurological:      General: No focal deficit present. Mental Status: She is alert and oriented to person, place, and time. Psychiatric:         Behavior: Behavior normal.         I have personally reviewed pertinent films in PACS.     X-ray of the right hand obtained on 9/5/2023 reviewed demonstrating a stable appearing fracture at the base of the fifth metacarpal.  There is no interval displacement. There is no new fracture, dislocation, lytic or blastic lesion. This document was created using speech voice recognition software. Grammatical errors, random word insertions, pronoun errors, and incomplete sentences are an occasional consequence of this system due to software limitations, ambient noise, and hardware issues. Any formal questions or concerns about content, text, or information contained within the body of this dictation should be directly addressed to the provider for clarification.

## 2023-09-13 ENCOUNTER — APPOINTMENT (OUTPATIENT)
Facility: CLINIC | Age: 83
End: 2023-09-13
Payer: MEDICARE

## 2023-09-13 ENCOUNTER — TELEPHONE (OUTPATIENT)
Age: 83
End: 2023-09-13

## 2023-09-13 DIAGNOSIS — R26.89 BALANCE PROBLEM: Primary | ICD-10-CM

## 2023-09-13 NOTE — TELEPHONE ENCOUNTER
Pt called requesting a new referral for PT. Pt said hers expires on 9/16, but her appt is not until 9/27. Pt said its for balance and strengthening     Please advise    Pt would like to be contacted once referral has been updated.

## 2023-09-17 NOTE — PROGRESS NOTES
OT Evaluation     Today's date: 2023  Patient name: Linette Gutiérrez  : 1940  MRN: [id number]  Referring provider: Chinyere Chaidez DO  Dx:   Encounter Diagnosis     ICD-10-CM    1. Closed nondisplaced fracture of base of fifth metacarpal bone of right hand with routine healing, subsequent encounter  S62.346D Ambulatory Referral to PT/OT Hand Therapy                     Assessment  Assessment details: Patient is a 79 y.o. female who presents for OT IE and treatment for Closed nondisplaced fracture of base of fifth metacarpal bone of right hand. Patient reports she tripped and fell on her hand when missing a step approximately 4 weeks ago on 2023. She was initially seen in a [facility] and diagnosed with the fracture. She reports she has stopped wearing her splint as of last week. Patient referred by Dr. Trevon Castillo to initiate treatment including hand therapy. Impairments: activity intolerance, impaired physical strength and lacks appropriate home exercise program    Symptom irritability: highUnderstanding of Dx/Px/POC: good   Prognosis: good    Goals  Short Term Goals by 2 - 4 weeks:    1. Establish HEP to increase performance with daily activities. 2. Patient will increase  strength by at least 4 lbs to assist in completing ADLs. 3. Patient will increase pinch strength by at least 2 lbs to assist in completing ADLs. 4. Patient will increase R hand 5th digit ROM by at least 5 degrees to complete ADLs such as toileting    5. Patient will decrease pain rate of UE by at least 2 points per the VAS scale. Long Term Goals by discharge:    1. Establish final home exercise program to enhance maximal functional level with ADLs. 2.  Achieve functional active range of motion of R hand 5th digit ROM by at least 10 degrees for full return to household chores.                              3. Patient will increase R hand  strength by at least 8 lbs for full return to high level ADLs.         Plan  Plan details: Focus on HEP, A/AA/PROM, strengthening, TA, TE, manual therapy, joint preservation techniques, edema management, modalities PRN to improve ability to complete daily activites with ease. POC 9/18/2023 - 10/30/2023. Patient would benefit from: OT eval and skilled occupational therapy  Planned modality interventions: thermotherapy: hydrocollator packs, electrical stimulation/[place] stimulation, ultrasound and manual electrical stimulation  Planned therapy interventions: IASTM, joint mobilization, kinesiology taping, manual therapy, nerve gliding, neuromuscular re-education, orthotic management and training, orthotic fitting/training, patient education, sensory integrative techniques, strengthening, stretching, therapeutic activities, therapeutic exercise, IADL retraining, home exercise program, graded exercise, graded activity, functional ROM exercises, flexibility, fine motor coordination training, coordination, ADL training, activity modification and ADL retraining  Frequency: 2x week  Duration in visits: 6  Duration in weeks: 12  Plan of Care beginning date: 9/18/2023  Plan of Care expiration date: 10/30/2023  Treatment plan discussed with: patient        Subjective Evaluation    History of Present Illness  Mechanism of injury: Patient is a 79 y.o. female who presents for OT IE and treatment for Closed nondisplaced fracture of base of fifth metacarpal bone of right hand. Patient reports she tripped and fell on her hand when missing a step approximately 4 weeks ago on 8/21/2023. She was initially seen in a [facility] and diagnosed with the fracture. She reports she has stopped wearing her splint as of last week. Patient referred by Dr. Nino Moore to initiate treatment including hand therapy.     Quality of life: good    Patient Goals  Patient goals for therapy: increased strength, independence with ADLs/IADLs, increased motion and decreased pain    Pain  Current pain rating: 3  At best pain rating: 3  At worst pain ratin  Quality: dull ache  Relieving factors: medications  Exacerbated by: meal preparation, twisting jars, picking up pots/pans,  Progression: improved    Social Support  Stairs in house: no   Lives in: [facility] house    Employment status: not working  Hand dominance: right      Diagnostic Tests  Abnormal x-ray: minimal fx at base of 5th metacarpal of R hand.   Treatments  No previous or current treatments        Objective     Active Range of Motion     Left Wrist   Wrist flexion: 48 degrees   Wrist extension: 65 degrees     Right Wrist   Wrist flexion: 50 degrees   Wrist extension: 52 degrees     Left Digits    Flexion   Little     MCP: 74    PIP: 84    DIP: 60    Right Digits   Flexion   Little     MCP: 62    PIP: 84    DIP: 48    Strength/Myotome Testing     Left Wrist/Hand      (2nd hand position)     Trial 1: 25    Thumb Strength  Key/Lateral Pinch     Trial 1: 6  Tip/Two-Point Pinch     Trial 1: 4  Palmar/Three-Point Pinch     Trial 1: 4    Right Wrist/Hand      (2nd hand position)     Trial 1: 20    Thumb Strength   Key/Lateral Pinch     Trial 1: 8  Tip/Two-Point Pinch     Trial 1: 6  Palmar/Three-Point Pinch     Trial 1: 6             Precautions: Universal      Auth Tracker  Auth Status Total   Visits  Expiration date Co-Insurance   pending                                  Visit Tracker  Date   IE                                                                                    PMHx:   has a past medical history of Arthritis, Autoimmune hepatitis ([address]), Back pain with radiation, De Santiago esophagus, De Santiago's esophagus, Chronic GERD, Chronic uveitis, CPAP (continuous positive airway pressure) dependence, Diabetes mellitus ([address]), Disease of thyroid gland, Diverticulitis, Fibromyalgia syndrome, Fibromyalgia, primary, Hiatal hernia, colonoscopy (), Hyperlipidemia, Hypertension, Infectious viral hepatitis, PONV (postoperative nausea and vomiting), Sinus congestion, and Sleep apnea. Precautions: universal    Manuals HEP 9/18/2023   STM/PROM                    Ther Ex     Education on HEP and dx  x10min   Tendon Glides X 2 x10    Sponge Squeezing X Blue 2 x10   PIP/DIP blocking                              Ther Act                     Modalities     MHP  5 min           Assessment:   Patient tolerated session well. Patient demonstrates decreased 5th digit ROM as well as decreased strength upon assessment today. Patient session focused on patient education on anatomy and physiology concerning current dx, techniques for decreasing deficits through HEP, and appropriate use of modalities. Patient educated on HEP to include tendon glides, gentle strengthening exercises and joint protection techniques with verbal instructions and handouts for patient reference. Patient educated on treatment plan at this time. Patient benefiting from skilled hand therapy OT to reduce deficits to improve independence with daily activities      Plan:   Focus on HEP, A/AA/PROM, strengthening, TA, TE, manual therapy, joint preservation techniques, edema management, modalities PRN to improve ability to complete daily activites with ease. POC 9/18/2023 - 10/30/2023.

## 2023-09-18 ENCOUNTER — EVALUATION (OUTPATIENT)
Dept: OCCUPATIONAL THERAPY | Facility: CLINIC | Age: 83
End: 2023-09-18
Payer: MEDICARE

## 2023-09-18 DIAGNOSIS — S62.346D CLOSED NONDISPLACED FRACTURE OF BASE OF FIFTH METACARPAL BONE OF RIGHT HAND WITH ROUTINE HEALING, SUBSEQUENT ENCOUNTER: Primary | ICD-10-CM

## 2023-09-18 PROCEDURE — 97166 OT EVAL MOD COMPLEX 45 MIN: CPT

## 2023-09-19 NOTE — PROGRESS NOTES
Daily Note     Today's date: 2023  Patient name: Jacinta Garcia  : 1940  MRN: [id number]  Referring provider: Dharmesh Paul DO  Dx:   Encounter Diagnosis     ICD-10-CM    1. Closed nondisplaced fracture of base of fifth metacarpal bone of right hand with routine healing, subsequent encounter  S62.346D                    Subjective: Patient reports to experience less pain and discomfort at base of 5th digit. Patient reports improved ability to form full fist however, tight fist formation is still difficult. Patient reports she is attempted to be mindful of restrictions and avoiding heavy resistance/ lifting activities. Objective: See treatment diary below         Precautions: Universal    Fall occurred on : currently ~6 weeks post fx. NO STRENGTH         Visit Tracker: NO AUTH REQ: Karen Johnson  Date   IE                                                                                    PMHx:   has a past medical history of Arthritis, Autoimmune hepatitis ([address]), Back pain with radiation, De Santiago esophagus, De Santiago's esophagus, Chronic GERD, Chronic uveitis, CPAP (continuous positive airway pressure) dependence, Diabetes mellitus ([address]), Disease of thyroid gland, Diverticulitis, Fibromyalgia syndrome, Fibromyalgia, primary, Hiatal hernia, colonoscopy (), Hyperlipidemia, Hypertension, Infectious viral hepatitis, PONV (postoperative nausea and vomiting), Sinus congestion, and Sleep apnea. Precautions: universal    Manuals HEP 2023     Edema mobilization   x10 min   PROM  Gentle              Ther Ex     Education on HEP and dx  x10min   Tendon Glides X 2 x10    AROM wrist Flex/ext x x30   AROM wrist RD/UD x x30    AROM Forearm rotation  x x30   Digit add/abduction   x20                  Ther Act      Towel Crawl   x10   Wrist maze   x5         Modalities     MHP  5 min           Assessment:   Patient presents to therapy with continued presence of slight swelling to right hand.  Patient continues to demonstrate range restrictions of wrist. Manual treatment performed at beginning of session with focus on edema mobilization to reduce swelling. Gentle joint passive range of motion performed following edema mobilization. TE/TA performed following manual treatment consisting of active range of motion exercises of digits and wrist.  Patient tolerated session well. Patient tolerated all TE/TA with no compaints. Patient progressing well towards goals. Patient benefiting from skilled hand therapy OT to reduce deficits to improve independence with daily activities. Plan:   Focus on HEP, A/AA/PROM, strengthening, TA, TE, manual therapy, joint preservation techniques, edema management, modalities PRN to improve ability to complete daily activites with ease. POC 9/18/2023 - 10/30/2023.

## 2023-09-20 ENCOUNTER — OFFICE VISIT (OUTPATIENT)
Dept: OCCUPATIONAL THERAPY | Facility: CLINIC | Age: 83
End: 2023-09-20
Payer: MEDICARE

## 2023-09-20 DIAGNOSIS — S62.346D CLOSED NONDISPLACED FRACTURE OF BASE OF FIFTH METACARPAL BONE OF RIGHT HAND WITH ROUTINE HEALING, SUBSEQUENT ENCOUNTER: Primary | ICD-10-CM

## 2023-09-20 PROCEDURE — 97110 THERAPEUTIC EXERCISES: CPT

## 2023-09-20 PROCEDURE — 97530 THERAPEUTIC ACTIVITIES: CPT

## 2023-09-20 PROCEDURE — 97140 MANUAL THERAPY 1/> REGIONS: CPT

## 2023-09-25 ENCOUNTER — OFFICE VISIT (OUTPATIENT)
Dept: OCCUPATIONAL THERAPY | Facility: CLINIC | Age: 83
End: 2023-09-25
Payer: MEDICARE

## 2023-09-25 ENCOUNTER — TELEPHONE (OUTPATIENT)
Age: 83
End: 2023-09-25

## 2023-09-25 DIAGNOSIS — S62.346D CLOSED NONDISPLACED FRACTURE OF BASE OF FIFTH METACARPAL BONE OF RIGHT HAND WITH ROUTINE HEALING, SUBSEQUENT ENCOUNTER: Primary | ICD-10-CM

## 2023-09-25 PROCEDURE — 97530 THERAPEUTIC ACTIVITIES: CPT

## 2023-09-25 PROCEDURE — 97110 THERAPEUTIC EXERCISES: CPT

## 2023-09-25 PROCEDURE — 97140 MANUAL THERAPY 1/> REGIONS: CPT

## 2023-09-25 NOTE — TELEPHONE ENCOUNTER
The patient daughter call requesting the medication gabapentin (NEURONTIN) 100 mg capsule. Her mother is in pain. They have an appointment on 10/10/2023 but need the medication before that time. The patient needs blood work order before her appointment time.

## 2023-09-25 NOTE — PROGRESS NOTES
Daily Note     Today's date: 2023  Patient name: Geoffrey Arzola  : 1940  MRN: [id number]  Referring provider: Jenniffer Jackson DO  Dx:   Encounter Diagnosis     ICD-10-CM    1. Closed nondisplaced fracture of base of fifth metacarpal bone of right hand with routine healing, subsequent encounter  S62.346D                    Subjective: Patient reports to experience less pain and discomfort at base of 5th digit. Patient reports improved ability to perform activities she wasn't able to before but finger is feeling stiff this morning. Objective: See treatment diary below       Precautions: Universal    Fall occurred on : currently 7 weeks post fx. NO STRENGTH     Visit Tracker: NO AUTH REQ: [email or web address]  Date   IE                                                                                   PMHx:   has a past medical history of Arthritis, Autoimmune hepatitis ([address]), Back pain with radiation, De Santiago esophagus, De Santiago's esophagus, Chronic GERD, Chronic uveitis, CPAP (continuous positive airway pressure) dependence, Diabetes mellitus ([address]), Disease of thyroid gland, Diverticulitis, Fibromyalgia syndrome, Fibromyalgia, primary, Hiatal hernia, colonoscopy (), Hyperlipidemia, Hypertension, Infectious viral hepatitis, PONV (postoperative nausea and vomiting), Sinus congestion, and Sleep apnea. Precautions: universal    Manuals HEP 2023     Edema mobilization   x10 min   PROM  Gentle              Ther Ex     Education on HEP and dx  x10min   Tendon Glides X 2 x10    AROM wrist Flex/ext x x20   AROM wrist RD/UD x x20    AROM Forearm rotation  x x20   Digit add/abduction   x20                  Ther Act      Towel Crawl   x10   Wrist maze   x5    Dice with alt. digits  Whole jar                       Modalities     MHP  x5 min           Assessment:   Patient presents to therapy with decreased welling to right hand.  Patient continues to demonstrate range restrictions of wrist. Manual treatment performed at beginning of session with focus on edema mobilization to reduce swelling. Gentle joint passive range of motion performed following edema mobilization. TE/TA performed following manual treatment consisting of active range of motion exercises of digits and wrist.  Patient tolerated session well. Patient tolerated all TE/TA with no compaints. Patient progressing well towards goals. Patient benefiting from skilled hand therapy OT to reduce deficits to improve independence with daily activities. Plan:   Focus on HEP, A/AA/PROM, strengthening, TA, TE, manual therapy, joint preservation techniques, edema management, modalities PRN to improve ability to complete daily activites with ease. POC 9/18/2023 - 10/30/2023.

## 2023-09-26 ENCOUNTER — TELEPHONE (OUTPATIENT)
Age: 83
End: 2023-09-26

## 2023-09-26 DIAGNOSIS — M25.519 CHRONIC SHOULDER PAIN, UNSPECIFIED LATERALITY: Primary | ICD-10-CM

## 2023-09-26 DIAGNOSIS — G89.29 CHRONIC SHOULDER PAIN, UNSPECIFIED LATERALITY: Primary | ICD-10-CM

## 2023-09-26 RX ORDER — GABAPENTIN 100 MG/1
100 CAPSULE ORAL
Qty: 90 CAPSULE | Refills: 1 | Status: SHIPPED | OUTPATIENT
Start: 2023-09-26

## 2023-09-26 NOTE — TELEPHONE ENCOUNTER
9/26/2023 2:09 PM reviewed patient's chart. Andie Whyte is listed on her Rosa's communication consent. Will restart her gabapentin 100 for shoulder pain.     New prescription sent      Message complete  Sonny Rosas DO

## 2023-09-26 NOTE — TELEPHONE ENCOUNTER
Patient's daughter called back to speak with Leti Freed however, she is not on patient's medical communication consent. Attempted to reach office clinical staff, no one connected with me, patient's daughter actually hung up before I could get in contact with the office.

## 2023-09-26 NOTE — TELEPHONE ENCOUNTER
Pt calling. She was previously prescribed for gabapentin 100mg by [facility] for her chronic right shoulder pain. She states that this medication helps her a lot with the pain and with sleeping since she takes it at bedtime. Medication is not on med record since it was prescribed in a different hospital system, however, pt only has a few pills left. She was wondering if Dr. Norma Altman would be willing to refill it? She has a f/u appt on 10/20/2023. Also, she wants to know if she should get her pending lab work done prior to appt. Please call Sam Serrano back. Archana Knowles (daughter) is also ok to talk to per pt.

## 2023-09-27 ENCOUNTER — EVALUATION (OUTPATIENT)
Facility: CLINIC | Age: 83
End: 2023-09-27
Payer: MEDICARE

## 2023-09-27 DIAGNOSIS — R26.89 OTHER ABNORMALITIES OF GAIT AND MOBILITY: ICD-10-CM

## 2023-09-27 DIAGNOSIS — R26.89 BALANCE DISORDER: Primary | ICD-10-CM

## 2023-09-27 PROCEDURE — 97163 PT EVAL HIGH COMPLEX 45 MIN: CPT

## 2023-09-27 NOTE — PROGRESS NOTES
Daily Note     Today's date: 2023  Patient name: Shayy Szymanski  : 1940  MRN: [id number]  Referring provider: Anna Vela DO  Dx:   Encounter Diagnosis     ICD-10-CM    1. Closed nondisplaced fracture of base of fifth metacarpal bone of right hand with routine healing, subsequent encounter  S62.346D                    Subjective: Patient reports to experience continued decrease in pain and stiffness however, patient reports stiffness is still present. Objective: See treatment diary below       Precautions: Universal    Fall occurred on : currently 7 weeks post fx. NO STRENGTH     Visit Tracker: NO AUTH REQ: [facility]  Date   IE                                                                                  PMHx:   has a past medical history of Arthritis, Autoimmune hepatitis ([address]), Back pain with radiation, De Santiago esophagus, De Santiago's esophagus, Chronic GERD, Chronic uveitis, CPAP (continuous positive airway pressure) dependence, Diabetes mellitus ([address]), Disease of thyroid gland, Diverticulitis, Fibromyalgia syndrome, Fibromyalgia, primary, Hiatal hernia, colonoscopy (), Hyperlipidemia, Hypertension, Infectious viral hepatitis, PONV (postoperative nausea and vomiting), Sinus congestion, and Sleep apnea. Precautions: universal    Manuals HEP 2023     Edema mobilization   x10 min   PROM  Gentle              Ther Ex     Education on HEP and dx  x10min   Tendon Glides X 2 x10    AROM wrist Flex/ext x x20   AROM wrist RD/UD x x20    AROM Forearm rotation  x x20   Digit add/abduction   x20                  Ther Act      Towel Crawl   x10   Wrist maze   x5    Dice with alt. digits  Whole jar                       Modalities     MHP  x5 min           Assessment:   Patient presents to therapy with continued presence of swelling on ulnar side of hand however, decreases noted. Patient continues to demonstrate range restrictions of wrist and digits.  Manual treatment performed at beginning of session with focus on edema mobilization to reduce swelling. Gentle passive range of motion performed following edema mobilization. TE/TA performed following manual treatment consisting of active range of motion exercises of digits and wrist.  Patient tolerated session well. Patient tolerated all TE/TA with no compaints. Patient progressing well towards goals. Patient benefiting from skilled hand therapy OT to reduce deficits to improve independence with daily activities. Plan:   Focus on HEP, A/AA/PROM, strengthening, TA, TE, manual therapy, joint preservation techniques, edema management, modalities PRN to improve ability to complete daily activites with ease. POC 9/18/2023 - 10/30/2023.

## 2023-09-27 NOTE — PROGRESS NOTES
PT Evaluation          POC expires Auth Status Total   Visits  Start date  Expiration date PT/OT + Visit Limit? Co-Insurance   23 N/A N/A N/A N/A PT, MAJO No and $0 Co-pay                                                  Today's date: 2023  Patient name: Caesar Coronado  : 1940  MRN: [id number]  Referring provider: Brent Montanez DO  Dx:   Encounter Diagnosis     ICD-10-CM    1. Balance disorder  R26.89       2. Other abnormalities of gait and mobility  R26.89             Assessment  Assessment details: Patient is a 79 y.o. Female who presents to skilled outpatient PT with imbalance, dizziness and difficulty with stair negotiation affecting her functional indpendence and ability to participate in her community . Patient's past medical history significant for falls, controlled HTN, and substantial surgical history. She demonstrates deficits in muscular strength, ambulatory safety, dual tasking, dynamic and static balance as per scores on 5x STS, TUG and TUG cog, FGA, and mCTSIB respectively. As per APTA and Rehab Measure cuttoff scores, Pattie Jasmine categorizes at a HIGH risk for falls with respect to the aforementioned outcome measures. Importantly, patient with occurrence of subjective dizziness symptoms following position changes (supine to stand,) ambulation with head turns, backwards walking and activities where vision was occluded. This could indicate a vestibular system dysfunction, plan to perform oculomotor and cervical screen to assess possible vestibular system involvement, as well as screen for orthostatic hypotension. Patient demonstrates high reliance on visual system for dynamic and static balance activities further perpetuating her risk for falls.  Unable to assess gait speed and cardiovascular endurance today due to time constraints, plan to assess in future session to appropriate educate on use of assisted device while ambulating in her community. She will benefit from skilled PT to target balance deficits to improve patient overall function, decrease risk for fall to promote independence and participation in her community. Impairments: Abnormal coordination, Abnormal gait, Abnormal muscle tone, Abnormal or restricted ROM, Activity intolerance, Impaired balance, Impaired physical strength, Lacks appropriate HEP, Poor posture, Poor body mechanics, Pain with function, Safety issue, Weight-bearing intolerance, Abnormal movement, Difficulty understanding, Abnormal muscle firing  Understanding of Dx/Px/POC: Good  Prognosis: Good    Patient verbalized understanding of POC. Please contact me if you have any questions or recommendations. Thank you for the referral and the opportunity to share in [facility].         Plan  Plan details: balance training, strengthening, vestibular training  Patient would benefit from: Skilled PT  Planned modality interventions: Biofeedback, Cryotherapy, TENS, Thermotherapy  Planned therapy interventions: Abdominal trunk stabilization, ADL training, Balance, Balance/WB training, Breathing training, Body mechanics training, Coordination, Functional ROM exercises, Gait training, HEP, Joint Mobilization, Manual Therapy, Felix taping, Motor coordination training, Neuromuscular re-education, Patient education, Postural training, Strengthening, Stretching, Therapeutic activities, Therapeutic exercises, Therapeutic training, Transfer training, Activity modification, Work reintegration  Frequency: 2x/wk  Duration in weeks: 12  Plan of Care beginning date: 9/27/23  Plan of Care expiration date: 12 weeks - 12/20/2023  Treatment plan discussed with: Patient and Family       Goals  Short Term Goals (4 weeks):    - Patient will improve time on TUG by 2.9 seconds to facilitate improved safety in all ambulation  - Patient will be independent in basic HEP 2-3 weeks  - Patient will improve 5xSTS score by 2.3 seconds to promote improved LE functional strength needed for ADLs  - Patient will complete components of HiMAT to promote agility necessary for sports related tasks    Long Term Goals (12 weeks):  - Patient will be independent in a comprehensive home exercise program  - Patient will improve scoring on DGI by 2.6 points to progress safety  - Patient will improve gait speed by 0.18 m/s to improve safety with community ambulation  - Patient will improve CARTER by 6 points in order to improve static balance and reduce risk for falls  - Patient will improve scoring on FGA by 4 points to progress safety with dynamic tasks  - Patient will be able to demonstrate HT in gait without veering  - Patient will improve 6 Minute Walk Test score by 190 feet to promote improved cardiovascular endurance  - Patient will report 50% reduction in near falls in order to improve safety with functional tasks and reduce his risk for falls  - Patient will report going on walks at least 3 days per week to promote independence and improved cardiovascular endurance  - Patient will be able to ascend/descend stairs reciprocally with 1 UE assist to promote independence and safety with ADLs  - Patient will report 50% reduction in near falls when ambulating on uneven terrain      Cut off score    All date taken from APTA Neuro Section or Rehab Measures      Carter/58  MDC: 6 pts  Age Norms:  57-79: M - 54   F - 55  70-79: M - 47   F - 53  80-89: M - 48   F - 50 5xSTS: Mallory et al 2010  MDC: 2.3 sec  Age Norms:  60-69: 11.1 sec  70-79: 12.6 sec  80-89: 14.8 sec   TUG  MDC: 4.14 sec  Cut off score:  >13.5 sec community dwelling adults  >32.2 frail elderly  <20 I for basic transfers  >30 dependent on transfers 10 Meter Walk Test: Lamberto Narayan and Liset chapin 2011  MDC: 0.18 m/s  20-29: M - 1.35 m   F - 1.34 m  30-39: M - 1.43 m   F - 1.34 m  40-49: M - 1.43 m   F - 1.39 m  50-59: M - 1.43 m   F - 1.31 m  60-69: M - 1.34 m   F - 1.24 m  70-79: M - 1.26 m   F - 1.13 m  80-89: M - 0.97 m   F - 0.94 m    Household Ambulator < 0.4 m/s  Limited [employer] 0.4 - 0.8 m/s  [employer] 0.8 - 1.2 m/s  Safely cross the street > 1.2 m/s   FGA  MCID: 4 pts  Geriatrics/community < 22/30 fall risk  Geriatrics/community < 20/30 unexplained falls    DGI  MDC: vestibular - 4 pts  MDC: geriatric/community - 3 pts  Falls risk <19/24 mCTSIB  Norm: 20-60 yrs  Eyes open firm: norm sway 0.21-0.48  Eyes closed firm: norm sway 0.48-0.99  Eyes open foam: norm sway 0.38-0.71  Eyes closed foam: norm sway 0.70-2.22   6 Minute Walk Test  MDC: 190.98 ft  MCID: 164 ft    Age Norms  57-79: M - 1876 ft (571.80 m)  F - 1765 ft (537.98 m)  70-79: M - 1729 ft (527.00 m)  F - 1545 ft (470.92 m)  80-89: M - 1368 ft (416.97 m)  F - 1286 ft (391.97 m) ABC: [address], [facility]  <67% increased risk for falls         Subjective    History of Present Illness  - Mechanism of injury: Patient is an 79year old female presenting to PT with complaints of difficulty climbing stairs, imbalance and history of falls. Past medical history is significant for controlled HTN, L hip pain, and substantial surgical history. She reports she's only had one recent fall (August 2023) which occurred because she missed a step, lost her balance anteriorly and landed on her R hand and face. She is currently attending hand therapy for subsequent fracture in R hand. Franco Ballesteros reports feelings of dizziness described as "lightheaded and floaty" that accompanies imbalance especially when waking up in the middle of the night and walking in the dark. She reports dizziness does not last longer than a couple of seconds and denies any falls proceeded by described dizzines. Patient does not currently ambulate with an AD but her daughter has purchased her a SPC due to her unsteadiness.      - Primary AD: daughter bought her a cane recently, no ad at home or in community.   - Assist level at home: independent  - Decreased fine motor tasks: No      Pain  - Current pain ratin/10  - At best pain ratin/10  - At worst pain ratin-7/10  - Location: L buttock, hip  - Aggravating factors: standing, morning    Social Support  - Steps to enter house: none, 1 garage steps  - Stairs in house: no steps, ranch style house  - Lives in: ranch style house  - Lives with:  and daughter visits occasionally    - Employment status: retired,  for [employer]  - Hand dominance: R handed    Treatments  - Previous treatment: Hand therapy, ortho PT for shoulder   - Current treatment: Hand therapy  - Diagnostic Testing: x-ray from fall of hand.        Objective     LE MMT  - R Hip Flexion: 3+/5  L Hip Flexion: 4-/5  - R Hip Extension: 4/5  L Hip Extension: 4/5  - R Hip Abduction: 4/5  L Hip Abduction: 4/5  - R Hip Adduction: 4-/5  L Hip Adduction: 4+/5  - R Knee Extension: 4/5 (pain in R quad)  L Knee Extension: 5/5  - R Knee Flexion: 5/5  L Knee Flexion: 5/5  - R Ankle DF: 4-/5   L Ankle DF: 4/5  - R Ankle PF: 5/5   L Ankle PF: 5/5    Sensation  - Light touch: normla, B/L numbness at lateral aspects of shins    Coordination  - Heel to Shin: Normal, weakness evident  - Alternate Toe Taps: normal  - Finger to Nose: normal    Myelopathy Screen (>3/5 +)  - Lowry's Reflex: -  - Inverted Supinator Sign: -  - Age > 45: Yes  - Gait Deviation: No      Gait  - Abnormalities: decreased gait speed, decreased trunk rotation, decreased B/L foot clearance           Outcome Measures Initial Eval  23        5xSTS 20.56 sec        TUG  - Regular  - Cognitive   23.38 sec  50.54 sec        10 meter defer        FGA         DGI defer        mCTSIB  - FTEO (firm)  - FTEC (firm)  - FTEO (foam)  - FTEC (foam)   30 sec  30 sec  30 sec  4.75 sec        6MWT defer                                     Precautions:   Past Medical History:   Diagnosis Date   • Arthritis     osteo   • Autoimmune hepatitis (HCC)    • Back pain with radiation     to bilat legs   • De Santiago esophagus    • De Santiago's esophagus    • Chronic GERD    • Chronic uveitis     bilateral   • CPAP (continuous positive airway pressure) dependence    • Diabetes mellitus (HCC)     diet controlled   • Disease of thyroid gland     hypo   • Diverticulitis    • Fibromyalgia syndrome    • Fibromyalgia, primary    • Hiatal hernia    • Hx of colonoscopy 2016    EGD done simultaneously   • Hyperlipidemia    • Hypertension    • Infectious viral hepatitis    • PONV (postoperative nausea and vomiting)    • Sinus congestion    • Sleep apnea     CPAP rx, does not use mask

## 2023-09-28 ENCOUNTER — OFFICE VISIT (OUTPATIENT)
Facility: CLINIC | Age: 83
End: 2023-09-28
Payer: MEDICARE

## 2023-09-28 ENCOUNTER — OFFICE VISIT (OUTPATIENT)
Dept: OCCUPATIONAL THERAPY | Facility: CLINIC | Age: 83
End: 2023-09-28
Payer: MEDICARE

## 2023-09-28 DIAGNOSIS — S62.346D CLOSED NONDISPLACED FRACTURE OF BASE OF FIFTH METACARPAL BONE OF RIGHT HAND WITH ROUTINE HEALING, SUBSEQUENT ENCOUNTER: Primary | ICD-10-CM

## 2023-09-28 DIAGNOSIS — R26.89 BALANCE DISORDER: Primary | ICD-10-CM

## 2023-09-28 DIAGNOSIS — R26.89 OTHER ABNORMALITIES OF GAIT AND MOBILITY: ICD-10-CM

## 2023-09-28 PROCEDURE — 97112 NEUROMUSCULAR REEDUCATION: CPT

## 2023-09-28 PROCEDURE — 97140 MANUAL THERAPY 1/> REGIONS: CPT

## 2023-09-28 PROCEDURE — 97110 THERAPEUTIC EXERCISES: CPT

## 2023-09-28 NOTE — PROGRESS NOTES
Daily Note     Today's date: 2023  Patient name: Naida Garcia  : 1940  MRN: [id number]  Referring provider: Kristen Fournier DO  Dx:   Encounter Diagnosis     ICD-10-CM    1. Balance disorder  R26.89       2. Other abnormalities of gait and mobility  R26.89           Start Time: 1420  Stop Time: 1508  Total time in clinic (min): 48 minutes    Subjective: No new complaints      Objective: See treatment diary below:    NMR:  -STS x 10 reps no UE  - pt reported dizziness upon lying supine one pillow. -VOR x 1 60 seconds each H/V, with mild dizziness provoked with Horiz, mild nausea provoked with vertical    TA:  -R head thrust positive (intermittently) and L negative   -DVA screen negative, mild dizziness provoked    -OH screen   BP supine 130/70 mmHG R UE  Upon sitting up: pt c/o woosiness x 33 seconds. /70 mmHg  Upon standing: /70 and no dizziness. HEP:  -VOR x 1 seated 60 sec H and V 2 sets 3 times daily     Assessment: Tolerated treatment well. Patient would benefit from continued PT Dizziness reported with supine 1 pillow. R head thrust (+). DVA screen negative. OH screen negative. Plan: Continue per plan of care. Plan for positional testing next visit. Consider JPET testing.       Outcome Measures Initial Eval  23  See above       5xSTS 20.56 sec        TUG  - Regular  - Cognitive   23.38 sec  50.54 sec        10 meter defer        FGA         DGI defer        mCTSIB  - FTEO (firm)  - FTEC (firm)  - FTEO (foam)  - FTEC (foam)   30 sec  30 sec  30 sec  4.75 sec        6MWT defer                                     Precautions:   Past Medical History:   Diagnosis Date   • Arthritis     osteo   • Autoimmune hepatitis ([address])    • Back pain with radiation     to bilat legs   • De Santiago esophagus    • De Santiago's esophagus    • Chronic GERD    • Chronic uveitis     bilateral   • CPAP (continuous positive airway pressure) dependence    • Diabetes mellitus (HCC)     diet controlled   • Disease of thyroid gland     hypo   • Diverticulitis    • Fibromyalgia syndrome    • Fibromyalgia, primary    • Hiatal hernia    • Hx of colonoscopy 2016    EGD done simultaneously   • Hyperlipidemia    • Hypertension    • Infectious viral hepatitis    • PONV (postoperative nausea and vomiting)    • Sinus congestion    • Sleep apnea     CPAP rx, does not use mask

## 2023-10-03 ENCOUNTER — APPOINTMENT (OUTPATIENT)
Dept: RADIOLOGY | Facility: CLINIC | Age: 83
End: 2023-10-03
Payer: MEDICARE

## 2023-10-03 ENCOUNTER — OFFICE VISIT (OUTPATIENT)
Dept: OBGYN CLINIC | Facility: CLINIC | Age: 83
End: 2023-10-03
Payer: MEDICARE

## 2023-10-03 ENCOUNTER — OFFICE VISIT (OUTPATIENT)
Facility: CLINIC | Age: 83
End: 2023-10-03
Payer: MEDICARE

## 2023-10-03 ENCOUNTER — OFFICE VISIT (OUTPATIENT)
Dept: OCCUPATIONAL THERAPY | Facility: CLINIC | Age: 83
End: 2023-10-03
Payer: MEDICARE

## 2023-10-03 VITALS
HEIGHT: 58 IN | WEIGHT: 153 LBS | SYSTOLIC BLOOD PRESSURE: 114 MMHG | HEART RATE: 67 BPM | DIASTOLIC BLOOD PRESSURE: 76 MMHG | BODY MASS INDEX: 32.12 KG/M2

## 2023-10-03 DIAGNOSIS — S62.346D CLOSED NONDISPLACED FRACTURE OF BASE OF FIFTH METACARPAL BONE OF RIGHT HAND WITH ROUTINE HEALING, SUBSEQUENT ENCOUNTER: Primary | ICD-10-CM

## 2023-10-03 DIAGNOSIS — R26.89 BALANCE DISORDER: Primary | ICD-10-CM

## 2023-10-03 DIAGNOSIS — R26.89 OTHER ABNORMALITIES OF GAIT AND MOBILITY: ICD-10-CM

## 2023-10-03 DIAGNOSIS — S62.346D CLOSED NONDISPLACED FRACTURE OF BASE OF FIFTH METACARPAL BONE OF RIGHT HAND WITH ROUTINE HEALING, SUBSEQUENT ENCOUNTER: ICD-10-CM

## 2023-10-03 PROCEDURE — 99212 OFFICE O/P EST SF 10 MIN: CPT | Performed by: ORTHOPAEDIC SURGERY

## 2023-10-03 PROCEDURE — 97110 THERAPEUTIC EXERCISES: CPT

## 2023-10-03 PROCEDURE — 97530 THERAPEUTIC ACTIVITIES: CPT

## 2023-10-03 PROCEDURE — 73130 X-RAY EXAM OF HAND: CPT

## 2023-10-03 PROCEDURE — 97140 MANUAL THERAPY 1/> REGIONS: CPT

## 2023-10-03 NOTE — PROGRESS NOTES
Daily Note     Today's date: 10/3/2023  Patient name: Young Frances  : 1940  MRN: [id number]  Referring provider: Jim Rosenthal DO  Dx:   Encounter Diagnosis     ICD-10-CM    1. Balance disorder  R26.89       2. Other abnormalities of gait and mobility  R26.89                      Subjective: Patient reports to physical therapy with no falls or new complaints. Objective: See treatment diary below:    NMR:   - mVBI: negative bilaterally    - R christiano-hallpike: negative, subjective symptoms of "on a boat"  - L christiano- hallpike: negative, subjective symptoms of dizziness  - R roll test: negative  - L roll test: negative    - BP laying down: 141/71  - BP sitting up: 110/60, subjective reports of dizziness   - BP standin/59, no subjective reports of dizziness      Assessment: Tolerated treatment well. Patient continues to report subjective feelings of dizziness upon supine <> sit transfers. All positional testing for possible BPPV was negative, however patient was symptomatic in posterior canal for both L and R side. Based on negative findings via positionals and subjective reports of dizziness, further assessed possible orthostatic connection. Patient was positive for orthostatic hypotension via testing this date. Educated patient to speak to her doctor about her BP medication and patient was in verbal agreement. Vestibular hypofunction/weakness could be a potential differential diagnosis based on patient's report of feeling "off balance" with ADLs. Plan: Continue per plan of care. Follow-up with patient about speaking with doctor.      Outcome Measures Initial Eval  23  See above       5xSTS 20.56 sec        TUG  - Regular  - Cognitive   23.38 sec  50.54 sec        10 meter defer        FGA         DGI defer        mCTSIB  - FTEO (firm)  - FTEC (firm)  - FTEO (foam)  - FTEC (foam)   30 sec  30 sec  30 sec  4.75 sec        6MWT defer                                     Precautions:   Past Medical History:   Diagnosis Date   • Arthritis     osteo   • Autoimmune hepatitis ([address])    • Back pain with radiation     to bilat legs   • De Santiago esophagus    • De Santiaog's esophagus    • Chronic GERD    • Chronic uveitis     bilateral   • CPAP (continuous positive airway pressure) dependence    • Diabetes mellitus (HCC)     diet controlled   • Disease of thyroid gland     hypo   • Diverticulitis    • Fibromyalgia syndrome    • Fibromyalgia, primary    • Hiatal hernia    • Hx of colonoscopy 2016    EGD done simultaneously   • Hyperlipidemia    • Hypertension    • Infectious viral hepatitis    • PONV (postoperative nausea and vomiting)    • Sinus congestion    • Sleep apnea     CPAP rx, does not use mask

## 2023-10-03 NOTE — PROGRESS NOTES
Assessment/Plan:  1. Closed nondisplaced fracture of base of fifth metacarpal bone of right hand with routine healing, subsequent encounter  XR hand 3+ vw right          • The patient has done very well in therapy. She has full range of motion and strength in her hand. • I recommended she see therapy for her last visit this Thursday. She is ready and willing to do a home exercise program to work on strengthening. • The patient was given an opportunity to ask questions. Questions were answered to their satisfaction. • Through shared decision making, the patient decided to move forward with occupational hand therapy and home exercises. • Follow up as needed      cc: Follow-up for my right hand    Subjective: Osman Marmolejo is a right hand dominant 79 y.o. female who presents for follow-up evaluation of the right hand. She has been attending occupational hand therapy, which has been going well. She presents in the office today without a splint. She denies experiencing any pain today      Review of Systems   Constitutional: Negative for chills and fever. HENT: Negative for ear pain and sore throat. Eyes: Negative for pain and visual disturbance. Respiratory: Negative for cough and shortness of breath. Cardiovascular: Negative for chest pain and palpitations. Gastrointestinal: Negative for abdominal pain and vomiting. Genitourinary: Negative for dysuria and hematuria. Musculoskeletal: Negative for arthralgias and back pain. Skin: Negative for color change and rash. Neurological: Negative for seizures and syncope. All other systems reviewed and are negative.         Past Medical History:   Diagnosis Date   • Arthritis     osteo   • Autoimmune hepatitis ([address])    • Back pain with radiation     to bilat legs   • De Santiago esophagus    • De Santiago's esophagus    • Chronic GERD    • Chronic uveitis     bilateral   • CPAP (continuous positive airway pressure) dependence    • Diabetes mellitus ([address]) diet controlled   • Disease of thyroid gland     hypo   • Diverticulitis    • Fibromyalgia syndrome    • Fibromyalgia, primary    • Fractures    • Hiatal hernia    • Hx of colonoscopy 2016    EGD done simultaneously   • Hyperlipidemia    • Hypertension    • Infectious viral hepatitis    • Osteoarthritis    • PONV (postoperative nausea and vomiting)    • Sinus congestion    • Sleep apnea     CPAP rx, does not use mask       Past Surgical History:   Procedure Laterality Date   • BILATERAL SALPINGOOPHORECTOMY  1996   • BREAST BIOPSY Right    • CARPAL TUNNEL RELEASE Right 2004   • CHOLECYSTECTOMY     • COLONOSCOPY     • FRACTURE SURGERY Right     ORIF femur, emily in place   • HAND SURGERY     • HIP SURGERY     • HYSTERECTOMY  1974    GOPAL   • JOINT REPLACEMENT Right 2004    knee   • JOINT REPLACEMENT Left 2007    knee   • JOINT REPLACEMENT Right 2011    hip   • JOINT REPLACEMENT Left 2017    knee   • KNEE SURGERY  2004,  2007   • NV XCAPSL CTRC RMVL INSJ IO LENS PROSTH W/O ECP Right 08/22/2016    Procedure: EXTRACTION EXTRACAPSULAR CATARACT PHACO INTRAOCULAR LENS (IOL); Surgeon: Ghazal Baxter MD;  Location: [facility] MAIN OR;  Service: Ophthalmology   • NV XCAPSL CTRC RMVL INSJ IO LENS PROSTH W/O ECP Left 11/26/2018    Procedure: EXTRACTION EXTRACAPSULAR CATARACT PHACO INTRAOCULAR LENS (IOL);   Surgeon: Ghazal Baxter MD;  Location: [facility] MAIN OR;  Service: Ophthalmology   • UPPER GASTROINTESTINAL ENDOSCOPY         Family History   Problem Relation Age of Onset   • Heart disease Mother    • Diverticulitis Mother    • Stroke Father    • COPD Sister    • Liver disease Sister    • Anesthesia problems Sister    • Diabetes Brother    • Cancer Brother    • Cancer Maternal Grandmother         breast   • Pancreatic cancer Cousin    • Cancer Maternal Grandfather    • Cancer Paternal Uncle        Social History     Occupational History   • Not on file   Tobacco Use   • Smoking status: Never     Passive exposure: Past   • Smokeless tobacco: Never   Vaping Use   • Vaping Use: Never used   Substance and Sexual Activity   • Alcohol use: No   • Drug use: Never   • Sexual activity: Not Currently     Partners: Male     Birth control/protection: Condom Male         Current Outpatient Medications:   •  acetaminophen (TYLENOL) 325 mg tablet, Take 650 mg by mouth every 6 (six) hours as needed for mild pain., Disp: , Rfl:   •  amLODIPine (NORVASC) 5 mg tablet, Take 1 tablet (5 mg total) by mouth every morning, Disp: 90 tablet, Rfl: 1  •  atorvastatin (LIPITOR) 10 mg tablet, Take 1 tablet (10 mg total) by mouth daily, Disp: 90 tablet, Rfl: 1  •  carvedilol (COREG) 12.5 mg tablet, Take 1 tablet (12.5 mg total) by mouth 2 (two) times a day with meals, Disp: 180 tablet, Rfl: 1  •  carvedilol (COREG) 3.125 mg tablet, Take 3.125 mg by mouth 2 (two) times a day with meals, Disp: , Rfl:   •  famotidine (PEPCID) 20 mg tablet, Take 1 tablet (20 mg total) by mouth 2 (two) times a day, Disp: 180 tablet, Rfl: 0  •  gabapentin (Neurontin) 100 mg capsule, Take 1 capsule (100 mg total) by mouth daily at bedtime, Disp: 90 capsule, Rfl: 1  •  levothyroxine 75 mcg tablet, Take 1 tablet (75 mcg total) by mouth in the morning, Disp: 90 tablet, Rfl: 1  •  lisinopril (ZESTRIL) 40 mg tablet, TAKE ONE TABLET BY MOUTH EVERY DAY, Disp: 90 tablet, Rfl: 1  •  polyethylene glycol (MIRALAX) 17 g packet, Take 17 g by mouth every other day, Disp: , Rfl:   •  spironolactone (ALDACTONE) 25 mg tablet, , Disp: , Rfl:     Allergies   Allergen Reactions   • Biaxin [Clarithromycin] GI Intolerance   • Codeine Other (See Comments)     dizzy   • Latex Hives     Blisters and hives local reaction   • Levaquin [Levofloxacin In D5w] GI Intolerance   • Morphine And Related GI Intolerance   • Nsaids GI Intolerance   • Oxycodone Other (See Comments)     dizzy   • Percocet [Oxycodone-Acetaminophen] Other (See Comments)     dizzy   • Ultram [Tramadol Hcl] Other (See Comments)     dizzy Objective:  Vitals:    10/03/23 1133   BP: 114/76   Pulse: 67       The patient was awake, alert, and oriented to person, place, and time. No acute distress. Normocephalic. EOMI. Mucous membranes moist.  Normal respiratory effort. Examination of the affected extremity was compared to the unaffected extremity. Skin was intact. No swelling or ecchymosis. No deformity. Hand and fingers were warm and well-perfused. Capillary refill was brisk. Full active range of motion of the elbows, forearms, wrists, and fingers. 5/5  wrist flexor/extensors and . No tenderness to palpation at the healed fracture site. Imaging/Diagnostic Studies:    I reviewed imaging studies dated 10/3/2023 which included 3 views. These images studies demonstrated a healed fracture of the base of the fifth metacarpal.    Scribe Attestation    I,:  Aggie Vogt am acting as a scribe while in the presence of the attending physician.:       I,:  Becky Ramos MD personally performed the services described in this documentation    as scribed in my presence.:         This document was created using speech voice recognition software. Grammatical errors, random word insertions, pronoun errors, and incomplete sentences are an occasional consequence of this system due to software limitations, ambient noise, and hardware issues. Any formal questions or concerns about content, text, or information contained within the body of this dictation should be directly addressed to the provider for clarification.

## 2023-10-03 NOTE — PROGRESS NOTES
Daily Note     Today's date: 10/3/2023  Patient name: Gonzales Muniz  : 1940  MRN: [id number]  Referring provider: Hitesh Tovar DO  Dx:   Encounter Diagnosis     ICD-10-CM    1. Closed nondisplaced fracture of base of fifth metacarpal bone of right hand with routine healing, subsequent encounter  S62.346D                    Subjective: Patient reports, "it's easier to move around pots and things in the kitchen, I can tell it's feeling better now". Objective: See treatment diary below       Precautions: Universal    Fall occurred on : currently 7 weeks post fx. NO STRENGTH     Visit Tracker: NO AUTH REQ: Dorathy Duos  Date   IE 9/20 9/25 9/28 10/2                                                                                PMHx:   has a past medical history of Arthritis, Autoimmune hepatitis ([address]), Back pain with radiation, De Santiago esophagus, De Santiago's esophagus, Chronic GERD, Chronic uveitis, CPAP (continuous positive airway pressure) dependence, Diabetes mellitus ([address]), Disease of thyroid gland, Diverticulitis, Fibromyalgia syndrome, Fibromyalgia, primary, Hiatal hernia, colonoscopy (), Hyperlipidemia, Hypertension, Infectious viral hepatitis, PONV (postoperative nausea and vomiting), Sinus congestion, and Sleep apnea.     Precautions: universal    Manuals HEP 10/3/2023     Edema mobilization   x10 min   PROM  Gentle              Ther Ex     Education on HEP and dx  x10min   Tendon Glides X 2 x10    AROM wrist Flex/ext x x20   AROM wrist RD/UD x x20    AROM Forearm rotation  x x20   Digit add/abduction   x20                  Ther Act      Towel Crawl   x10   Wrist maze   x5    Dice with alt. digits  Whole jar   9 hole peg   x2 with alt digits   Wrist roller  x10 CW/CCW             Modalities     MHP  x5 min           Assessment:   Patient presents to therapy with decreased presence of swelling on ulnar side of hand and improvement in AROM of all digits, able to perform a composite fist post MHP and manual therapy with minimal discomfort. However unable to make a tight composite fist. Manual treatment performed at beginning of session with focus on edema mobilization to reduce swelling. Gentle passive range of motion performed following edema mobilization. TE/TA performed following manual treatment consisting of active range of motion exercises of digits and wrist.  Patient tolerated session well. Patient tolerated all TE/TA with no compaints. Patient progressing well towards goals. Patient benefiting from skilled hand therapy OT to reduce deficits to improve independence with daily activities. Plan:   Focus on HEP, A/AA/PROM, strengthening, TA, TE, manual therapy, joint preservation techniques, edema management, modalities PRN to improve ability to complete daily activites with ease. POC 9/18/2023 - 10/30/2023.

## 2023-10-04 ENCOUNTER — TELEPHONE (OUTPATIENT)
Age: 83
End: 2023-10-04

## 2023-10-04 NOTE — TELEPHONE ENCOUNTER
Pt w hx of GERD, barretts, lymphocytic colitis, autoimmune hepatitis  Last seen 6/2023. She has noticed her stool is skinny and small. No other mention of symptoms. She is using miralax every other day. I advised Increase MiraLAX to daily dosing and maintain high-fiber diet, adequate hydration.  OV scheduled to further discuss

## 2023-10-05 ENCOUNTER — OFFICE VISIT (OUTPATIENT)
Facility: CLINIC | Age: 83
End: 2023-10-05
Payer: MEDICARE

## 2023-10-05 ENCOUNTER — OFFICE VISIT (OUTPATIENT)
Dept: OCCUPATIONAL THERAPY | Facility: CLINIC | Age: 83
End: 2023-10-05
Payer: MEDICARE

## 2023-10-05 DIAGNOSIS — S62.346D CLOSED NONDISPLACED FRACTURE OF BASE OF FIFTH METACARPAL BONE OF RIGHT HAND WITH ROUTINE HEALING, SUBSEQUENT ENCOUNTER: Primary | ICD-10-CM

## 2023-10-05 DIAGNOSIS — R26.89 BALANCE DISORDER: Primary | ICD-10-CM

## 2023-10-05 DIAGNOSIS — R26.89 OTHER ABNORMALITIES OF GAIT AND MOBILITY: ICD-10-CM

## 2023-10-05 PROCEDURE — 97110 THERAPEUTIC EXERCISES: CPT

## 2023-10-05 PROCEDURE — 97112 NEUROMUSCULAR REEDUCATION: CPT

## 2023-10-05 NOTE — PROGRESS NOTES
OT-Discharge     Today's date: 10/5/2023  Patient name: Helene Carpio  : 1940  MRN: [id number]  Referring provider: Sohail Hollingsworth DO  Dx:   Encounter Diagnosis     ICD-10-CM    1. Closed nondisplaced fracture of base of fifth metacarpal bone of right hand with routine healing, subsequent encounter  S62.346D                    Subjective: Patient reports to have seen referring physician on 10/3. Patient reports referring physician to be happy with current progress and patient can discontinue therapy at this time. Patient reports to be happy with current functional level and will like to perform exercises at home. Objective: See treatment diary below        Goals  Short Term Goals by 2 - 4 weeks:    1. Establish HEP to increase performance with daily activities. MET    2. Patient will increase  strength by at least 4 lbs to assist in completing ADLs. MET    3. Patient will increase pinch strength by at least 2 lbs to assist in completing ADLs. MET    4. Patient will increase R hand 5th digit ROM by at least 5 degrees to complete ADLs such as toileting MET    5. Patient will decrease pain rate of UE by at least 2 points per the VAS scale. MET        Long Term Goals by discharge:    1. Establish final home exercise program to enhance maximal functional level withMET ADLs. 2.  Achieve functional active range of motion of R hand 5th digit ROM by at least 10 degrees for full return to household chores. MET                            3. Patient will increase R hand  strength by at least 8 lbs for full return to high level ADLs.   MET    Pain  Current pain ratin  At best pain ratin  At worst pain rating: 3  Quality: dull ache  Hand dominance: right    Objective     Active Range of Motion     Right Wrist   Wrist flexion: 65 degrees   Wrist extension: 60 degrees     Right Digits   Flexion   Little     MCP: 80    PIP: 90    DIP: 55    Strength/Myotome Testing     Right Wrist/Hand      (2nd hand position)     Trial 1: 35                   Daily Note       Precautions: Universal    Fall occurred on 8/7: currently 7 weeks post fx. NO STRENGTH     Visit Tracker: NO AUTH REQ: Shannon Nubia  Date 9/18  IE 9/20 9/25 9/28 10/2 10/5  D/C                                                                               PMHx:   has a past medical history of Arthritis, Autoimmune hepatitis ([address]), Back pain with radiation, De Santiago esophagus, De Santiago's esophagus, Chronic GERD, Chronic uveitis, CPAP (continuous positive airway pressure) dependence, Diabetes mellitus ([address]), Disease of thyroid gland, Diverticulitis, Fibromyalgia syndrome, Fibromyalgia, primary, Hiatal hernia, colonoscopy (2016), Hyperlipidemia, Hypertension, Infectious viral hepatitis, PONV (postoperative nausea and vomiting), Sinus congestion, and Sleep apnea. Precautions: universal    Manuals HEP 10/5/2023     Edema mobilization   x10 min   PROM  Gentle              Ther Ex     Education on HEP and dx  x10min   Tendon Glides X 2 x10    AROM wrist Flex/ext x x20   AROM wrist RD/UD x x20    AROM Forearm rotation  x x20   Digit add/abduction   x20                  Ther Act      Towel Crawl   x10   Wrist maze   x5    Dice with alt. digits  Whole jar   9 hole peg   x2 with alt digits   Wrist roller  x10 CW/CCW             Modalities     MHP  x5 min           Assessment:   Discharge performed at today's session. Patient being discharged at this time as she has met therapy goals and feels she is capable of performing all required daily tasks. Upon assessment patient presents with increased range of motion of digit and wrist. Strength measures taken reveal increased strength from previous measures taken at evaluation. Patient was educated on HEP consisting of strengthening and range of motion exercises. Patient was provided with handouts with and verbal instruction. Patient being discharged to [facility] at this time.        Plan:   Discharge to [facility] consisting of strengthening and range of motion exercises. Patient to return should any issues arise. POC 9/18/2023 - 10/30/2023.

## 2023-10-05 NOTE — PROGRESS NOTES
Daily Note     Today's date: 10/5/2023  Patient name: Jin Villarreal  : 1940  MRN: [id number]  Referring provider: Aydin Delarosa DO  Dx:   Encounter Diagnosis     ICD-10-CM    1. Balance disorder  R26.89       2. Other abnormalities of gait and mobility  R26.89           Start Time: 1424  Stop Time: 1512  Total time in clinic (min): 48 minutes    Subjective: Patient reports to physical therapy with no falls or new complaints. Objective: See treatment diary below:  Last visit:   (+) [place] screen last visit (supine to sit, with dizziness)   - R christiano-hallpike: negative, subjective symptoms of "on a boat"  - L christiano- hallpike: negative, subjective symptoms of dizziness       NMR:  -STS x 10 reps no UE x 2 sets ()    -VOR x 1 60 seconds each H/V, with mild dizziness provoked with Horiz 6/10 a few seconds to resolve upon completion    V:  Mild dizziness (lightheaded)  provoked 3/10  -standing uphill/downhill EO, EC, HT, HN 30 seconds each 2 sets   - seated core rotations 5# med ball 10 reps x 2 sets  -AP sway 20 reps   - tandem walking at bar 3 min   - tandem walking 2 steps then HT 2 min         HEP:  -VOR x 1 seated 60 sec H and V 2 sets 3 times daily           Assessment: Tolerated treatment well. Patient continues to report subjective feelings of dizziness upon supine <> sit transfers. She will speak to her MD about (+) [place] screen. Dizziness provoked with VOR x 1. Vestibular hypofunction/weakness could be a potential differential diagnosis based on patient's report of feeling "off balance" with ADLs. Plan: Continue per plan of care. Follow-up with patient about speaking with doctor.      Outcome Measures Initial Eval  23  See above       5xSTS 20.56 sec        TUG  - Regular  - Cognitive   23.38 sec  50.54 sec        10 meter defer        FGA         DGI defer        mCTSIB  - FTEO (firm)  - FTEC (firm)  - FTEO (foam)  - FTEC (foam)   30 sec  30 sec  30 sec  4.75 sec        6MWT defer Precautions:   Past Medical History:   Diagnosis Date   • Arthritis     osteo   • Autoimmune hepatitis ([address])    • Back pain with radiation     to bilat legs   • De Santiago esophagus    • De Santiago's esophagus    • Chronic GERD    • Chronic uveitis     bilateral   • CPAP (continuous positive airway pressure) dependence    • Diabetes mellitus (HCC)     diet controlled   • Disease of thyroid gland     hypo   • Diverticulitis    • Fibromyalgia syndrome    • Fibromyalgia, primary    • Hiatal hernia    • Hx of colonoscopy 2016    EGD done simultaneously   • Hyperlipidemia    • Hypertension    • Infectious viral hepatitis    • PONV (postoperative nausea and vomiting)    • Sinus congestion    • Sleep apnea     CPAP rx, does not use mask

## 2023-10-09 ENCOUNTER — APPOINTMENT (OUTPATIENT)
Dept: LAB | Facility: CLINIC | Age: 83
End: 2023-10-09
Payer: MEDICARE

## 2023-10-09 DIAGNOSIS — I10 ESSENTIAL HYPERTENSION: ICD-10-CM

## 2023-10-09 DIAGNOSIS — E78.2 MIXED HYPERLIPIDEMIA: ICD-10-CM

## 2023-10-09 DIAGNOSIS — I10 ESSENTIAL (PRIMARY) HYPERTENSION: ICD-10-CM

## 2023-10-09 DIAGNOSIS — E03.9 ACQUIRED HYPOTHYROIDISM: ICD-10-CM

## 2023-10-09 LAB
ALBUMIN SERPL BCP-MCNC: 4 G/DL (ref 3.5–5)
ALP SERPL-CCNC: 56 U/L (ref 34–104)
ALT SERPL W P-5'-P-CCNC: 20 U/L (ref 7–52)
ANION GAP SERPL CALCULATED.3IONS-SCNC: 7 MMOL/L
AST SERPL W P-5'-P-CCNC: 24 U/L (ref 13–39)
BILIRUB SERPL-MCNC: 0.5 MG/DL (ref 0.2–1)
BUN SERPL-MCNC: 23 MG/DL (ref 5–25)
CALCIUM SERPL-MCNC: 9.5 MG/DL (ref 8.4–10.2)
CHLORIDE SERPL-SCNC: 103 MMOL/L (ref 96–108)
CHOLEST SERPL-MCNC: 151 MG/DL
CO2 SERPL-SCNC: 28 MMOL/L (ref 21–32)
CREAT SERPL-MCNC: 0.57 MG/DL (ref 0.6–1.3)
ERYTHROCYTE [DISTWIDTH] IN BLOOD BY AUTOMATED COUNT: 13.1 % (ref 11.6–15.1)
GFR SERPL CREATININE-BSD FRML MDRD: 85 ML/MIN/1.73SQ M
GLUCOSE P FAST SERPL-MCNC: 92 MG/DL (ref 65–99)
HCT VFR BLD AUTO: 39.5 % (ref 34.8–46.1)
HDLC SERPL-MCNC: 46 MG/DL
HGB BLD-MCNC: 13.3 G/DL (ref 11.5–15.4)
LDLC SERPL CALC-MCNC: 88 MG/DL (ref 0–100)
MCH RBC QN AUTO: 28.7 PG (ref 26.8–34.3)
MCHC RBC AUTO-ENTMCNC: 33.7 G/DL (ref 31.4–37.4)
MCV RBC AUTO: 85 FL (ref 82–98)
PLATELET # BLD AUTO: 184 THOUSANDS/UL (ref 149–390)
PMV BLD AUTO: 11.5 FL (ref 8.9–12.7)
POTASSIUM SERPL-SCNC: 4.3 MMOL/L (ref 3.5–5.3)
PROT SERPL-MCNC: 6.9 G/DL (ref 6.4–8.4)
RBC # BLD AUTO: 4.64 MILLION/UL (ref 3.81–5.12)
SODIUM SERPL-SCNC: 138 MMOL/L (ref 135–147)
T4 FREE SERPL-MCNC: 1.06 NG/DL (ref 0.61–1.12)
TRIGL SERPL-MCNC: 86 MG/DL
TSH SERPL DL<=0.05 MIU/L-ACNC: 3.82 UIU/ML (ref 0.45–4.5)
WBC # BLD AUTO: 5.1 THOUSAND/UL (ref 4.31–10.16)

## 2023-10-09 PROCEDURE — 84443 ASSAY THYROID STIM HORMONE: CPT

## 2023-10-09 PROCEDURE — 80053 COMPREHEN METABOLIC PANEL: CPT

## 2023-10-09 PROCEDURE — 80061 LIPID PANEL: CPT

## 2023-10-09 PROCEDURE — 36415 COLL VENOUS BLD VENIPUNCTURE: CPT

## 2023-10-09 PROCEDURE — 84439 ASSAY OF FREE THYROXINE: CPT

## 2023-10-09 PROCEDURE — 85027 COMPLETE CBC AUTOMATED: CPT

## 2023-10-10 ENCOUNTER — OFFICE VISIT (OUTPATIENT)
Facility: CLINIC | Age: 83
End: 2023-10-10
Payer: MEDICARE

## 2023-10-10 ENCOUNTER — APPOINTMENT (OUTPATIENT)
Dept: OCCUPATIONAL THERAPY | Facility: CLINIC | Age: 83
End: 2023-10-10
Payer: MEDICARE

## 2023-10-10 DIAGNOSIS — R26.89 BALANCE DISORDER: Primary | ICD-10-CM

## 2023-10-10 DIAGNOSIS — R26.89 OTHER ABNORMALITIES OF GAIT AND MOBILITY: ICD-10-CM

## 2023-10-10 PROCEDURE — 97112 NEUROMUSCULAR REEDUCATION: CPT

## 2023-10-10 NOTE — PROGRESS NOTES
Daily Note     Today's date: 10/10/2023  Patient name: Cleo Fernandez  : 1940  MRN: [id number]  Referring provider: Michelle Rose DO  Dx:   Encounter Diagnosis     ICD-10-CM    1. Balance disorder  R26.89       2. Other abnormalities of gait and mobility  R26.89                      Subjective: Patient reports to PT session with no new issues or complaints. Objective: See treatment diary below:     NMR:  -STS from standard chair to fatigue: 10/10 reps     -VOR x 1 (FT, firm) x 60 seconds (2 sets)   H: 0/10 D; 6/10 D 2nd trial   V: 2/10 D; off balance 2nd trial    - Tandem ambulation in // bars x 6 cycles down/back  - AP sway (firm, EO) x 20 reps   - Step ups (6") x 15 reps B/L; 2 UE  - Standing core rotations w/ small TT: 1 minute (2 sets)    BP end of session: 124/64 mmHg    HEP:  -VOR x 1 seated 60 sec H and V 2 sets 3 times daily       Assessment: Patient tolerated treatment session well today with focus on vestibular adaptation, balance, and LE strength exercises. Progressed patient to perform VOR x 1 at increased speed for 2nd trial with patient reporting increase in dizziness and imbalance sensation. She did report episodes of lightheadedness throughout session that improved with seated rest breaks. Plan to assess BP at start and end of sessions in the future to further monitor. Patient will continue to benefit from skilled OPPT services to reduce overall risk for falls and maximize function. Plan: Continue per plan of care. Follow-up with patient about speaking with doctor.      Outcome Measures Initial Eval  23  See above       5xSTS 20.56 sec        TUG  - Regular  - Cognitive   23.38 sec  50.54 sec        10 meter defer        FGA         DGI defer        mCTSIB  - FTEO (firm)  - FTEC (firm)  - FTEO (foam)  - FTEC (foam)   30 sec  30 sec  30 sec  4.75 sec        6MWT defer                                     Precautions:   Past Medical History:   Diagnosis Date   • Arthritis osteo   • Autoimmune hepatitis ([address])    • Back pain with radiation     to bilat legs   • De Santiago esophagus    • De Santiago's esophagus    • Chronic GERD    • Chronic uveitis     bilateral   • CPAP (continuous positive airway pressure) dependence    • Diabetes mellitus (HCC)     diet controlled   • Disease of thyroid gland     hypo   • Diverticulitis    • Fibromyalgia syndrome    • Fibromyalgia, primary    • Hiatal hernia    • Hx of colonoscopy 2016    EGD done simultaneously   • Hyperlipidemia    • Hypertension    • Infectious viral hepatitis    • PONV (postoperative nausea and vomiting)    • Sinus congestion    • Sleep apnea     CPAP rx, does not use mask

## 2023-10-12 ENCOUNTER — OFFICE VISIT (OUTPATIENT)
Dept: PHYSICAL THERAPY | Facility: CLINIC | Age: 83
End: 2023-10-12
Payer: MEDICARE

## 2023-10-12 ENCOUNTER — RA CDI HCC (OUTPATIENT)
Dept: OTHER | Facility: HOSPITAL | Age: 83
End: 2023-10-12

## 2023-10-12 ENCOUNTER — APPOINTMENT (OUTPATIENT)
Dept: OCCUPATIONAL THERAPY | Facility: CLINIC | Age: 83
End: 2023-10-12
Payer: MEDICARE

## 2023-10-12 DIAGNOSIS — R26.89 BALANCE DISORDER: Primary | ICD-10-CM

## 2023-10-12 DIAGNOSIS — R26.89 OTHER ABNORMALITIES OF GAIT AND MOBILITY: ICD-10-CM

## 2023-10-12 PROCEDURE — 97112 NEUROMUSCULAR REEDUCATION: CPT

## 2023-10-12 NOTE — PROGRESS NOTES
[facility] coding opportunities       Chart reviewed, no opportunity found: CHART REVIEWED, NO OPPORTUNITY FOUND        Patients Insurance     Medicare Insurance: Medicare

## 2023-10-12 NOTE — PROGRESS NOTES
Daily Note     Today's date: 10/12/2023  Patient name: Amanuel Guerra  : 1940  MRN: [id number]  Referring provider: Brynn Lawrence DO  Dx:   Encounter Diagnosis     ICD-10-CM    1. Balance disorder  R26.89       2. Other abnormalities of gait and mobility  R26.89           Start Time: 1145  Stop Time: 1220  Total time in clinic (min): 35 minutes    Subjective: Patient reports to PT session with no new issues or complaints. Objective: See treatment diary below:     NMR:  -STS from standard chair to fatigue: 2x10      -VOR x 1 (FT, firm) x 60 seconds (2 sets)   H: 3/10 D; 3/10 D    V: 2/10 D; 2/10 D, no LOB    - Tandem ambulation in // bars x 6 cycles down/back   - AP sway (firm, EO) x 20 reps   - Step ups (6") x 15 reps B/L; 2 UE   - Standing core rotations w/ small TT: 1 minute (2 sets)     HEP:  -VOR x 1 seated 60 sec H and V 2 sets 3 times daily       Assessment: Patient tolerated treatment session well today with focus on vestibular adaptation, balance, and LE strength exercises. Patient reports worsening symptoms with horizontal VOR in comparison to vertical VOR. Patient tolerated all other activities well with no LOB. Plan to assess BP at start and end of sessions in the future to further monitor. Patient will continue to benefit from skilled OPPT services to reduce overall risk for falls and maximize function. Plan: Continue per plan of care. Follow-up with patient about speaking with doctor. Advance per primary PT.     Outcome Measures Initial Eval  23  See above       5xSTS 20.56 sec        TUG  - Regular  - Cognitive   23.38 sec  50.54 sec        10 meter defer        FGA         DGI defer        mCTSIB  - FTEO (firm)  - FTEC (firm)  - FTEO (foam)  - FTEC (foam)   30 sec  30 sec  30 sec  4.75 sec        6MWT defer                                     Precautions:   Past Medical History:   Diagnosis Date    Arthritis     osteo    Autoimmune hepatitis ([address])     Back pain with radiation     to bilat legs    De Santiago esophagus     De Santiago's esophagus     Chronic GERD     Chronic uveitis     bilateral    CPAP (continuous positive airway pressure) dependence     Diabetes mellitus (HCC)     diet controlled    Disease of thyroid gland     hypo    Diverticulitis     Fibromyalgia syndrome     Fibromyalgia, primary     Hiatal hernia     Hx of colonoscopy 2016    EGD done simultaneously    Hyperlipidemia     Hypertension     Infectious viral hepatitis     PONV (postoperative nausea and vomiting)     Sinus congestion     Sleep apnea     CPAP rx, does not use mask

## 2023-10-17 ENCOUNTER — OFFICE VISIT (OUTPATIENT)
Facility: CLINIC | Age: 83
End: 2023-10-17
Payer: MEDICARE

## 2023-10-17 ENCOUNTER — APPOINTMENT (OUTPATIENT)
Dept: OCCUPATIONAL THERAPY | Facility: CLINIC | Age: 83
End: 2023-10-17
Payer: MEDICARE

## 2023-10-17 DIAGNOSIS — R26.89 BALANCE DISORDER: Primary | ICD-10-CM

## 2023-10-17 DIAGNOSIS — R26.89 OTHER ABNORMALITIES OF GAIT AND MOBILITY: ICD-10-CM

## 2023-10-17 PROCEDURE — 97112 NEUROMUSCULAR REEDUCATION: CPT

## 2023-10-17 NOTE — PROGRESS NOTES
Daily Note     Today's date: 10/17/2023  Patient name: Zaria Horner  : 1940  MRN: [id number]  Referring provider: Diane Carmona DO  Dx:   Encounter Diagnosis     ICD-10-CM    1. Balance disorder  R26.89       2. Other abnormalities of gait and mobility  R26.89             Start Time: 1336  Stop Time: 1418  Total time in clinic (min): 42 minutes    Subjective: Patient reports to PT session with no new issues or complaints. Arrived without knee pain. Arrived without dizziness. Objective: See treatment diary below: Monitor BP pre/post starting NV. NMR:  -STS from standard chair to fatigue: 10 reps, 5 reps, 10 reps with knee pain last few reps of each set     -VOR x 1 (FT, firm) x 60 seconds (2 sets)   H: 3/10 D; 4/10 D with nausea   V: 2/10 D; 1/10 D, no LOB    - not today: andem ambulation in // bars x 6 cycles down/back   - not today: AP sway (firm, EO) x 20 reps   - Step ups (6") x 15 reps No UE for L, 1 UE for R LE.    - Standing core rotations w/ small TT: 1 minute (2 sets)     HEP:  -VOR x 1 seated 60 sec H and V 2 sets 3 times daily       Assessment: Patient tolerated treatment session well today with focus on vestibular adaptation, balance, and LE strength exercises. Mild nausea provoked with VPR  x 1 horizontal, but this symptom resolved quickly. Patient reports worsening symptoms with horizontal VOR in comparison to vertical VOR. Patient tolerated all other activities well with no LOB. Plan to assess BP at start and end of sessions in the future to further monitor. Patient will continue to benefit from skilled OPPT services to reduce overall risk for falls and maximize function. Plan: Continue per plan of care. Follow-up with patient about speaking with doctor. Advance per primary PT.     Outcome Measures Initial Eval  23  See above       5xSTS 20.56 sec        TUG  - Regular  - Cognitive   23.38 sec  50.54 sec        10 meter defer        FGA         DGI defer mCTSIB  - FTEO (firm)  - FTEC (firm)  - FTEO (foam)  - FTEC (foam)   30 sec  30 sec  30 sec  4.75 sec        6MWT defer                                     Precautions:   Past Medical History:   Diagnosis Date    Arthritis     osteo    Autoimmune hepatitis ([address])     Back pain with radiation     to bilat legs    De Santiago esophagus     De Santiago's esophagus     Chronic GERD     Chronic uveitis     bilateral    CPAP (continuous positive airway pressure) dependence     Diabetes mellitus (HCC)     diet controlled    Disease of thyroid gland     hypo    Diverticulitis     Fibromyalgia syndrome     Fibromyalgia, primary     Hiatal hernia     Hx of colonoscopy 2016    EGD done simultaneously    Hyperlipidemia     Hypertension     Infectious viral hepatitis     PONV (postoperative nausea and vomiting)     Sinus congestion     Sleep apnea     CPAP rx, does not use mask

## 2023-10-18 DIAGNOSIS — K21.00 GASTROESOPHAGEAL REFLUX DISEASE WITH ESOPHAGITIS WITHOUT HEMORRHAGE: ICD-10-CM

## 2023-10-18 DIAGNOSIS — I10 ESSENTIAL HYPERTENSION: ICD-10-CM

## 2023-10-18 RX ORDER — AMLODIPINE BESYLATE 5 MG/1
5 TABLET ORAL DAILY
Qty: 90 TABLET | Refills: 1 | Status: SHIPPED | OUTPATIENT
Start: 2023-10-18

## 2023-10-18 RX ORDER — FAMOTIDINE 20 MG/1
20 TABLET, FILM COATED ORAL 2 TIMES DAILY
Qty: 180 TABLET | Refills: 1 | Status: SHIPPED | OUTPATIENT
Start: 2023-10-18

## 2023-10-18 RX ORDER — CARVEDILOL 12.5 MG/1
12.5 TABLET ORAL 2 TIMES DAILY WITH MEALS
Qty: 180 TABLET | Refills: 1 | Status: SHIPPED | OUTPATIENT
Start: 2023-10-18

## 2023-10-19 ENCOUNTER — OFFICE VISIT (OUTPATIENT)
Facility: CLINIC | Age: 83
End: 2023-10-19
Payer: MEDICARE

## 2023-10-19 ENCOUNTER — APPOINTMENT (OUTPATIENT)
Dept: OCCUPATIONAL THERAPY | Facility: CLINIC | Age: 83
End: 2023-10-19
Payer: MEDICARE

## 2023-10-19 DIAGNOSIS — R26.89 OTHER ABNORMALITIES OF GAIT AND MOBILITY: ICD-10-CM

## 2023-10-19 DIAGNOSIS — R26.89 BALANCE DISORDER: Primary | ICD-10-CM

## 2023-10-19 PROCEDURE — 97530 THERAPEUTIC ACTIVITIES: CPT

## 2023-10-19 PROCEDURE — 97112 NEUROMUSCULAR REEDUCATION: CPT

## 2023-10-19 NOTE — PROGRESS NOTES
Daily Note     Today's date: 10/19/2023  Patient name: Daniel Garcia  : 1940  MRN: [id number]  Referring provider: Rosalina Valdivia DO  Dx:   Encounter Diagnosis     ICD-10-CM    1. Balance disorder  R26.89       2. Other abnormalities of gait and mobility  R26.89             Start Time: 1420  Stop Time: 1507  Total time in clinic (min): 47 minutes    Subjective: Patient reports to PT session with no new issues or complaints. Arrived without knee pain. Arrived without dizziness. Objective: See treatment diary below:   Monitor BP pre/post starting 150/80 mmHG, L UE seated  98 PO2   68 bpm    TA:  -MSQ = 39% = severe motion sensitivity. Possible nystagmus (brief, low intensity, torsional) upon L [employer], and upon return to sit from [employer]     NMR:  - STS from standard chair to fatigue: 10 reps,  10 reps with knee pain last few reps of each set  -  LAT step overs on foam pad 10 reps haptic touch at bar      -VOR x 1 (FT, firm) x 60 seconds (2 sets) baseline 1/10 lightheaded    V: 1/10 D; 1-2/10 D, no LOB no nausea   H: 1-2/10 with nausea D; 1/10 D with nausea  - not today: Step ups (6") x 15 reps No UE for L, 1 UE for R LE.    - not today: tandem ambulation in // bars x 6 cycles down/back   - not today: AP sway (firm, EO) x 20 reps    - not today: Standing core rotations w/ small TT: 1 minute (2 sets)     TE:  -Leg press 65# x  2 sets     HEP:  -VOR x 1 seated 60 sec H and V 2 sets 3 times daily     NV BP end of session. Assessment: Patient tolerated treatment session well today with focus on vestibular adaptation, balance, and LE strength exercises. LE strengthening progressed with addition of leg press. Mild nausea provoked with VOR  x 1 horizontal, but this symptom resolved quickly. Patient reports worsening symptoms with horizontal VOR in comparison to vertical VOR. Patient tolerated all other activities well with no LOB.   Patient demonstrates significant motion sensitivity per MSQ score.  Plan to resassess positionals to screen for BPPV, as low intensity, brief torsional nystagmus was noted today. Plan to assess BP at start and end of sessions in the future to further monitor. Patient will continue to benefit from skilled OPPT services to reduce overall risk for falls and maximize function. Plan: Continue per plan of care. Follow-up with patient about speaking with doctor. Advance per primary PT.     Outcome Measures Initial Eval  9/27/23 9/28  See above       5xSTS 20.56 sec        TUG  - Regular  - Cognitive   23.38 sec  50.54 sec        10 meter defer        FGA 11/30        DGI defer        mCTSIB  - FTEO (firm)  - FTEC (firm)  - FTEO (foam)  - FTEC (foam)   30 sec  30 sec  30 sec  4.75 sec        6MWT defer        MSQ                             Precautions:   Past Medical History:   Diagnosis Date    Arthritis     osteo    Autoimmune hepatitis ([address])     Back pain with radiation     to bilat legs    De Santiago esophagus     De Santiago's esophagus     Chronic GERD     Chronic uveitis     bilateral    CPAP (continuous positive airway pressure) dependence     Diabetes mellitus (HCC)     diet controlled    Disease of thyroid gland     hypo    Diverticulitis     Fibromyalgia syndrome     Fibromyalgia, primary     Hiatal hernia     Hx of colonoscopy 2016    EGD done simultaneously    Hyperlipidemia     Hypertension     Infectious viral hepatitis     PONV (postoperative nausea and vomiting)     Sinus congestion     Sleep apnea     CPAP rx, does not use mask

## 2023-10-20 ENCOUNTER — OFFICE VISIT (OUTPATIENT)
Dept: FAMILY MEDICINE CLINIC | Facility: CLINIC | Age: 83
End: 2023-10-20
Payer: MEDICARE

## 2023-10-20 VITALS
DIASTOLIC BLOOD PRESSURE: 70 MMHG | HEIGHT: 58 IN | HEART RATE: 74 BPM | WEIGHT: 147.6 LBS | BODY MASS INDEX: 30.98 KG/M2 | SYSTOLIC BLOOD PRESSURE: 118 MMHG | TEMPERATURE: 97.5 F | RESPIRATION RATE: 16 BRPM

## 2023-10-20 DIAGNOSIS — E78.2 MIXED HYPERLIPIDEMIA: ICD-10-CM

## 2023-10-20 DIAGNOSIS — E03.9 ACQUIRED HYPOTHYROIDISM: ICD-10-CM

## 2023-10-20 DIAGNOSIS — Z23 NEED FOR INFLUENZA VACCINATION: ICD-10-CM

## 2023-10-20 DIAGNOSIS — I10 ESSENTIAL HYPERTENSION: Primary | ICD-10-CM

## 2023-10-20 PROCEDURE — G0008 ADMIN INFLUENZA VIRUS VAC: HCPCS

## 2023-10-20 PROCEDURE — 90662 IIV NO PRSV INCREASED AG IM: CPT

## 2023-10-20 PROCEDURE — 99214 OFFICE O/P EST MOD 30 MIN: CPT | Performed by: FAMILY MEDICINE

## 2023-10-20 NOTE — PROGRESS NOTES
Name: Jacinta Garcia      : 1940      MRN: [id number]  Encounter Provider: Dharmesh Paul DO  Encounter Date: 10/20/2023   Encounter department: [facility]     1. Essential hypertension  Assessment & Plan:  Blood pressure is well controlled  Continue Motifene 5 mg daily and lisinopril 40 mg daily      2. Need for influenza vaccination  -     influenza vaccine, high-dose, PF 0.7 mL (FLUZONE HIGH-DOSE)    3. Mixed hyperlipidemia  Assessment & Plan:  Stable  Continue atorvastatin 10 mg daily      4. Acquired hypothyroidism  Assessment & Plan:  Stable  Continue levothyroxine 7 5 mcg daily        Depression Screening and Follow-up Plan: Patient was screened for depression during today's encounter. They screened negative with a PHQ-2 score of 0. Return in about 3 months (around 2024) for Annual Wellness Visit. Subjective      Patient ports that she has been doing okay. She has been wearing better resting. She is worried about his worsening memory. She is tolerating her medication well. She would like her flu shot today. Review of Systems    Current Outpatient Medications on File Prior to Visit   Medication Sig   • acetaminophen (TYLENOL) 325 mg tablet Take 650 mg by mouth every 6 (six) hours as needed for mild pain.    • amLODIPine (NORVASC) 5 mg tablet TAKE ONE TABLET BY MOUTH EVERY MORNING   • atorvastatin (LIPITOR) 10 mg tablet Take 1 tablet (10 mg total) by mouth daily   • carvedilol (COREG) 12.5 mg tablet TAKE ONE TABLET BY MOUTH TWICE A DAY WITH MEALS   • carvedilol (COREG) 3.125 mg tablet Take 3.125 mg by mouth 2 (two) times a day with meals   • famotidine (PEPCID) 20 mg tablet TAKE ONE TABLET BY MOUTH TWICE A DAY   • gabapentin (Neurontin) 100 mg capsule Take 1 capsule (100 mg total) by mouth daily at bedtime   • levothyroxine 75 mcg tablet Take 1 tablet (75 mcg total) by mouth in the morning   • lisinopril (ZESTRIL) 40 mg tablet TAKE ONE TABLET BY MOUTH EVERY DAY   • polyethylene glycol (MIRALAX) 17 g packet Take 17 g by mouth every other day   • [DISCONTINUED] spironolactone (ALDACTONE) 25 mg tablet  (Patient not taking: Reported on 10/20/2023)       Objective     /70   Pulse 74   Temp 97.5 °F (36.4 °C)   Resp 16   Ht 4' 10" (1.473 m)   Wt 67 kg (147 lb 9.6 oz)   BMI 30.85 kg/m²     Physical Exam  Vitals and nursing note reviewed. Constitutional:       Appearance: She is well-developed. HENT:      Head: Normocephalic and atraumatic. Right Ear: Tympanic membrane and external ear normal.      Left Ear: Tympanic membrane and external ear normal.   Cardiovascular:      Rate and Rhythm: Normal rate and regular rhythm. Heart sounds: Normal heart sounds. No murmur heard. No friction rub. Pulmonary:      Effort: Pulmonary effort is normal. No respiratory distress. Breath sounds: Normal breath sounds. No wheezing or rales. Musculoskeletal:      Right lower leg: No edema. Left lower leg: No edema.        Michelle Rose DO

## 2023-10-24 ENCOUNTER — OFFICE VISIT (OUTPATIENT)
Facility: CLINIC | Age: 83
End: 2023-10-24
Payer: MEDICARE

## 2023-10-24 DIAGNOSIS — R26.89 OTHER ABNORMALITIES OF GAIT AND MOBILITY: ICD-10-CM

## 2023-10-24 DIAGNOSIS — R26.89 BALANCE DISORDER: Primary | ICD-10-CM

## 2023-10-24 PROCEDURE — 97112 NEUROMUSCULAR REEDUCATION: CPT

## 2023-10-24 NOTE — PROGRESS NOTES
Daily Note     Today's date: 10/24/2023  Patient name: Haritha Burton  : 1940  MRN: [id number]  Referring provider: Rahul Green DO  Dx:   Encounter Diagnosis     ICD-10-CM    1. Balance disorder  R26.89       2. Other abnormalities of gait and mobility  R26.89               Start Time: 1145  Stop Time: 1235  Total time in clinic (min): 50 minutes    Subjective: Patient reports to PT session with no new issues or complaints. Arrived without knee pain. Arrived without dizziness. Objective: See treatment diary below:     98 PO2   68 bpm    TA:  -MSQ = 39% = severe motion sensitivity. Possible nystagmus (brief, low intensity, torsional) upon L [employer], and upon return to sit from [employer]     NMR:  - STS from standard chair to fatigue: 10 reps x 2 with slight bilat knee pain rep 7 of second set. -  LAT step UPS  from foam pad 10 reps x 2 sets bilat (40 tota)  light touch at bar   - 2.5# standing hip extABD at bar x 10 bilat  - Seated trunk rotation with small TT 5.5#  -NV add habituation (mSQ 39%)  -  not today VOR x 1 (FT, firm) x 60 seconds (2 sets) baseline 1/10 lightheaded    V: 1/10 D; 1-2/10 D, no LOB no nausea   H: 1-2/10 with nausea D; 1/10 D with nausea  - not today: tandem ambulation in // bars x 6 cycles down/back   - not today: AP sway (firm, EO) x 20 reps    - not today: Standing core rotations w/ small TT: 1 minute (2 sets)     TE:  -Leg press 35# x  15 reps, end of session     HEP:  -VOR x 1 seated 60 sec H and V 2 sets 3 times daily     NV BP end of session. Assessment: Patient tolerated treatment session well today with focus on balance, and LE strength exercises. Patient demonstrates significant motion sensitivity per MSQ score last visit, and will add habituation next visit. Plan to resassess positionals to screen for BPPV, as low intensity, brief torsional nystagmus was noted ring MSQ last visit.   Plan to assess BP at start and end of sessions in the future to further monitor. Patient will continue to benefit from skilled OPPT services to reduce overall risk for falls and maximize function. Plan: Continue per plan of care. Follow-up with patient about speaking with doctor. Advance per primary PT.     Outcome Measures Initial Eval  9/27/23 9/28  See above       5xSTS 20.56 sec        TUG  - Regular  - Cognitive   23.38 sec  50.54 sec        10 meter defer        FGA 11/30        DGI defer        mCTSIB  - FTEO (firm)  - FTEC (firm)  - FTEO (foam)  - FTEC (foam)   30 sec  30 sec  30 sec  4.75 sec        6MWT defer        MSQ                             Precautions:   Past Medical History:   Diagnosis Date    Arthritis     osteo    Autoimmune hepatitis ([address])     Back pain with radiation     to bilat legs    De Santiago esophagus     De Santiago's esophagus     Chronic GERD     Chronic uveitis     bilateral    CPAP (continuous positive airway pressure) dependence     Diabetes mellitus (HCC)     diet controlled    Disease of thyroid gland     hypo    Diverticulitis     Fibromyalgia syndrome     Fibromyalgia, primary     Hiatal hernia     Hx of colonoscopy 2016    EGD done simultaneously    Hyperlipidemia     Hypertension     Infectious viral hepatitis     PONV (postoperative nausea and vomiting)     Sinus congestion     Sleep apnea     CPAP rx, does not use mask

## 2023-10-26 ENCOUNTER — OFFICE VISIT (OUTPATIENT)
Facility: CLINIC | Age: 83
End: 2023-10-26
Payer: MEDICARE

## 2023-10-26 DIAGNOSIS — R26.89 BALANCE DISORDER: Primary | ICD-10-CM

## 2023-10-26 DIAGNOSIS — R26.89 OTHER ABNORMALITIES OF GAIT AND MOBILITY: ICD-10-CM

## 2023-10-26 PROCEDURE — 97112 NEUROMUSCULAR REEDUCATION: CPT

## 2023-10-26 NOTE — PROGRESS NOTES
Daily Note     Today's date: 10/26/2023  Patient name: Shayy Szymanski  : 1940  MRN: [id number]  Referring provider: Anna Vela DO  Dx:   Encounter Diagnosis     ICD-10-CM    1. Balance disorder  R26.89       2. Other abnormalities of gait and mobility  R26.89           Start Time: 1415  Stop Time: 1505  Total time in clinic (min): 50 minutes    Subjective: Patient reports to PT session with no new issues or complaints. Arrived without knee pain. Arrived without dizziness. Primary complaint is veering with gait and difficulty walking carrying something with 2 hands. Objective: See treatment diary below:   NV PRE and POST vitals    NMR:  - STS from standard chair to fatigue: 12 reps  and 10 reps,  then later 12 reps   - 2.5 LAT step UPS 6" step 2 UE 10 reps   - 2.5# standing hip extABD at bar x 10 bilat 2 s hold   -walk and hold TT 80 feet  - Seated trunk rotation with small TT 5.5# 10 reps bilat   -seated flexion upright habituation 6 x bilat Symptoms R>L lightheadness  -Step taps at 14" step (side of  steps)  no UE 20 reps 2 LOB    -  not today VOR x 1 (FT, firm) x 60 seconds (2 sets) baseline 1/10 lightheaded    V: 1/10 D; 1-2/10 D, no LOB no nausea   H: 1-2/10 with nausea D; 1/10 D with nausea  - not today: tandem ambulation in // bars x 6 cycles down/back   - not today: AP sway (firm, EO) x 20 reps    - not today: Standing core rotations w/ small TT: 1 minute (2 sets)   TIMMY Taveras     TE:  -not today: Leg press 35# x  15 reps, end of session     HEP:  -VOR x 1 seated 60 sec H and V 2 sets 3 times daily     Assessment: Patient tolerated treatment session well today with focus on balance, and LE strength exercises. Habituation added due to motion sensitivity with position changes. Plan to assess BP at start and end of sessions in the future to further monitor. Patient will continue to benefit from skilled OPPT services to reduce overall risk for falls and maximize function.       Plan: Continue per plan of care. Follow-up with patient about speaking with doctor.      Outcome Measures Initial Eval  9/27/23 9/28  See above       5xSTS 20.56 sec        TUG  - Regular  - Cognitive   23.38 sec  50.54 sec        10 meter defer        FGA 11/30        DGI defer        mCTSIB  - FTEO (firm)  - FTEC (firm)  - FTEO (foam)  - FTEC (foam)   30 sec  30 sec  30 sec  4.75 sec        6MWT defer        MSQ   39%                          Precautions:   Past Medical History:   Diagnosis Date    Arthritis     osteo    Autoimmune hepatitis ([address])     Back pain with radiation     to bilat legs    De Santiago esophagus     De Santiago's esophagus     Chronic GERD     Chronic uveitis     bilateral    CPAP (continuous positive airway pressure) dependence     Diabetes mellitus (HCC)     diet controlled    Disease of thyroid gland     hypo    Diverticulitis     Fibromyalgia syndrome     Fibromyalgia, primary     Hiatal hernia     Hx of colonoscopy 2016    EGD done simultaneously    Hyperlipidemia     Hypertension     Infectious viral hepatitis     PONV (postoperative nausea and vomiting)     Sinus congestion     Sleep apnea     CPAP rx, does not use mask

## 2023-10-31 ENCOUNTER — OFFICE VISIT (OUTPATIENT)
Facility: CLINIC | Age: 83
End: 2023-10-31
Payer: MEDICARE

## 2023-10-31 DIAGNOSIS — R26.89 OTHER ABNORMALITIES OF GAIT AND MOBILITY: ICD-10-CM

## 2023-10-31 DIAGNOSIS — R26.89 BALANCE DISORDER: Primary | ICD-10-CM

## 2023-10-31 PROCEDURE — 97112 NEUROMUSCULAR REEDUCATION: CPT

## 2023-10-31 NOTE — PROGRESS NOTES
Daily Note     Today's date: 10/31/2023  Patient name: Gonzalez Starr  : 1940  MRN: [id number]  Referring provider: Vishal Sosa DO  Dx:   Encounter Diagnosis     ICD-10-CM    1. Balance disorder  R26.89       2. Other abnormalities of gait and mobility  R26.89             Start Time: 1100  Stop Time: 1145  Total time in clinic (min): 45 minutes    Subjective: Patient reports to PT session with no new issues or complaints. Arrived without knee pain. Arrived without dizziness. Primary complaint is veering with gait and difficulty walking carrying something with 2 hands. Objective: See treatment diary below:   NV PRE and POST vitals    NMR:  - STS from standard chair to fatigue: 12 reps  and 10 reps,  then later 12 reps   - 2.5 # FWD/LAT step UPS 6" step 1 UE 12 reps, 48 total   - 2.5# standing hip extABD at bar x 1 bilat 2s hold  12 reps each side   - *Step taps at 14" step (side of  steps)  no UE 20 reps 2 LOB    HEP:  -VOR x 1 seated 60 sec H and V 2 sets 3 times daily     Assessment: Patient tolerated treatment session well today with focus on balance, and LE strength exercises. Habituation added due to motion sensitivity with position changes. Plan to assess BP at start and end of sessions in the future to further monitor. Patient will continue to benefit from skilled OPPT services to reduce overall risk for falls and maximize function. Plan: Continue per plan of care.       Outcome Measures Initial Eval  23  See above       5xSTS 20.56 sec        TUG  - Regular  - Cognitive   23.38 sec  50.54 sec        10 meter defer        FGA         DGI defer        mCTSIB  - FTEO (firm)  - FTEC (firm)  - FTEO (foam)  - FTEC (foam)   30 sec  30 sec  30 sec  4.75 sec        6MWT defer        MSQ   39%                          Precautions:   Past Medical History:   Diagnosis Date    Arthritis     osteo    Autoimmune hepatitis ([address])     Back pain with radiation     to bilat legs De Santiago esophagus     De Santiago's esophagus     Chronic GERD     Chronic uveitis     bilateral    CPAP (continuous positive airway pressure) dependence     Diabetes mellitus (HCC)     diet controlled    Disease of thyroid gland     hypo    Diverticulitis     Fibromyalgia syndrome     Fibromyalgia, primary     Hiatal hernia     Hx of colonoscopy 2016    EGD done simultaneously    Hyperlipidemia     Hypertension     Infectious viral hepatitis     PONV (postoperative nausea and vomiting)     Sinus congestion     Sleep apnea     CPAP rx, does not use mask

## 2023-11-02 ENCOUNTER — APPOINTMENT (OUTPATIENT)
Facility: CLINIC | Age: 83
End: 2023-11-02
Payer: MEDICARE

## 2023-11-02 ENCOUNTER — EVALUATION (OUTPATIENT)
Facility: CLINIC | Age: 83
End: 2023-11-02
Payer: MEDICARE

## 2023-11-02 DIAGNOSIS — R26.89 OTHER ABNORMALITIES OF GAIT AND MOBILITY: ICD-10-CM

## 2023-11-02 DIAGNOSIS — R26.89 BALANCE DISORDER: Primary | ICD-10-CM

## 2023-11-02 PROCEDURE — 97530 THERAPEUTIC ACTIVITIES: CPT

## 2023-11-02 NOTE — PROGRESS NOTES
PT Evaluation  and PT Re-Evaluation          POC expires Auth Status Total   Visits  Start date  Expiration date PT/OT + Visit Limit? Co-Insurance   23 N/A N/A N/A N/A PT, MAJO No and $0 Co-pay                                                  Today's date: 2023  Patient name: Dion Cooks  : 1940  MRN: [id number]  Referring provider: Ozzie Steinberg DO  Dx:   Encounter Diagnosis     ICD-10-CM    1. Balance disorder  R26.89       2. Other abnormalities of gait and mobility  R26.89             Assessment  UPDATED 23: William Austin has attended PT for the past month for the diagnoses above. She reports feeling more agile since starting PT. She continues to report dizziness and has tested (+) for orthostatic hypotension, and displays significant motion sensitivity. Objective measures:  5XSTS improved from 20 to 14.5 seconds improved by more than the MDC 2.3 sec. (14.8 sec for age/gender norm.)  TUG improved from 23 to 12 seconds, more than 4.4 MDC and better than 13.5 safety cutoff. TUG cog improved from 50 to 18 seconds. FGA improved from 11 to 20/30. She remains high fall risk. She continues to demonstrate significant veering during gait with horizontal head turns. She must slow prior to stepping over a 6" obstacles. Bales score 46/56 indicates she remains high fall risk. Goal completion:  Patient has met several  STG goals  and 1 LTG with progress towards additional goals, at this time. Recommendations:    Advise continued PT per POC, to continue vestibular therapy with habituation, and dynamic gait/balance training to improve mobility to safe level in the community. William Austin is in aggreement with this plan. Plan for 6MWT next visit. Initial PT Assessment details: Patient is a 79 y.o.  Female who presents to skilled outpatient PT with imbalance, dizziness and difficulty with stair negotiation affecting her functional indpendence and ability to participate in her community . Patient's past medical history significant for falls, controlled HTN, and substantial surgical history. She demonstrates deficits in muscular strength, ambulatory safety, dual tasking, dynamic and static balance as per scores on 5x STS, TUG and TUG cog, FGA, and mCTSIB respectively. As per APTA and Rehab Measure cuttoff scores, Kizzy Herrera categorizes at a HIGH risk for falls with respect to the aforementioned outcome measures. Importantly, patient with occurrence of subjective dizziness symptoms following position changes (supine to stand,) ambulation with head turns, backwards walking and activities where vision was occluded. This could indicate a vestibular system dysfunction, plan to perform oculomotor and cervical screen to assess possible vestibular system involvement, as well as screen for orthostatic hypotension. Patient demonstrates high reliance on visual system for dynamic and static balance activities further perpetuating her risk for falls. Unable to assess gait speed and cardiovascular endurance today due to time constraints, plan to assess in future session to appropriate educate on use of assisted device while ambulating in her community. She will benefit from skilled PT to target balance deficits to improve patient overall function, decrease risk for fall to promote independence and participation in her community. Impairments: Abnormal coordination, Abnormal gait, Abnormal muscle tone, Abnormal or restricted ROM, Activity intolerance, Impaired balance, Impaired physical strength, Lacks appropriate HEP, Poor posture, Poor body mechanics, Pain with function, Safety issue, Weight-bearing intolerance, Abnormal movement, Difficulty understanding, Abnormal muscle firing  Understanding of Dx/Px/POC: Good  Prognosis: Good    Patient verbalized understanding of POC. Please contact me if you have any questions or recommendations.  Thank you for the referral and the opportunity to share in [facility].         Plan  Plan details: balance training, strengthening, vestibular training  Patient would benefit from: Skilled PT  Planned modality interventions: Biofeedback, Cryotherapy, TENS, Thermotherapy  Planned therapy interventions: Abdominal trunk stabilization, ADL training, Balance, Balance/WB training, Breathing training, Body mechanics training, Coordination, Functional ROM exercises, Gait training, HEP, Joint Mobilization, Manual Therapy, Felix taping, Motor coordination training, Neuromuscular re-education, Patient education, Postural training, Strengthening, Stretching, Therapeutic activities, Therapeutic exercises, Therapeutic training, Transfer training, Activity modification, Work reintegration  Frequency: 2x/wk  Duration in weeks: 12  Plan of Care beginning date: 9/27/23  Plan of Care expiration date: 12 weeks - 12/20/2023  Treatment plan discussed with: Patient and Family       Goals  Short Term Goals (4 weeks):    - Patient will improve time on TUG by 2.9 seconds to facilitate improved safety in all ambulation MET   - Patient will be independent in basic HEP 2-3 weeks   - Patient will improve 5xSTS score by 2.3 seconds to promote improved LE functional strength needed for ADLs MET   - Patient will complete components of HiMAT to promote agility necessary for sports related tasks N/A     Long Term Goals (12 weeks):  - Patient will be independent in a comprehensive home exercise program  - Patient will improve scoring on DGI by 2.6 points to progress safety  - Patient will improve gait speed by 0.18 m/s to improve safety with community ambulation  - Patient will improve CARTER by 6 points in order to improve static balance and reduce risk for falls  - Patient will improve scoring on FGA by 4 points to progress safety with dynamic tasks MET   - Patient will be able to demonstrate HT in gait without veering  - Patient will improve 6 Minute Walk Test score by 190 feet to promote improved cardiovascular endurance  - Patient will report 50% reduction in near falls in order to improve safety with functional tasks and reduce his risk for falls  - Patient will report going on walks at least 3 days per week to promote independence and improved cardiovascular endurance  - Patient will be able to ascend/descend stairs reciprocally with 1 UE assist to promote independence and safety with ADLs  - Patient will report 50% reduction in near falls when ambulating on uneven terrain      Cut off score    All date taken from APTA Neuro Section or Rehab Measures      Bales/58  MDC: 6 pts  Age Norms:  57-79: M - 54   F - 55  70-79: M - 47   F - 53  80-89: M - 48   F - 50 5xSTS: Mallory et al 2010  MDC: 2.3 sec  Age Norms:  60-69: 11.1 sec  70-79: 12.6 sec  80-89: 14.8 sec   TUG  MDC: 4.14 sec  Cut off score:  >13.5 sec community dwelling adults  >32.2 frail elderly  <20 I for basic transfers  >30 dependent on transfers 10 Meter Walk Test: Kin chapin 2011  MDC: 0.18 m/s  20-29: M - 1.35 m   F - 1.34 m  30-39: M - 1.43 m   F - 1.34 m  40-49: M - 1.43 m   F - 1.39 m  50-59: M - 1.43 m   F - 1.31 m  60-69: M - 1.34 m   F - 1.24 m  70-79: M - 1.26 m   F - 1.13 m  80-89: M - 0.97 m   F - 0.94 m    Household Ambulator < 0.4 m/s  Limited Community Ambulator 0.4 - 0.8 m/s  [employer] Ambulator 0.8 - 1.2 m/s  Safely cross the street > 1.2 m/s   FGA  MCID: 4 pts  Geriatrics/community < 22/30 fall risk  Geriatrics/community < 20/30 unexplained falls    DGI  MDC: vestibular - 4 pts  MDC: geriatric/community - 3 pts  Falls risk <19/24 mCTSIB  Norm: 20-60 yrs  Eyes open firm: norm sway 0.21-0.48  Eyes closed firm: norm sway 0.48-0.99  Eyes open foam: norm sway 0.38-0.71  Eyes closed foam: norm sway 0.70-2.22   6 Minute Walk Test  MDC: 190.98 ft  MCID: 164 ft    Age Norms  60-69: M - 1876 ft (571.80 m)  F - 1765 ft (537.98 m)  70-79: M - 1729 ft (527.00 m)  F - 1545 ft (470.92 m)  80-89: M - 3473 ft (416.97 m)  F - 1286 ft (391.97 m) ABC: [address], 2003  <67% increased risk for falls         Subjective    Updated 23: Patient reports feeling more agile since starting PT one month ago. Initial assessment:History of Present Illness  - Mechanism of injury: Patient is an 79year old female presenting to PT with complaints of difficulty climbing stairs, imbalance and history of falls. Past medical history is significant for controlled HTN, L hip pain, and substantial surgical history. She reports she's only had one recent fall (2023) which occurred because she missed a step, lost her balance anteriorly and landed on her R hand and face. She is currently attending hand therapy for subsequent fracture in R hand. [facility] reports feelings of dizziness described as "lightheaded and floaty" that accompanies imbalance especially when waking up in the middle of the night and walking in the dark. She reports dizziness does not last longer than a couple of seconds and denies any falls proceeded by described dizzines. Patient does not currently ambulate with an AD but her daughter has purchased her a SPC due to her unsteadiness.      - Primary AD: daughter bought her a cane recently, no ad at home or in community.   - Assist level at home: independent  - Decreased fine motor tasks: No      Pain  - Current pain ratin/10  - At best pain ratin/10  - At worst pain ratin-7/10  - Location: L buttock, hip  - Aggravating factors: standing, morning    Social Support  - Steps to enter house: none, 1 garage steps  - Stairs in house: no steps, ranch style house  - Lives in: ranch style house  - Lives with:  and daughter visits occasionally    - Employment status: retired,  for [employer]  - Hand dominance: R handed    Treatments  - Previous treatment: Hand therapy, ortho PT for shoulder   - Current treatment: Hand therapy  - Diagnostic Testing: x-ray from fall of hand.        Objective     LE MMT  - R Hip Flexion: 3+/5  L Hip Flexion: 4-/5  - R Hip Extension: 4/5  L Hip Extension: 4/5  - R Hip Abduction: 4/5  L Hip Abduction: 4/5  - R Hip Adduction: 4-/5  L Hip Adduction: 4+/5  - R Knee Extension: 4/5 (pain in R quad)  L Knee Extension: 5/5  - R Knee Flexion: 5/5  L Knee Flexion: 5/5  - R Ankle DF: 4-/5   L Ankle DF: 4/5  - R Ankle PF: 5/5   L Ankle PF: 5/5    Sensation  - Light touch: normla, B/L numbness at lateral aspects of shins    Coordination  - Heel to Shin: Normal, weakness evident  - Alternate Toe Taps: normal  - Finger to Nose: normal    Myelopathy Screen (>3/5 +)  - Lowry's Reflex: -  - Inverted Supinator Sign: -  - Age > 45: Yes  - Gait Deviation: No      Gait  - Abnormalities: decreased gait speed, decreased trunk rotation, decreased B/L foot clearance           Outcome Measures Initial Eval  9/27/23 PN  11/2/23       5xSTS 20.56 sec 14.5 s       TUG  - Regular  - Cognitive   23.38 sec  50.54 sec   -12.31  -18       10 meter defer 12.06 sec       FGA 11/30 20/30       DGI defer        mCTSIB  - FTEO (firm)  - FTEC (firm)  - FTEO (foam)  - FTEC (foam)   30 sec  30 sec  30 sec  4.75 sec     NT        6MWT defer NT        Bales   46/56                           Precautions:   Past Medical History:   Diagnosis Date    Arthritis     osteo    Autoimmune hepatitis ([address])     Back pain with radiation     to bilat legs    De Santiago esophagus     De Santiago's esophagus     Chronic GERD     Chronic uveitis     bilateral    CPAP (continuous positive airway pressure) dependence     Diabetes mellitus (HCC)     diet controlled    Disease of thyroid gland     hypo    Diverticulitis     Fibromyalgia syndrome     Fibromyalgia, primary     Fractures     Hiatal hernia     Hx of colonoscopy 2016    EGD done simultaneously    Hyperlipidemia     Hypertension     Infectious viral hepatitis     Osteoarthritis     PONV (postoperative nausea and vomiting)     Sinus congestion     Sleep apnea     CPAP rx, does not use mask

## 2023-11-03 ENCOUNTER — APPOINTMENT (OUTPATIENT)
Facility: CLINIC | Age: 83
End: 2023-11-03
Payer: MEDICARE

## 2023-11-03 DIAGNOSIS — E03.9 ACQUIRED HYPOTHYROIDISM: ICD-10-CM

## 2023-11-03 RX ORDER — LEVOTHYROXINE SODIUM 0.07 MG/1
75 TABLET ORAL EVERY MORNING
Qty: 90 TABLET | Refills: 1 | Status: SHIPPED | OUTPATIENT
Start: 2023-11-03

## 2023-11-07 ENCOUNTER — OFFICE VISIT (OUTPATIENT)
Facility: CLINIC | Age: 83
End: 2023-11-07
Payer: MEDICARE

## 2023-11-07 DIAGNOSIS — R26.89 OTHER ABNORMALITIES OF GAIT AND MOBILITY: ICD-10-CM

## 2023-11-07 DIAGNOSIS — R26.89 BALANCE DISORDER: Primary | ICD-10-CM

## 2023-11-07 PROCEDURE — 97112 NEUROMUSCULAR REEDUCATION: CPT | Performed by: PHYSICAL THERAPIST

## 2023-11-07 NOTE — PROGRESS NOTES
Daily Note     Today's date: 2023  Patient name: Dorothe Rinne  : 1940  MRN: [id number]  Referring provider: Aguilar Hagen DO  Dx:   Encounter Diagnosis     ICD-10-CM    1. Balance disorder  R26.89       2. Other abnormalities of gait and mobility  R26.89                        Subjective: Patient reports to PT session with no new issues or complaints. Objective: See treatment diary below:     BP: 126/68    NMR:  - STS from standard chair to fatigue: 10, 7, 10 reps  - 2.5 # FWD/LAT step UPS 6" step 1 UE, 20x ea  - Seated VOR Cx: 60 sec, H- 3/10 D, V- 2/10 D (2 sets ea)  - Standing VOR x 1 60 sec H- 3/10 D, V- 2/10 D (2 sets ea)    HEP:  -VOR x 1 seated 60 sec H and V 2 sets 3 times daily     Assessment: Patient tolerated treatment well. During STS, patient reports increased LE fatigue and knee pain limiting her number of reps. VOR x1 completed in standing today with minimal postural sway observed however patient does report increased dizziness up to 3/10 that reduces with rest break. Patient will continue to benefit from skilled OPPT services to reduce overall risk for falls and maximize function. Plan: Continue per plan of care.       Outcome Measures Initial Eval  23  See above       5xSTS 20.56 sec        TUG  - Regular  - Cognitive   23.38 sec  50.54 sec        10 meter defer        FGA         DGI defer        mCTSIB  - FTEO (firm)  - FTEC (firm)  - FTEO (foam)  - FTEC (foam)   30 sec  30 sec  30 sec  4.75 sec        6MWT defer        MSQ   39%                          Precautions:   Past Medical History:   Diagnosis Date    Arthritis     osteo    Autoimmune hepatitis ([address])     Back pain with radiation     to bilat legs    De Santiago esophagus     De Santiago's esophagus     Chronic GERD     Chronic uveitis     bilateral    CPAP (continuous positive airway pressure) dependence     Diabetes mellitus (HCC)     diet controlled    Disease of thyroid gland     hypo    Diverticulitis Fibromyalgia syndrome     Fibromyalgia, primary     Hiatal hernia     Hx of colonoscopy 2016    EGD done simultaneously    Hyperlipidemia     Hypertension     Infectious viral hepatitis     PONV (postoperative nausea and vomiting)     Sinus congestion     Sleep apnea     CPAP rx, does not use mask

## 2023-11-09 ENCOUNTER — OFFICE VISIT (OUTPATIENT)
Facility: CLINIC | Age: 83
End: 2023-11-09
Payer: MEDICARE

## 2023-11-09 DIAGNOSIS — R26.89 OTHER ABNORMALITIES OF GAIT AND MOBILITY: ICD-10-CM

## 2023-11-09 DIAGNOSIS — R26.89 BALANCE DISORDER: Primary | ICD-10-CM

## 2023-11-09 PROCEDURE — 97112 NEUROMUSCULAR REEDUCATION: CPT

## 2023-11-09 NOTE — PROGRESS NOTES
Daily Note     Today's date: 2023  Patient name: Helene Carpio  : 1940  MRN: [id number]  Referring provider: Sohail Hollingsworth DO  Dx:   Encounter Diagnosis     ICD-10-CM    1. Balance disorder  R26.89       2. Other abnormalities of gait and mobility  R26.89                        Subjective: Patient reports to PT session with reports of fatigue secondary to shingles shot yesterday. She also reports a 3/10 HA. Objective: See treatment diary below:     BP: 119/61 mmHg    NMR:  - STS from standard chair to fatigue: 12 reps   - Step ups 4" and 8": 8 reps   - Standing VOR x 1 60 sec H- 4/10 D, V- 2/10 D (2 sets ea)  - Ambulation with horizontal and vertical head turns: 50ft x 4  - Ambulation/BWD walking with R/L turns    HEP:  -VOR x 1 seated 60 sec H and V 2 sets 3 times daily     Assessment: Patient tolerated treatment fairly today. She able to progress to standing VOR x1 today and demonstrated some increase in dizziness which is consistent with mild vestibular hypofunction. Minimal sway with horizontal and vertical head turns + improvement with dizziness symptoms with whole body turns. Incorporated step ups secondary to reports of difficulty with curb negotiation in which she illustrated difficulty with push-off. This can be attributed to LE weakness and decreased proprioception. Patient will continue to benefit from skilled OPPT services to reduce overall risk for falls and maximize function. Plan: Continue per plan of care.       Outcome Measures Initial Eval  23  See above       5xSTS 20.56 sec        TUG  - Regular  - Cognitive   23.38 sec  50.54 sec        10 meter defer        FGA         DGI defer        mCTSIB  - FTEO (firm)  - FTEC (firm)  - FTEO (foam)  - FTEC (foam)   30 sec  30 sec  30 sec  4.75 sec        6MWT defer        MSQ   39%                          Precautions:   Past Medical History:   Diagnosis Date    Arthritis     osteo    Autoimmune hepatitis ([address])     Back pain with radiation     to bilat legs    De Santiago esophagus     De Santiago's esophagus     Chronic GERD     Chronic uveitis     bilateral    CPAP (continuous positive airway pressure) dependence     Diabetes mellitus (HCC)     diet controlled    Disease of thyroid gland     hypo    Diverticulitis     Fibromyalgia syndrome     Fibromyalgia, primary     Hiatal hernia     Hx of colonoscopy 2016    EGD done simultaneously    Hyperlipidemia     Hypertension     Infectious viral hepatitis     PONV (postoperative nausea and vomiting)     Sinus congestion     Sleep apnea     CPAP rx, does not use mask

## 2023-11-14 ENCOUNTER — OFFICE VISIT (OUTPATIENT)
Facility: CLINIC | Age: 83
End: 2023-11-14
Payer: MEDICARE

## 2023-11-14 DIAGNOSIS — R26.89 OTHER ABNORMALITIES OF GAIT AND MOBILITY: ICD-10-CM

## 2023-11-14 DIAGNOSIS — R26.89 BALANCE DISORDER: Primary | ICD-10-CM

## 2023-11-14 PROCEDURE — 97112 NEUROMUSCULAR REEDUCATION: CPT

## 2023-11-14 NOTE — PROGRESS NOTES
Daily Note     Today's date: 2023  Patient name: Helene Carpio  : 1940  MRN: [id number]  Referring provider: Sohail Hollingsworth DO  Dx:   Encounter Diagnosis     ICD-10-CM    1. Balance disorder  R26.89       2. Other abnormalities of gait and mobility  R26.89             Start Time: 1330  Stop Time: 1415  Total time in clinic (min): 45 minutes    Subjective: Patient reports to PT session with reports of fatigue secondary to shingles shot yesterday. She also reports a 3/10 HA. Objective: See treatment diary below:     BP: not measured mmHg    NMR:  - Biodex: static, no UE  LOS small 24%, med 30%. Maze: small 10%, med 29%  - chair poses 10 s hold x 5  - Step ups 4" 10 reps each leg, no knee pain   - STS from standard chair to fatigue: 13 reps to fatigue (after step ups)   - Standing on FOAM VOR x 1 75 sec H- 4/10 D, V- 4/10 D and nausea 3/10 for 1 sec (2 sets ea)  -step for/rev from 1 foam beam to the other 8 reps each lead leg  - Ambulation with horizontal and vertical head turns: 50ft x 4  - not today: Ambulation/BWD walking with R/L turns    HEP:  -VOR x 1 seated 60 sec H and V 2 sets 3 times daily     Assessment: Patient tolerated treatment fairly today. Balance challenges progressed with addition of Biodex for sway practice. She able to progress to standing VOR x1 today to foam and demonstrated some increase in dizziness/nausea which is consistent with mild vestibular hypofunction. Minimal sway with horizontal and vertical head turns. Reduced challenge with  step ups to maintain good techniquw. Pt continues to report difficulty with curb negotiation. This can be attributed to LE weakness and decreased proprioception. Patient will continue to benefit from skilled OPPT services to reduce overall risk for falls and maximize function. Plan: Continue per plan of care.     HEP:  -Vor x 1  -added chair poses 23    Outcome Measures Initial Eval  23  See above       5xSTS 20.56 sec        TUG  - Regular  - Cognitive   23.38 sec  50.54 sec        10 meter defer        FGA 11/30        DGI defer        mCTSIB  - FTEO (firm)  - FTEC (firm)  - FTEO (foam)  - FTEC (foam)   30 sec  30 sec  30 sec  4.75 sec        6MWT defer        MSQ   39%                          Precautions:   Past Medical History:   Diagnosis Date    Arthritis     osteo    Autoimmune hepatitis ([address])     Back pain with radiation     to bilat legs    De Santiago esophagus     De Santiago's esophagus     Chronic GERD     Chronic uveitis     bilateral    CPAP (continuous positive airway pressure) dependence     Diabetes mellitus (HCC)     diet controlled    Disease of thyroid gland     hypo    Diverticulitis     Fibromyalgia syndrome     Fibromyalgia, primary     Hiatal hernia     Hx of colonoscopy 2016    EGD done simultaneously    Hyperlipidemia     Hypertension     Infectious viral hepatitis     PONV (postoperative nausea and vomiting)     Sinus congestion     Sleep apnea     CPAP rx, does not use mask

## 2023-11-16 ENCOUNTER — OFFICE VISIT (OUTPATIENT)
Facility: CLINIC | Age: 83
End: 2023-11-16
Payer: MEDICARE

## 2023-11-16 DIAGNOSIS — R26.89 BALANCE DISORDER: Primary | ICD-10-CM

## 2023-11-16 DIAGNOSIS — R26.89 OTHER ABNORMALITIES OF GAIT AND MOBILITY: ICD-10-CM

## 2023-11-16 PROCEDURE — 97112 NEUROMUSCULAR REEDUCATION: CPT | Performed by: PHYSICAL THERAPIST

## 2023-11-16 NOTE — PROGRESS NOTES
Daily Note     Today's date: 2023  Patient name: Osman Marmolejo  : 1940  MRN: [id number]  Referring provider: Rafy Brush DO  Dx:   Encounter Diagnosis     ICD-10-CM    1. Balance disorder  R26.89       2. Other abnormalities of gait and mobility  R26.89               Subjective: Patient reports to PT session with no changes since her last session      Objective: See treatment diary below:       NMR:  - Biodex: static, no UE  LOS small 20%, med 8%. Maze: med (unable to complete), med -75%  - Step ups 4" 10 reps each leg w/ TT  - STS from standard chair to fatigue:  reps to fatigue   - Tandem ambulation: 5 laps x 10 ft  - Sidestepping w/ horizontal head turns: 6 laps x 10 ft  - FWD/BWD ambulation w/ vertical head turns: 10 laps x 10 ft      HEP:  -VOR x 1 seated 60 sec H and V 2 sets 3 times daily     Assessment: Patient tolerated treatment fairly today. Balance challenges progressed with addition of Biodex for sway practice demonstrating increased difficulty when moving posteriorly due to fear of falling. With addition of tidal tank patient able to complete step ups without UE assist or increase in knee pain. Patient will continue to benefit from skilled OPPT services to reduce overall risk for falls and maximize function. Plan: Continue per plan of care.     HEP:  -Vor x 1  -added chair poses 23    Outcome Measures Initial Eval  23  See above       5xSTS 20.56 sec        TUG  - Regular  - Cognitive   23.38 sec  50.54 sec        10 meter defer        FGA         DGI defer        mCTSIB  - FTEO (firm)  - FTEC (firm)  - FTEO (foam)  - FTEC (foam)   30 sec  30 sec  30 sec  4.75 sec        6MWT defer        MSQ   39%                          Precautions:   Past Medical History:   Diagnosis Date    Arthritis     osteo    Autoimmune hepatitis ([address])     Back pain with radiation     to bilat legs    De Santiago esophagus     De Santiago's esophagus     Chronic GERD     Chronic uveitis bilateral    CPAP (continuous positive airway pressure) dependence     Diabetes mellitus (HCC)     diet controlled    Disease of thyroid gland     hypo    Diverticulitis     Fibromyalgia syndrome     Fibromyalgia, primary     Hiatal hernia     Hx of colonoscopy 2016    EGD done simultaneously    Hyperlipidemia     Hypertension     Infectious viral hepatitis     PONV (postoperative nausea and vomiting)     Sinus congestion     Sleep apnea     CPAP rx, does not use mask

## 2023-11-18 DIAGNOSIS — I10 ESSENTIAL HYPERTENSION: ICD-10-CM

## 2023-11-20 RX ORDER — LISINOPRIL 40 MG/1
TABLET ORAL
Qty: 90 TABLET | Refills: 1 | Status: SHIPPED | OUTPATIENT
Start: 2023-11-20

## 2023-11-21 ENCOUNTER — OFFICE VISIT (OUTPATIENT)
Facility: CLINIC | Age: 83
End: 2023-11-21
Payer: MEDICARE

## 2023-11-21 DIAGNOSIS — R26.89 OTHER ABNORMALITIES OF GAIT AND MOBILITY: ICD-10-CM

## 2023-11-21 DIAGNOSIS — R26.89 BALANCE DISORDER: Primary | ICD-10-CM

## 2023-11-21 PROCEDURE — 97112 NEUROMUSCULAR REEDUCATION: CPT

## 2023-11-21 NOTE — PROGRESS NOTES
Daily Note     Today's date: 2023  Patient name: Ben Wilson  : 1940  MRN: [id number]  Referring provider: Aleks Tello DO  Dx:   Encounter Diagnosis     ICD-10-CM    1. Balance disorder  R26.89       2. Other abnormalities of gait and mobility  R26.89                 Subjective: Patient reports to PT session with no changes since her last session. Arrives with no knee pain. She continues to feel unsteady. Objective: See treatment diary below:       Positionals: motion sensitive with bilat roll tests and L DHP without nystagmus in room light. NMR:  - Biodex: static, no UE  LOS small 34%, med 26%. Maze: med -24%, med 30%  - STS from standard chair to fatigue: 13 reps to fatigue   -Alcala's: upon return from L side pt reports brief spinning  - L Epley performed x 3 with brief mild spin reported with position changes, and upon sitting up at end especially first rep    None below today:   - Step ups 4" 10 reps each leg w/ TT  - Tandem ambulation: 5 laps x 10 ft  - Sidestepping w/ horizontal head turns: 6 laps x 10 ft  - FWD/BWD ambulation w/ vertical head turns: 10 laps x 10 ft    HEP:  -VOR x 1 seated 60 sec H and V 2 sets 3 times daily     Assessment: Patient tolerated treatment fairly today. Slowly improving Biodex weight shifting scores. Spinning noted during Alcala's on L side. Dizziness with cervical flexion in supine. Mild dizziness with roll tests bilaterally. Mild dizziness with L DHP, no dizziness with R DHP. Eply maneuvers initiated. May need to continue to reassess (BPPV vs pure motion sensitivity.) Consider positionals with goggles. Consider JPET testing. Patient will continue to benefit from skilled OPPT services to reduce overall risk for falls and maximize function. Plan: Continue per plan of care. Continue positional reassessment. Patient with possible subjective BPPV. Consider JPET testing, and positionals with goggles.     HEP.   -Vor x 1  -added chair poses 11/14/23    Outcome Measures Initial Eval  9/27/23 9/28  See above       5xSTS 20.56 sec        TUG  - Regular  - Cognitive   23.38 sec  50.54 sec        10 meter defer        FGA 11/30        DGI defer        mCTSIB  - FTEO (firm)  - FTEC (firm)  - FTEO (foam)  - FTEC (foam)   30 sec  30 sec  30 sec  4.75 sec        6MWT defer        MSQ   39%                          Precautions:   Past Medical History:   Diagnosis Date    Arthritis     osteo    Autoimmune hepatitis ([address])     Back pain with radiation     to bilat legs    De Santiago esophagus     De Santiago's esophagus     Chronic GERD     Chronic uveitis     bilateral    CPAP (continuous positive airway pressure) dependence     Diabetes mellitus (HCC)     diet controlled    Disease of thyroid gland     hypo    Diverticulitis     Fibromyalgia syndrome     Fibromyalgia, primary     Hiatal hernia     Hx of colonoscopy 2016    EGD done simultaneously    Hyperlipidemia     Hypertension     Infectious viral hepatitis     PONV (postoperative nausea and vomiting)     Sinus congestion     Sleep apnea     CPAP rx, does not use mask

## 2023-11-28 ENCOUNTER — OFFICE VISIT (OUTPATIENT)
Facility: CLINIC | Age: 83
End: 2023-11-28
Payer: MEDICARE

## 2023-11-28 DIAGNOSIS — R26.89 BALANCE DISORDER: Primary | ICD-10-CM

## 2023-11-28 DIAGNOSIS — R26.89 OTHER ABNORMALITIES OF GAIT AND MOBILITY: ICD-10-CM

## 2023-11-28 PROCEDURE — 97530 THERAPEUTIC ACTIVITIES: CPT

## 2023-11-28 NOTE — PROGRESS NOTES
Daily Note     Today's date: 2023  Patient name: Raymond Lawson  : 1940  MRN: [id number]  Referring provider: Rick Juárez DO  Dx:   Encounter Diagnosis     ICD-10-CM    1. Balance disorder  R26.89       2. Other abnormalities of gait and mobility  R26.89                   Subjective: Patient reports to PT session reporting her dizziness is less than last week. When changing position (in bed, or getting OOB) she gets a few seconds of dizziness. Objective: See treatment diary below:     NMR:  - STS from standard chair to fatigue: 15 reps, 13 reps   -VOR x 1 (seated, at end of session) no dizziness/motion sensitivity provoked. TA:   -R DHP negative for nystagmus, but upon lifting head up off pillow (for return to sit from [facility],) she experiences spinning for 10 seconds  - L DHP negative, with motion sensitivity upon lifting head and sitting up   Motion sensitivity further screening:  head nods in left cervical rotation, while seated: mild symptoms  with nose down to L knee (head down) seated x 5 seconds. Head nods in right cervical rotation:  no symptoms up or down. R Epley CRT  R Epley repeated: Mild dizziness (brief spin) provoked in each position change. Upon sitting up, no dizziness with head nods (head down or up) with cervical rotation to the left. None below today:   - Step ups 4" 10 reps each leg w/ TT  - Tandem ambulation: 5 laps x 10 ft  - Sidestepping w/ horizontal head turns: 6 laps x 10 ft  - FWD/BWD ambulation w/ vertical head turns: 10 laps x 10 ft  - Biodex: static, no UE  LOS small 34%, med 26%. Maze: med -24%, med 30%    HEP:  -VOR x 1 seated 60 sec H and V 2 sets 3 times daily (23 reminded pt to perform VOR x 1 at home, she has been performing while standing and symproms have been provoked. No sx during VOR x 1 while seated in clinic this date. )     Assessment: Patient tolerated treatment well today.  The source of her dizziness may be multi-factorial.  She has been treated the last few visits for subjective BPPV. R head thrust has been positive, indicating peripheral vestibular hypofunction/dysfunction. Consider positionals with goggles. Consider JPET testing. Patient will continue to benefit from skilled OPPT services to reduce overall risk for falls and maximize function. Plan: Continue per plan of care. Continue positional reassessment. Patient with possible subjective BPPV. Consider JPET testing, and positionals with goggles.     HEP.   -Vor x 1  -added chair poses 11/14/23    Outcome Measures Initial Eval  9/27/23 9/28  See above       5xSTS 20.56 sec        TUG  - Regular  - Cognitive   23.38 sec  50.54 sec        10 meter defer        FGA 11/30        DGI defer        mCTSIB  - FTEO (firm)  - FTEC (firm)  - FTEO (foam)  - FTEC (foam)   30 sec  30 sec  30 sec  4.75 sec        6MWT defer        MSQ   39%                          Precautions:   Past Medical History:   Diagnosis Date    Arthritis     osteo    Autoimmune hepatitis ([address])     Back pain with radiation     to bilat legs    De Santiago esophagus     De Santiaog's esophagus     Chronic GERD     Chronic uveitis     bilateral    CPAP (continuous positive airway pressure) dependence     Diabetes mellitus (HCC)     diet controlled    Disease of thyroid gland     hypo    Diverticulitis     Fibromyalgia syndrome     Fibromyalgia, primary     Hiatal hernia     Hx of colonoscopy 2016    EGD done simultaneously    Hyperlipidemia     Hypertension     Infectious viral hepatitis     PONV (postoperative nausea and vomiting)     Sinus congestion     Sleep apnea     CPAP rx, does not use mask

## 2023-11-30 ENCOUNTER — OFFICE VISIT (OUTPATIENT)
Facility: CLINIC | Age: 83
End: 2023-11-30
Payer: MEDICARE

## 2023-11-30 DIAGNOSIS — R26.89 OTHER ABNORMALITIES OF GAIT AND MOBILITY: ICD-10-CM

## 2023-11-30 DIAGNOSIS — R42 DIZZINESS: Primary | ICD-10-CM

## 2023-11-30 DIAGNOSIS — R26.89 BALANCE DISORDER: Primary | ICD-10-CM

## 2023-11-30 PROCEDURE — 97112 NEUROMUSCULAR REEDUCATION: CPT | Performed by: PHYSICAL THERAPIST

## 2023-11-30 NOTE — PROGRESS NOTES
Daily Note     Today's date: 2023  Patient name: Shayy Szymanski  : 1940  MRN: [id number]  Referring provider: Anna Vela DO  Dx:   Encounter Diagnosis     ICD-10-CM    1. Balance disorder  R26.89       2. Other abnormalities of gait and mobility  R26.89                     Subjective: Patient reports last week she was dizzy quite a bit. She did experience increased dizziness after last PT session (at night). She does report dizziness any time she gets up from a supine position. Objective: See treatment diary below:     NMR:  L Agustin-Hallpike: + for symptoms lasting about 5 sec, no nystagmus   R Agustin-Hallpike: negative  Spinning dizziness + nausea reported with return to sit after both sides    Alcala-Daroff exercises 2x each side  Mild dizziness lying down, significant dizziness with return to sit    Discussed sources of dizziness, education on BPPV, and referral for VNG     None below today:   - Step ups 4" 10 reps each leg w/ TT  - Tandem ambulation: 5 laps x 10 ft  - Sidestepping w/ horizontal head turns: 6 laps x 10 ft  - FWD/BWD ambulation w/ vertical head turns: 10 laps x 10 ft  - Biodex: static, no UE  LOS small 34%, med 26%. Maze: med -24%, med 30%    HEP:  -VOR x 1 seated 60 sec H and V 2 sets 3 times daily (23 reminded pt to perform VOR x 1 at home, she has been performing while standing and symproms have been provoked. No sx during VOR x 1 while seated in clinic this date. )     Assessment: Patient tolerated treatment well today. The source of her dizziness is likely multi-factorial and she has been treated the last few visits for subjective R posterior canal BPPV. R head thrust has been positive, indicating peripheral vestibular hypofunction/dysfunction. Today she is positive for L subjective posterior canal BPPV, but the most symptomatic is when she returns to sit. [facility] today and she gets most dizzy with return to sit. Reduced symptoms by 2nd rep. Unclear whether her symptoms are truly BPPV or not, so at this point recommend referral for VNG. Patient will continue to benefit from skilled OPPT services to reduce overall risk for falls and maximize function. Plan: Continue per plan of care. Continue positional reassessment. Patient with possible subjective BPPV. Consider JPET testing, and positionals with goggles.     HEP.   -Vor x 1  -added chair poses 11/14/23    Outcome Measures Initial Eval  9/27/23 9/28  See above       5xSTS 20.56 sec        TUG  - Regular  - Cognitive   23.38 sec  50.54 sec        10 meter defer        FGA 11/30        DGI defer        mCTSIB  - FTEO (firm)  - FTEC (firm)  - FTEO (foam)  - FTEC (foam)   30 sec  30 sec  30 sec  4.75 sec        6MWT defer        MSQ   39%                          Precautions:   Past Medical History:   Diagnosis Date    Arthritis     osteo    Autoimmune hepatitis ([address])     Back pain with radiation     to bilat legs    De Santiago esophagus     De Santiago's esophagus     Chronic GERD     Chronic uveitis     bilateral    CPAP (continuous positive airway pressure) dependence     Diabetes mellitus (HCC)     diet controlled    Disease of thyroid gland     hypo    Diverticulitis     Fibromyalgia syndrome     Fibromyalgia, primary     Hiatal hernia     Hx of colonoscopy 2016    EGD done simultaneously    Hyperlipidemia     Hypertension     Infectious viral hepatitis     PONV (postoperative nausea and vomiting)     Sinus congestion     Sleep apnea     CPAP rx, does not use mask

## 2023-12-05 ENCOUNTER — OFFICE VISIT (OUTPATIENT)
Facility: CLINIC | Age: 83
End: 2023-12-05
Payer: MEDICARE

## 2023-12-05 DIAGNOSIS — R26.89 BALANCE DISORDER: Primary | ICD-10-CM

## 2023-12-05 DIAGNOSIS — R26.89 OTHER ABNORMALITIES OF GAIT AND MOBILITY: ICD-10-CM

## 2023-12-05 PROCEDURE — 97112 NEUROMUSCULAR REEDUCATION: CPT

## 2023-12-05 NOTE — PROGRESS NOTES
Daily Note     Today's date: 2023  Patient name: Osman Marmolejo  : 1940  MRN: [id number]  Referring provider: Rafy Brush DO  Dx:   Encounter Diagnosis     ICD-10-CM    1. Balance disorder  R26.89       2. Other abnormalities of gait and mobility  R26.89                     Subjective: Patient reports that she gets dizzy when she is sitting up from laying in her bed. Objective: See treatment diary below:     NMR:  L Concord-Hallpike: negative  R Concord-Hallpike: negative  R roll test: negative  L Roll test: negative     BP position changes:   - Laying down: 150/71 mmHg  - Sitting up: 90/61 mmHg  - Standing up: 130/70 mmHg    - Lateral Stepping over 6 in hurdles: 4 laps    None below today:   - Step ups 4" 10 reps each leg w/ TT  - Tandem ambulation: 5 laps x 10 ft  - Sidestepping w/ horizontal head turns: 6 laps x 10 ft  - FWD/BWD ambulation w/ vertical head turns: 10 laps x 10 ft  - Biodex: static, no UE  LOS small 34%, med 26%. Maze: med -24%, med 30%    HEP:  -VOR x 1 seated 60 sec H and V 2 sets 3 times daily     Assessment: Patient tolerated treatment well today. Patient negative for all positional testing. Demonstrated positional orthostatic hypotension when going from a laying to sitting position as her systolic BP dropped from 054 to 90. Pt also reported increased symptoms of lightheadedness with changing positions which is consistent with orthostatic hypotension. Will contact PCP on these findings. Patient will follow-up with a VNG within the next few days. Patient will continue to benefit from skilled OPPT services to reduce overall risk for falls and maximize function. Plan: Continue per plan of care.       HEP.   -Vor x 1  -added chair poses 23    Outcome Measures Initial Eval  23  See above       5xSTS 20.56 sec        TUG  - Regular  - Cognitive   23.38 sec  50.54 sec        10 meter defer        FGA         DGI defer        mCTSIB  - FTEO (firm)  - FTEC (firm)  - FTEO (foam)  - FTEC (foam)   30 sec  30 sec  30 sec  4.75 sec        6MWT defer        MSQ   39%                          Precautions:   Past Medical History:   Diagnosis Date    Arthritis     osteo    Autoimmune hepatitis ([address])     Back pain with radiation     to bilat legs    De Santiago esophagus     De Santiago's esophagus     Chronic GERD     Chronic uveitis     bilateral    CPAP (continuous positive airway pressure) dependence     Diabetes mellitus (HCC)     diet controlled    Disease of thyroid gland     hypo    Diverticulitis     Fibromyalgia syndrome     Fibromyalgia, primary     Hiatal hernia     Hx of colonoscopy 2016    EGD done simultaneously    Hyperlipidemia     Hypertension     Infectious viral hepatitis     PONV (postoperative nausea and vomiting)     Sinus congestion     Sleep apnea     CPAP rx, does not use mask

## 2023-12-06 ENCOUNTER — TELEPHONE (OUTPATIENT)
Dept: FAMILY MEDICINE CLINIC | Facility: CLINIC | Age: 83
End: 2023-12-06

## 2023-12-06 NOTE — TELEPHONE ENCOUNTER
----- Message from Lyssa Ocampo, PT sent at 12/5/2023  3:33 PM EST -----  Dr. Napier,    My name is Lyssa Ocampo. I am a physical therapist at [facility]. I am currently treating one of your patients, Rosa Kapoor. She has been complaining of lightheadedness when sitting up from a laying position. Today, I tested for orthostatic hypotension and she had a drop from 150/71 mmHg to 90/61 mmHg. I wanted to relay this information to you as this continues to be her main complaint. I know that you recently put in an order for a VNG, however I wanted to let you know of these additional findings based on her BP. We believe that her dizziness is multifactorial. If you have any questions, please do not hesitate to reach out.     Thank you,   Lyssa Ocampo, PT, DPT

## 2023-12-07 ENCOUNTER — EVALUATION (OUTPATIENT)
Facility: CLINIC | Age: 83
End: 2023-12-07
Payer: MEDICARE

## 2023-12-07 ENCOUNTER — OFFICE VISIT (OUTPATIENT)
Dept: AUDIOLOGY | Facility: CLINIC | Age: 83
End: 2023-12-07
Payer: MEDICARE

## 2023-12-07 DIAGNOSIS — R26.89 OTHER ABNORMALITIES OF GAIT AND MOBILITY: ICD-10-CM

## 2023-12-07 DIAGNOSIS — R26.89 BALANCE DISORDER: Primary | ICD-10-CM

## 2023-12-07 DIAGNOSIS — R42 DIZZINESS: Primary | ICD-10-CM

## 2023-12-07 DIAGNOSIS — H91.90 HEARING LOSS, UNSPECIFIED HEARING LOSS TYPE, UNSPECIFIED LATERALITY: ICD-10-CM

## 2023-12-07 PROCEDURE — 92540 BASIC VESTIBULAR EVALUATION: CPT | Performed by: AUDIOLOGIST

## 2023-12-07 PROCEDURE — 92537 CALORIC VSTBLR TEST W/REC: CPT | Performed by: AUDIOLOGIST

## 2023-12-07 PROCEDURE — 97530 THERAPEUTIC ACTIVITIES: CPT | Performed by: PHYSICAL THERAPIST

## 2023-12-07 PROCEDURE — 92567 TYMPANOMETRY: CPT | Performed by: AUDIOLOGIST

## 2023-12-07 NOTE — PROGRESS NOTES
PT Re-Evaluation          POC expires Auth Status Total   Visits  Start date  Expiration date PT/OT + Visit Limit? Co-Insurance   23 N/A N/A N/A N/A PT, MAJO No and $0 Co-pay                                                  Today's date: 2023  Patient name: Amanuel Guerra  : 1940  MRN: [id number]  Referring provider: Brynn Lawrence DO  Dx:   Encounter Diagnosis     ICD-10-CM    1. Balance disorder  R26.89       2. Other abnormalities of gait and mobility  R26.89               Assessment    UPDATED 23: Paul Rizzo had VNG this afternoon and had no response to calorics which could indicate bilateral hypofunction. Otherwise no abnormal findings from VNG. She demonstrates improvements in 5x STS and is now deemed low fall risk. She remains high fall risk per TUG and TUG cog, however dual task cost is decreasing. She also remains high fall risk per FGA and gait speed. Gait speed indicates limited community ambulator. She continues to veer with head turns during gait. She tested + for orthostatic hypotension last visit and we reached out to PCP regarding it. Her chief complaint at this time is imbalance more than the dizziness. Discussed plan moving forward to focus on dynamic balance and gait for the next month, and then re-assess for improvements. Pt in agreement with this plan. UPDATED 23: Paul Rizzo has attended PT for the past month for the diagnoses above. She reports feeling more agile since starting PT. She continues to report dizziness and has tested (+) for orthostatic hypotension, and displays significant motion sensitivity. Objective measures:  5XSTS improved from 20 to 14.5 seconds improved by more than the MDC 2.3 sec. (14.8 sec for age/gender norm.)  TUG improved from 23 to 12 seconds, more than 4.4 MDC and better than 13.5 safety cutoff. TUG cog improved from 50 to 18 seconds. FGA improved from  to .  She remains high fall risk. She continues to demonstrate significant veering during gait with horizontal head turns. She must slow prior to stepping over a 6" obstacles. Bales score 46/56 indicates she remains high fall risk. Goal completion:  Patient has met several  STG goals  and 1 LTG with progress towards additional goals, at this time. Recommendations:    Advise continued PT per POC, to continue vestibular therapy with habituation, and dynamic gait/balance training to improve mobility to safe level in the community. Paul Rizzo is in aggreement with this plan. Plan for 6MWT next visit. Initial PT Assessment details: Patient is a 79 y.o. Female who presents to skilled outpatient PT with imbalance, dizziness and difficulty with stair negotiation affecting her functional indpendence and ability to participate in her community . Patient's past medical history significant for falls, controlled HTN, and substantial surgical history. She demonstrates deficits in muscular strength, ambulatory safety, dual tasking, dynamic and static balance as per scores on 5x STS, TUG and TUG cog, FGA, and mCTSIB respectively. As per APTA and Rehab Measure cuttoff scores, Paul Rizzo categorizes at a HIGH risk for falls with respect to the aforementioned outcome measures. Importantly, patient with occurrence of subjective dizziness symptoms following position changes (supine to stand,) ambulation with head turns, backwards walking and activities where vision was occluded. This could indicate a vestibular system dysfunction, plan to perform oculomotor and cervical screen to assess possible vestibular system involvement, as well as screen for orthostatic hypotension. Patient demonstrates high reliance on visual system for dynamic and static balance activities further perpetuating her risk for falls.  Unable to assess gait speed and cardiovascular endurance today due to time constraints, plan to assess in future session to appropriate educate on use of assisted device while ambulating in her community. She will benefit from skilled PT to target balance deficits to improve patient overall function, decrease risk for fall to promote independence and participation in her community. Impairments: Abnormal coordination, Abnormal gait, Abnormal muscle tone, Abnormal or restricted ROM, Activity intolerance, Impaired balance, Impaired physical strength, Lacks appropriate HEP, Poor posture, Poor body mechanics, Pain with function, Safety issue, Weight-bearing intolerance, Abnormal movement, Difficulty understanding, Abnormal muscle firing  Understanding of Dx/Px/POC: Good  Prognosis: Good    Patient verbalized understanding of POC. Please contact me if you have any questions or recommendations. Thank you for the referral and the opportunity to share in Juan Douse care.         Plan  Plan details: balance training, strengthening, vestibular training  Patient would benefit from: Skilled PT  Planned modality interventions: Biofeedback, Cryotherapy, TENS, Thermotherapy  Planned therapy interventions: Abdominal trunk stabilization, ADL training, Balance, Balance/WB training, Breathing training, Body mechanics training, Coordination, Functional ROM exercises, Gait training, HEP, Joint Mobilization, Manual Therapy, Felix taping, Motor coordination training, Neuromuscular re-education, Patient education, Postural training, Strengthening, Stretching, Therapeutic activities, Therapeutic exercises, Therapeutic training, Transfer training, Activity modification, Work reintegration  Frequency: 2x/wk  Duration in weeks: 12  Plan of Care beginning date: 12/20/2023  Plan of Care expiration date: 12 weeks - 3/13/2024  Treatment plan discussed with: Patient and Family       Goals  Short Term Goals (4 weeks):    - Patient will improve time on TUG by 2.9 seconds to facilitate improved safety in all ambulation MET   - Patient will be independent in basic HEP 2-3 weeks -MET  - Patient will improve 5xSTS score by 2.3 seconds to promote improved LE functional strength needed for ADLs -MET   - Patient will reduce dual task cost to < 25% to improve balance and ability to dual task. -NOT MET    Long Term Goals (12 weeks):  - Patient will be independent in a comprehensive home exercise program-NOT MET  - Patient will improve gait speed to 1.0 m/s or greater to improve safety with community ambulation- NOT MET  - Patient will improve scoring on FGA by 4 points to progress safety with dynamic tasks- MET   - Patient will be able to demonstrate HT in gait without veering- NOT MET  - Patient will report going on walks at least 3 days per week to promote independence and improved cardiovascular endurance- NOT MET  - Patient will report 50% reduction in near falls when ambulating on uneven terrain- NOT MET  - Patient will reduce dual task cost to < 10% to improve balance and ability to dual task. - NOT MET  - Patient will increase FGA score to 25/30 to reduce fall risk.        Cut off score    All date taken from APTA Neuro Section or Rehab Measures      Bales/58  MDC: 6 pts  Age Norms:  57-79: M - 54   F - 55  70-79: M - 47   F - 53  80-89: M - 48   F - 50 5xSTS: Mallory et al 2010  MDC: 2.3 sec  Age Norms:  60-69: 11.1 sec  70-79: 12.6 sec  80-89: 14.8 sec   TUG  MDC: 4.14 sec  Cut off score:  >13.5 sec community dwelling adults  >32.2 frail elderly  <20 I for basic transfers  >30 dependent on transfers 10 Meter Walk Test: Jessica chapin 2011  MDC: 0.18 m/s  20-29: M - 1.35 m   F - 1.34 m  30-39: M - 1.43 m   F - 1.34 m  40-49: M - 1.43 m   F - 1.39 m  50-59: M - 1.43 m   F - 1.31 m  60-69: M - 1.34 m   F - 1.24 m  70-79: M - 1.26 m   F - 1.13 m  80-89: M - 0.97 m   F - 0.94 m    Household Ambulator < 0.4 m/s  Limited Community Ambulator 0.4 - 0.8 m/s  [employer] Ambulator 0.8 - 1.2 m/s  Safely cross the street > 1.2 m/s   FGA  MCID: 4 pts  Geriatrics/community < 22/30 fall risk  Geriatrics/community < 20/30 unexplained falls    DGI  MDC: vestibular - 4 pts  MDC: geriatric/community - 3 pts  Falls risk <19/24 mCTSIB  Norm: 20-60 yrs  Eyes open firm: norm sway 0.21-0.48  Eyes closed firm: norm sway 0.48-0.99  Eyes open foam: norm sway 0.38-0.71  Eyes closed foam: norm sway 0.70-2.22   6 Minute Walk Test  MDC: 190.98 ft  MCID: 164 ft    Age Norms  57-79: M - 1876 ft (571.80 m)  F - 1765 ft (537.98 m)  70-79: M - 1729 ft (527.00 m)  F - 1545 ft (470.92 m)  80-89: M - 1368 ft (416.97 m)  F - 1286 ft (391.97 m) ABC: [address], 2003  <67% increased risk for falls         Subjective    Update 12/7/23: Pt reports she feels like her walking has actually gotten better. She had VNG today and she had no response to calorics which could indicate bilateral hypofunction (no central signs and negative for BPPV). She would like to be able to walk more, but sometimes her back hurts. Wants to be able to walk without "solange-tottering" outside in open environments. Updated 11/2/23: Patient reports feeling more agile since starting PT one month ago. Initial assessment:History of Present Illness  - Mechanism of injury: Patient is an 79year old female presenting to PT with complaints of difficulty climbing stairs, imbalance and history of falls. Past medical history is significant for controlled HTN, L hip pain, and substantial surgical history. She reports she's only had one recent fall (August 2023) which occurred because she missed a step, lost her balance anteriorly and landed on her R hand and face. She is currently attending hand therapy for subsequent fracture in R hand. Larry Rojas reports feelings of dizziness described as "lightheaded and floaty" that accompanies imbalance especially when waking up in the middle of the night and walking in the dark. She reports dizziness does not last longer than a couple of seconds and denies any falls proceeded by described dizzines.  Patient does not currently ambulate with an AD but her daughter has purchased her a SPC due to her unsteadiness. - Primary AD: daughter bought her a cane recently, no ad at home or in community.   - Assist level at home: independent  - Decreased fine motor tasks: No      Pain  - Current pain ratin/10  - At best pain ratin/10  - At worst pain ratin-7/10  - Location: L buttock, hip  - Aggravating factors: standing, morning    Social Support  - Steps to enter house: none, 1 garage steps  - Stairs in house: no steps, ranch style house  - Lives in: ranch style house  - Lives with:  and daughter visits occasionally    - Employment status: retired,  for [employer]  - Hand dominance: R handed    Treatments  - Previous treatment: Hand therapy, ortho PT for shoulder   - Current treatment: Hand therapy  - Diagnostic Testing: x-ray from fall of hand.        Objective     LE MMT  - R Hip Flexion: 3+/5  L Hip Flexion: 4-/5  - R Hip Extension: 4/5  L Hip Extension: 4/5  - R Hip Abduction: 4/5  L Hip Abduction: 4/5  - R Hip Adduction: 4-/5  L Hip Adduction: 4+/5  - R Knee Extension: 4/5 (pain in R quad)  L Knee Extension: 5/5  - R Knee Flexion: 5/5  L Knee Flexion: 5/5  - R Ankle DF: 4-/5   L Ankle DF: 4/5  - R Ankle PF: 5/5   L Ankle PF: 5/5    Sensation  - Light touch: normla, B/L numbness at lateral aspects of shins    Coordination  - Heel to Shin: Normal, weakness evident  - Alternate Toe Taps: normal  - Finger to Nose: normal    Myelopathy Screen (>3/5 +)  - Lowry's Reflex: -  - Inverted Supinator Sign: -  - Age > 45: Yes  - Gait Deviation: No      Gait  - Abnormalities: decreased gait speed, decreased trunk rotation, decreased B/L foot clearance         Outcome Measures Initial Eval  23 PN  23 Re-eval  23      5xSTS 20.56 sec 14.5 s 12.70 sec      TUG  - Regular  - Cognitive   23.38 sec  50.54 sec   -12.31  -18    46% dual task cost   13.94 sec  19.10 sec    37% dual task cost      10 meter defer 12.06 sec 14.86 sec  0.67 m/s      FGA 11/30 20/30 19/30      mCTSIB  - FTEO (firm)  - FTEC (firm)  - FTEO (foam)  - FTEC (foam)   30 sec  30 sec  30 sec  4.75 sec     NT        6MWT defer NT        Amairani   46/56                     Interventions 12/7:  - Walking with self ball toss 50 ft x 6 reps  - Walking with head turns 50 ft x 4 each way   - Resisted sidestepping 4x each direction      Precautions:   Past Medical History:   Diagnosis Date    Arthritis     osteo    Autoimmune hepatitis ([address])     Back pain with radiation     to bilat legs    De Santiago esophagus     De Santiago's esophagus     Chronic GERD     Chronic uveitis     bilateral    CPAP (continuous positive airway pressure) dependence     Diabetes mellitus (HCC)     diet controlled    Disease of thyroid gland     hypo    Diverticulitis     Fibromyalgia syndrome     Fibromyalgia, primary     Fractures     Hiatal hernia     Hx of colonoscopy 2016    EGD done simultaneously    Hyperlipidemia     Hypertension     Infectious viral hepatitis     Osteoarthritis     PONV (postoperative nausea and vomiting)     Sinus congestion     Sleep apnea     CPAP rx, does not use mask

## 2023-12-07 NOTE — PROGRESS NOTES
VESTIBULAR EVALUATION - [facility] AUDIOLOGY    Patient Name: Ronald Louis   MRN:  [id number]   :  1940   Age: 79 y.o. Gender: female   DOS: 2023     HISTORY:     Ronald Louis, a 79 y.o. female, was seen on 2023 at the referral of Dr. Stephan Quan for Videonystagmography (VNG)  testing due to complaints of recurrent dizziness. Ms. Vignesh Lopez was unaccompanied to today's visit. Ms. Vignesh Lopez is a new patient to our practice. Today, Ms. Vignesh Lopez reported that onset of symptoms began several years ago and have been intermittent over time. More recently, over the last several months Ms. Vignesh Lopez endorsed a "woozy" sensation of malaise during positional head movements, notably elevating her head off the pillow and getting up from bed. Other exacerbating positional movements were noted to include bending over and orthostatic positioning from seated. Associated symptoms include occasional nausea. Typical duration of symptoms was noted to last seconds to minutes each before subsiding. Episode frequency occurs on a daily basis. Ms. Vignesh Lopez denied shortness of breath, changes in hearing sensitivity, blurry vision/changes in vision, light sensitivity (photophobia), dizziness to sounds/phonophobia, and heightened anxiety when she feels dizzy. She endorsed a history of numbness (of which she attributed to arthritis) and remote history of migraine. Ms. Vignesh Lopez has fallen previously, most recently over the summer. While ambulating in the dark Ms. Vignesh Lopez missed as step in her family member's living room. There were no known injuries or complications from this. She did not seek medical attention at that time.       Other documented medical problems include:  has a past medical history of Arthritis, Autoimmune hepatitis ([address]), Back pain with radiation, De Santiago esophagus, De Santiago's esophagus, Chronic GERD, Chronic uveitis, CPAP (continuous positive airway pressure) dependence, Diabetes mellitus ([address]), Disease of thyroid gland, Diverticulitis, Fibromyalgia syndrome, Fibromyalgia, primary, Fractures, Hiatal hernia, colonoscopy (2016), Hyperlipidemia, Hypertension, Infectious viral hepatitis, Osteoarthritis, PONV (postoperative nausea and vomiting), Sinus congestion, and Sleep apnea. Medication review completed; Ms. Korina Bang denied administration of restricted medications. Usage of alcohol/nicotine/large changes in daily caffeine intake in advance of today's testing was denied. General diet/nutrition intake was characterized as good. Ms. Korina Bang stated that they have poor sleep hygiene, and described that she frequently wakes up restless. Overall stress levels were described as normal.     Ms. Korina Bang has not had her hearing tested previously. Ms. Korina Bang denied otalgia, otorrhea, aural fullness, and tinnitus. Tympanometry performed to confirm normal middle ear function. Resultant tracings were type A, AU. VNG RESULTS:    SACCADES WNL for saccadic velocity, accuracy, and latency in both directions   SMOOTH PURSUIT WNL for pursuit gain and symmetry   OPTOKINETICS WNL for OKN velocity and gain symmetry   GAZE WNL; no nystagmus observed in horizontal or vertical planes   SPONTANEOUS WNL; no nystagmus observed   HEADSHAKE Negative for nystagmus post-headshake. No subjective dizziness reported   NAGI-HALLPIKE  Technique: Semi-supported Negative for nystagmus, bilaterally   POSITIONALS WNL; no nystagmus seen in head left/center/right conditions. No subjective dizziness reported. CALORICS Abnormal bilateral bithermal caloric study. Caloric Weakness: Absent responses, bilaterally to both warm and cool air. *Caloric irrigation completed without incident and good parting otoscopy     Session notes: Absent responses obtained for both warm and cool air. Biologic check confirmed device output. Caloric machine is not generating any temperature or flow warnings.  Ms. Korina Bang reported no subjective sensations of rotation or movement that would be expected during caloric irrigation. Otoscopy revealed non-occluding cerumen, bilaterally. TM was in view during each irrigation. Other technical and recording errors were ruled out. VNG IMPRESSIONS:   Abnormal VNG study. Oculomotor exam was normal. There is no evidence of BPPV-type nystagmus. Absent findings during calorics in conjunction with normal headshake testing suggest possible bilateral hypofunction. Other possibilities include medication effects and ME dysfunction (normal tympanograms obtained today). Ms. Rolando Harris denied administration of vestibular suppressants or any medications outside of those documented with her EMR. Dizziness is likely polysensory. Other documented concern includes orthostatics (see vestibular therapy testing). Further vestibular testing for additional characterization of symptoms (vHIT, rotary chair) can be considered to confirm caloric findings and build upon today's results. RECOMMENDATIONS:  1.) Follow-up with referring provider to review today's results. If vHIT or rotational chair is recommended for further vestibular assessment is recommended, these can be completed at [facility] or [address] [place] [address].  2.) Fall precautions after vestibular system stimulation were discussed at length with the patient. Though most test effects are expected to subside within minutes, it was recommended that they allot extra time for ambulation, avoid rigorous activities, use handrails when available, and be cautious of poorly lit areas for the rest of the day. 3.) Continue to monitor dizziness symptoms. If symptoms worsen or fail to improve prior to follow-up with their referring provider, urgent medical attention should be considered.   4.) Ms. Rolando Harris was reminded to be mindful of general wellness when considering dizziness etiologies, including: eating a healthy/balanced diet, proper hydration, stress reduction, proper sleep hygiene, maintenance of an exercise regimen, etc. They were encouraged to follow-up with their primary care physician for further guidance on these topics. Today's results were counseled at length with Ms. Nixon Carpio and all of her questions were addressed. It was a pleasure working with Ms. Nixon Carpio today. Thank you for referring this patient. Adolfo Smims.   Audiology Coordinator, Clinical Audiologist    [address] [id number]

## 2023-12-07 NOTE — Clinical Note
The only abnormal finding that was demonstrated on the VNG exam today was absent calorics. Oculomotor exam was normal. There is no evidence of BPPV-type nystagmus on positioning testing. Absent findings during calorics in conjunction with normal headshake testing suggest possible bilateral hypofunction. Other possibilities include medication effects and middle ear dysfunction (normal tympanograms obtained today). Ms. Silver White denied administration of any medications outside of those documented with her EMR, including vestibular suppressants. Bilateral hypofunction is a rare, typically debilitating condition that presents as oscillopsia and severe functional imbalance, which is inconsistent with the patient's description of dizziness. Further vestibular testing for additional characterization of symptoms (vHIT, rotary chair) can be considered to confirm caloric findings suggesting bilateral weakness if symptoms persist after addressing orthostatics.

## 2023-12-11 ENCOUNTER — TELEPHONE (OUTPATIENT)
Dept: GASTROENTEROLOGY | Facility: CLINIC | Age: 83
End: 2023-12-11

## 2023-12-11 ENCOUNTER — OFFICE VISIT (OUTPATIENT)
Dept: GASTROENTEROLOGY | Facility: CLINIC | Age: 83
End: 2023-12-11
Payer: MEDICARE

## 2023-12-11 VITALS
DIASTOLIC BLOOD PRESSURE: 82 MMHG | HEIGHT: 58 IN | BODY MASS INDEX: 30.44 KG/M2 | WEIGHT: 145 LBS | SYSTOLIC BLOOD PRESSURE: 113 MMHG | HEART RATE: 79 BPM

## 2023-12-11 DIAGNOSIS — K75.4 AUTOIMMUNE HEPATITIS (HCC): ICD-10-CM

## 2023-12-11 DIAGNOSIS — K22.70 BARRETT'S ESOPHAGUS WITHOUT DYSPLASIA: ICD-10-CM

## 2023-12-11 DIAGNOSIS — K21.00 GASTROESOPHAGEAL REFLUX DISEASE WITH ESOPHAGITIS WITHOUT HEMORRHAGE: ICD-10-CM

## 2023-12-11 DIAGNOSIS — R19.4 CHANGE IN BOWEL HABIT: Primary | ICD-10-CM

## 2023-12-11 DIAGNOSIS — Z12.11 COLON CANCER SCREENING: ICD-10-CM

## 2023-12-11 DIAGNOSIS — K59.01 SLOW TRANSIT CONSTIPATION: ICD-10-CM

## 2023-12-11 PROCEDURE — 99213 OFFICE O/P EST LOW 20 MIN: CPT | Performed by: PHYSICIAN ASSISTANT

## 2023-12-11 NOTE — H&P (VIEW-ONLY)
[facility] - Outpatient Follow-up Note  Rosa Kapoor 83 y.o. female MRN: [id number]  Encounter: [id number]          ASSESSMENT AND PLAN:      1. Gastroesophageal reflux disease with esophagitis without hemorrhage  2. 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 Autoimmune hepatitis (HCC)  Patient with history of autoimmune hepatitis with normal LFTs    4. Slow transit constipation  5.  Change in bowel habits  Patient does have longstanding history of constipation and she does note that her stools have changed over the past few months which are more pencillike and more frequent, denies any dietary changes or changes in medications so would recommend colonoscopy since last 1 was performed in 2015, recommended to have colonoscopy and patient is scheduled but will think about it in the interim and Plenvu prep was ordered      5. Colon cancer screening  As above last colonoscopy performed in 2015 which did show some small polyps and diverticulosis    ______________________________________________________________________    SUBJECTIVE:        This is an 83 y.o. female with history of GERD, De Santiago's esophagus, lymphocytic colitis, autoimmune hepatitis, constipation, fibromyalgia, HLD, HTN, cholecystectomy presenting for follow-up. She is taking MiraLAX every other day for history of constipation. She is up-to-date with her colon cancer screening, last colonoscopy was in October 2015 and she was given a 10-year recall.  She has a history of autoimmune hepatitis, currently not on any medications her last LFTs in Oct 2023 were normal.  Patient had upper endoscopy performed in July showing history of De Santiago's esophagus mild antritis mild duodenitis.  Biopsies benign, repeat EGD recommended in 3 to 5 years.  She currently states that she still was having some upper abdominal pain with radiation to the right  which has improved.  She is status postcholecystectomy.  She also notes that stools are smaller.  She reports that they are smaller in caliber and more pencillike and she has noticed this probably for the past few months and she thought that this would go away.  Denies any rectal bleeding and she did have 2 polyps which were hyperplastic back in 2015. She has been watching her diet and follows weight watchers which she does eat plenty of fiber in her diet.  Her reflux symptoms generally controlled with Pepcid once daily which she has been taking consistently.                              REVIEW OF SYSTEMS IS OTHERWISE NEGATIVE.      Historical Information   Past Medical History:   Diagnosis Date    Arthritis     osteo    Autoimmune hepatitis (HCC)     Back pain with radiation     to bilat legs    De Santiago esophagus     De Santiago's esophagus     Chronic GERD     Chronic uveitis     bilateral    CPAP (continuous positive airway pressure) dependence     Diabetes mellitus (HCC)     diet controlled    Disease of thyroid gland     hypo    Diverticulitis     Fibromyalgia syndrome     Fibromyalgia, primary     Fractures     Hiatal hernia     Hx of colonoscopy 2016    EGD done simultaneously    Hyperlipidemia     Hypertension     Infectious viral hepatitis     Osteoarthritis     PONV (postoperative nausea and vomiting)     Sinus congestion     Sleep apnea     CPAP rx, does not use mask     Past Surgical History:   Procedure Laterality Date    BILATERAL SALPINGOOPHORECTOMY  1996    BREAST BIOPSY Right     CARPAL TUNNEL RELEASE Right 2004    CHOLECYSTECTOMY      COLONOSCOPY      FRACTURE SURGERY Right     ORIF femur, emily in place    HAND SURGERY      HIP SURGERY      HYSTERECTOMY  1974    GOPAL    JOINT REPLACEMENT Right 2004    knee    JOINT REPLACEMENT Left 2007    knee    JOINT REPLACEMENT Right 2011    hip    JOINT REPLACEMENT Left 2017    knee    KNEE SURGERY  2004,  2007    NH XCAPSL CTRC RMVL INSJ IO LENS PROSTH W/O ECP  Right 08/22/2016    Procedure: EXTRACTION EXTRACAPSULAR CATARACT PHACO INTRAOCULAR LENS (IOL);  Surgeon: Lionel Rudd MD;  Location: [facility] MAIN OR;  Service: Ophthalmology    MD XCAPSL CTRC RMVL INSJ IO LENS PROSTH W/O ECP Left 11/26/2018    Procedure: EXTRACTION EXTRACAPSULAR CATARACT PHACO INTRAOCULAR LENS (IOL);  Surgeon: Lionel Rudd MD;  Location: [facility] MAIN OR;  Service: Ophthalmology    UPPER GASTROINTESTINAL ENDOSCOPY       Social History   Social History     Substance and Sexual Activity   Alcohol Use No     Social History     Substance and Sexual Activity   Drug Use Never     Social History     Tobacco Use   Smoking Status Never    Passive exposure: Past   Smokeless Tobacco Never     Family History   Problem Relation Age of Onset    Heart disease Mother     Diverticulitis Mother     Stroke Father     COPD Sister     Liver disease Sister     Anesthesia problems Sister     Diabetes Brother     Cancer Brother     Cancer Maternal Grandmother         breast    Pancreatic cancer Cousin     Cancer Maternal Grandfather     Cancer Paternal Uncle        Meds/Allergies       Current Outpatient Medications:     acetaminophen (TYLENOL) 325 mg tablet    amLODIPine (NORVASC) 5 mg tablet    atorvastatin (LIPITOR) 10 mg tablet    carvedilol (COREG) 12.5 mg tablet    carvedilol (COREG) 3.125 mg tablet    famotidine (PEPCID) 20 mg tablet    gabapentin (Neurontin) 100 mg capsule    levothyroxine 75 mcg tablet    lisinopril (ZESTRIL) 40 mg tablet    polyethylene glycol (MIRALAX) 17 g packet    Allergies   Allergen Reactions    Biaxin [Clarithromycin] GI Intolerance    Codeine Other (See Comments)     dizzy    Latex Hives     Blisters and hives local reaction    Levaquin [Levofloxacin In D5w] GI Intolerance    Morphine And Related GI Intolerance    Nsaids GI Intolerance    Oxycodone Other (See Comments)     dizzy    Percocet [Oxycodone-Acetaminophen] Other (See Comments)     dizzy    Ultram [Tramadol Hcl] Other (See  Comments)     dizzy           Objective     There were no vitals taken for this visit. There is no height or weight on file to calculate BMI.      PHYSICAL EXAM:      General Appearance:   Alert, cooperative, no distress   HEENT:   Normocephalic, atraumatic, anicteric.     Neck:  Supple, symmetrical, trachea midline   Lungs:   Clear to auscultation bilaterally; no rales, rhonchi or wheezing; respirations unlabored    Heart::   Regular rate and rhythm; no murmur, rub, or gallop.   Abdomen:   Soft, non-tender, non-distended; normal bowel sounds; no masses, no organomegaly    Genitalia:   Deferred    Rectal:   Deferred    Extremities:  No cyanosis, clubbing or edema    Pulses:  2+ and symmetric    Skin:  No jaundice, rashes, or lesions    Lymph nodes:  No palpable cervical lymphadenopathy        Lab Results:   No visits with results within 1 Day(s) from this visit.   Latest known visit with results is:   Appointment on 10/09/2023   Component Date Value    WBC 10/09/2023 5.10     RBC 10/09/2023 4.64     Hemoglobin 10/09/2023 13.3     Hematocrit 10/09/2023 39.5     MCV 10/09/2023 85     MCH 10/09/2023 28.7     MCHC 10/09/2023 33.7     RDW 10/09/2023 13.1     Platelets 10/09/2023 184     MPV 10/09/2023 11.5     Sodium 10/09/2023 138     Potassium 10/09/2023 4.3     Chloride 10/09/2023 103     CO2 10/09/2023 28     ANION GAP 10/09/2023 7     BUN 10/09/2023 23     Creatinine 10/09/2023 0.57 (L)     Glucose, Fasting 10/09/2023 92     Calcium 10/09/2023 9.5     AST 10/09/2023 24     ALT 10/09/2023 20     Alkaline Phosphatase 10/09/2023 56     Total Protein 10/09/2023 6.9     Albumin 10/09/2023 4.0     Total Bilirubin 10/09/2023 0.50     eGFR 10/09/2023 85     Cholesterol 10/09/2023 151     Triglycerides 10/09/2023 86     HDL, Direct 10/09/2023 46 (L)     LDL Calculated 10/09/2023 88     TSH 3RD GENERATON 10/09/2023 3.817     Free T4 10/09/2023 1.06          Radiology Results:   No results found.

## 2023-12-11 NOTE — TELEPHONE ENCOUNTER
Scheduled date of colonoscopy (as of today):12/18/23  Physician performing colonoscopy:Dr. Sarah Aguilar  Location of colonoscopy:[facility]  Bowel prep reviewed with patient:Plenvu  Instructions reviewed with patient by:ti  Clearances: n/a

## 2023-12-11 NOTE — PROGRESS NOTES
[facility] - Outpatient Follow-up Note  Last Garza 79 y.o. female MRN: [id number]  Encounter: [id number]          ASSESSMENT AND PLAN:      1. Gastroesophageal reflux disease with esophagitis without hemorrhage  2. 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 Autoimmune hepatitis ([address])  Patient with history of autoimmune hepatitis with normal LFTs    4. Slow transit constipation  5. Change in bowel habits  Patient does have longstanding history of constipation and she does note that her stools have changed over the past few months which are more pencillike and more frequent, denies any dietary changes or changes in medications so would recommend colonoscopy since last 1 was performed in 2015, recommended to have colonoscopy and patient is scheduled but will think about it in the interim and Plenvu prep was ordered      5. Colon cancer screening  As above last colonoscopy performed in 2015 which did show some small polyps and diverticulosis    ______________________________________________________________________    SUBJECTIVE:        This is an 79 y.o. female with history of GERD, De Santiago's esophagus, lymphocytic colitis, autoimmune hepatitis, constipation, fibromyalgia, HLD, HTN, cholecystectomy presenting for follow-up. She is taking MiraLAX every other day for history of constipation. She is up-to-date with her colon cancer screening, last colonoscopy was in October 2015 and she was given a 10-year recall. She has a history of autoimmune hepatitis, currently not on any medications her last LFTs in Oct 2023 were normal.  Patient had upper endoscopy performed in July showing history of De Santiago's esophagus mild antritis mild duodenitis. Biopsies benign, repeat EGD recommended in 3 to 5 years.   She currently states that she still was having some upper abdominal pain with radiation to the right which has improved. She is status postcholecystectomy. She also notes that stools are smaller. She reports that they are smaller in caliber and more pencillike and she has noticed this probably for the past few months and she thought that this would go away. Denies any rectal bleeding and she did have 2 polyps which were hyperplastic back in 2015. She has been watching her diet and follows weight watchers which she does eat plenty of fiber in her diet. Her reflux symptoms generally controlled with Pepcid once daily which she has been taking consistently. REVIEW OF SYSTEMS IS OTHERWISE NEGATIVE.       Historical Information   Past Medical History:   Diagnosis Date    Arthritis     osteo    Autoimmune hepatitis ([address])     Back pain with radiation     to bilat legs    De Santiago esophagus     De Santiago's esophagus     Chronic GERD     Chronic uveitis     bilateral    CPAP (continuous positive airway pressure) dependence     Diabetes mellitus (HCC)     diet controlled    Disease of thyroid gland     hypo    Diverticulitis     Fibromyalgia syndrome     Fibromyalgia, primary     Fractures     Hiatal hernia     Hx of colonoscopy 2016    EGD done simultaneously    Hyperlipidemia     Hypertension     Infectious viral hepatitis     Osteoarthritis     PONV (postoperative nausea and vomiting)     Sinus congestion     Sleep apnea     CPAP rx, does not use mask     Past Surgical History:   Procedure Laterality Date    BILATERAL SALPINGOOPHORECTOMY  1996    BREAST BIOPSY Right     CARPAL TUNNEL RELEASE Right 2004    CHOLECYSTECTOMY      COLONOSCOPY      FRACTURE SURGERY Right     ORIF femur, emily in place    [address] REPLACEMENT Right 2004    knee    JOINT REPLACEMENT Left 2007    knee    JOINT REPLACEMENT Right 2011    hip    JOINT REPLACEMENT Left 2017    knee    KNEE SURGERY  2004,  2007    AR XCAPSL CTRC RMVL INSJ IO LENS PROSTH W/O ECP Right 08/22/2016    Procedure: EXTRACTION EXTRACAPSULAR CATARACT PHACO INTRAOCULAR LENS (IOL); Surgeon: Kapil Mims MD;  Location: [facility] MAIN OR;  Service: Ophthalmology    PA XCAPSL CTRC RMVL INSJ IO LENS PROSTH W/O ECP Left 11/26/2018    Procedure: EXTRACTION EXTRACAPSULAR CATARACT PHACO INTRAOCULAR LENS (IOL);   Surgeon: Kapil Mims MD;  Location: [facility] MAIN OR;  Service: Ophthalmology    UPPER GASTROINTESTINAL ENDOSCOPY       Social History   Social History     Substance and Sexual Activity   Alcohol Use No     Social History     Substance and Sexual Activity   Drug Use Never     Social History     Tobacco Use   Smoking Status Never    Passive exposure: Past   Smokeless Tobacco Never     Family History   Problem Relation Age of Onset    Heart disease Mother     Diverticulitis Mother     Stroke Father     COPD Sister     Liver disease Sister     Anesthesia problems Sister     Diabetes Brother     Cancer Brother     Cancer Maternal Grandmother         breast    Pancreatic cancer Cousin     Cancer Maternal Grandfather     Cancer Paternal Uncle        Meds/Allergies       Current Outpatient Medications:     acetaminophen (TYLENOL) 325 mg tablet    amLODIPine (NORVASC) 5 mg tablet    atorvastatin (LIPITOR) 10 mg tablet    carvedilol (COREG) 12.5 mg tablet    carvedilol (COREG) 3.125 mg tablet    famotidine (PEPCID) 20 mg tablet    gabapentin (Neurontin) 100 mg capsule    levothyroxine 75 mcg tablet    lisinopril (ZESTRIL) 40 mg tablet    polyethylene glycol (MIRALAX) 17 g packet    Allergies   Allergen Reactions    Biaxin [Clarithromycin] GI Intolerance    Codeine Other (See Comments)     dizzy    Latex Hives     Blisters and hives local reaction    Levaquin [Levofloxacin In D5w] GI Intolerance    Morphine And Related GI Intolerance    Nsaids GI Intolerance    Oxycodone Other (See Comments)     dizzy    Percocet [Oxycodone-Acetaminophen] Other (See Comments)     dizzy    Ultram [Tramadol Hcl] Other (See Comments)     dizzy           Objective     There were no vitals taken for this visit. There is no height or weight on file to calculate BMI. PHYSICAL EXAM:      General Appearance:   Alert, cooperative, no distress   HEENT:   Normocephalic, atraumatic, anicteric. Neck:  Supple, symmetrical, trachea midline   Lungs:   Clear to auscultation bilaterally; no rales, rhonchi or wheezing; respirations unlabored    Heart[de-identified]   Regular rate and rhythm; no murmur, rub, or gallop. Abdomen:   Soft, non-tender, non-distended; normal bowel sounds; no masses, no organomegaly    Genitalia:   Deferred    Rectal:   Deferred    Extremities:  No cyanosis, clubbing or edema    Pulses:  2+ and symmetric    Skin:  No jaundice, rashes, or lesions    Lymph nodes:  No palpable cervical lymphadenopathy        Lab Results:   No visits with results within 1 Day(s) from this visit. Latest known visit with results is:   Appointment on 10/09/2023   Component Date Value    WBC 10/09/2023 5.10     RBC 10/09/2023 4.64     Hemoglobin 10/09/2023 13.3     Hematocrit 10/09/2023 39.5     MCV 10/09/2023 85     MCH 10/09/2023 28.7     MCHC 10/09/2023 33.7     RDW 10/09/2023 13.1     Platelets 25/75/3299 184     MPV 10/09/2023 11.5     Sodium 10/09/2023 138     Potassium 10/09/2023 4.3     Chloride 10/09/2023 103     CO2 10/09/2023 28     ANION GAP 10/09/2023 7     BUN 10/09/2023 23     Creatinine 10/09/2023 0.57 (L)     Glucose, Fasting 10/09/2023 92     Calcium 10/09/2023 9.5     AST 10/09/2023 24     ALT 10/09/2023 20     Alkaline Phosphatase 10/09/2023 56     Total Protein 10/09/2023 6.9     Albumin 10/09/2023 4.0     Total Bilirubin 10/09/2023 0.50     eGFR 10/09/2023 85     Cholesterol 10/09/2023 151     Triglycerides 10/09/2023 86     HDL, Direct 10/09/2023 46 (L)     LDL Calculated 10/09/2023 88     TSH 3RD GENERATON 10/09/2023 3.817     Free T4 10/09/2023 1.06          Radiology Results:   No results found.

## 2023-12-12 ENCOUNTER — OFFICE VISIT (OUTPATIENT)
Facility: CLINIC | Age: 83
End: 2023-12-12
Payer: MEDICARE

## 2023-12-12 DIAGNOSIS — R26.89 OTHER ABNORMALITIES OF GAIT AND MOBILITY: ICD-10-CM

## 2023-12-12 DIAGNOSIS — R26.89 BALANCE DISORDER: Primary | ICD-10-CM

## 2023-12-12 PROCEDURE — 97112 NEUROMUSCULAR REEDUCATION: CPT

## 2023-12-12 NOTE — PROGRESS NOTES
Daily Note     Today's date: 2023  Patient name: Addison Sanderson  : 1940  MRN: [id number]  Referring provider: General Zaria DO  Dx:   Encounter Diagnosis     ICD-10-CM    1. Balance disorder  R26.89       2. Other abnormalities of gait and mobility  R26.89                     Subjective:       Objective: See treatment diary below:  See (+) VNG findings from 23. See (+) Orthostatic hypotension testing date 23)  Pt states she she is unable to step up a curb without at least light UE support. NMR:  -compensatory strategies   -head movement preceded by eye movement to visual target (compensatory saccades)  FA rigid    H: 90 sec, no dizziness provoked    V: 90 sec no dizziness provoked   -compensatory saccades/head turns FA foam 90 sec x 2 H: D 0/10   V:  D 0/10  -stepping from one foam beam to another for/rev step 2 cycles   -curbs w/o UE support:  4" x 8,  6" x 8, and then with SPC (up w/cane in R hand , down with cane in L hand)   -SPC gait level and stairs (SPC in R hand, except coming down stairs)       HEP:  -VOR x 1 seated 60 sec H and V 2 sets 3 times daily     Assessment: Patient tolerated treatment well today. Focus of treatment changed to compensatory strategies in light of potential bilateral hypofunction, to allow patient to move her head in function without symptom provocation. Patient with known orthostatic hypotension. Pt encouraged to use SPC on curbs and steps so she can be independent without other UE support. Patient will continue to benefit from skilled OPPT services to reduce overall risk for falls and maximize function. 23 VNG IMPRESSIONS per COURTNEY Man:   Abnormal VNG study. Oculomotor exam was normal. There is no evidence of BPPV-type nystagmus. Absent findings during calorics in conjunction with normal headshake testing suggest possible bilateral hypofunction.  Other possibilities include medication effects and ME dysfunction (normal tympanograms obtained today). Ms. Annie Ocampo denied administration of vestibular suppressants or any medications outside of those documented with her EMR. Dizziness is likely polysensory. Other documented concern includes orthostatics (see vestibular therapy testing). Further vestibular testing for additional characterization of symptoms (vHIT, rotary chair) can be considered to confirm caloric findings and build upon today's results. AUDIOLOGIST RECOMMENDATIONS:  1.) Follow-up with referring provider to review today's results. If vHIT or rotational chair is recommended for further vestibular assessment is recommended, these can be completed at [facility] or [address] [place] [address].  2.) Fall precautions after vestibular system stimulation were discussed at length with the patient. Though most test effects are expected to subside within minutes, it was recommended that they allot extra time for ambulation, avoid rigorous activities, use handrails when available, and be cautious of poorly lit areas for the rest of the day. 3.) Continue to monitor dizziness symptoms. If symptoms worsen or fail to improve prior to follow-up with their referring provider, urgent medical attention should be considered. 4.) Ms. Annie Ocampo was reminded to be mindful of general wellness when considering dizziness etiologies, including: eating a healthy/balanced diet, proper hydration, stress reduction, proper sleep hygiene, maintenance of an exercise regimen, etc. They were encouraged to follow-up with their primary care physician for further guidance on these topics. Plan: Continue per plan of care.       HEP.   -Vor x 1  -added chair poses 11/14/23    Outcome Measures Initial Eval  9/27/23 9/28  See above       5xSTS 20.56 sec        TUG  - Regular  - Cognitive   23.38 sec  50.54 sec        10 meter defer        FGA 11/30        DGI defer        mCTSIB  - FTEO (firm)  - FTEC (firm)  - FTEO (foam)  - FTEC (foam)   30 sec  30 sec  30 sec  4.75 sec        6MWT defer        MSQ 39%                          Precautions:   Past Medical History:   Diagnosis Date    Arthritis     osteo    Autoimmune hepatitis ([address])     Back pain with radiation     to bilat legs    De Santiago esophagus     De Santiago's esophagus     Chronic GERD     Chronic uveitis     bilateral    CPAP (continuous positive airway pressure) dependence     Diabetes mellitus (HCC)     diet controlled    Disease of thyroid gland     hypo    Diverticulitis     Fibromyalgia syndrome     Fibromyalgia, primary     Hiatal hernia     Hx of colonoscopy 2016    EGD done simultaneously    Hyperlipidemia     Hypertension     Infectious viral hepatitis     PONV (postoperative nausea and vomiting)     Sinus congestion     Sleep apnea     CPAP rx, does not use mask

## 2023-12-14 ENCOUNTER — OFFICE VISIT (OUTPATIENT)
Facility: CLINIC | Age: 83
End: 2023-12-14
Payer: MEDICARE

## 2023-12-14 DIAGNOSIS — R26.89 OTHER ABNORMALITIES OF GAIT AND MOBILITY: ICD-10-CM

## 2023-12-14 DIAGNOSIS — R26.89 BALANCE DISORDER: Primary | ICD-10-CM

## 2023-12-14 PROCEDURE — 97112 NEUROMUSCULAR REEDUCATION: CPT

## 2023-12-14 NOTE — PROGRESS NOTES
Daily Note     Today's date: 2023  Patient name: Reji Adams  : 1940  MRN: [id number]  Referring provider: Navin Sanon DO  Dx:   Encounter Diagnosis     ICD-10-CM    1. Balance disorder  R26.89       2. Other abnormalities of gait and mobility  R26.89                     Subjective:     Objective: Pt arrived with SPC. She did not try any curbs yet. See treatment diary below:  See (+) VNG findings from 23. See (+) Orthostatic hypotension testing date 23)  Pt states she she is unable to step up a curb without at least light UE support. NMR:  -compensatory strategies   -head movement preceded by eye movement to visual target (compensatory saccades)  FA rigid    H: 90 sec, no dizziness provoked    V: 90 sec no dizziness provoked   -compensatory saccades/head turns during FWD gait, no dizziness provoked  -stepping from one foam beam to another for/rev step 2 cycles   -curbs w/o UE support:  6"  with SPC (up w/cane in R hand , down with cane in L hand) Lead up with L LE, lead down with R LE.    -full flights of steps 8 steps + 10 steps up/down with SPC and railing, switching cane to L on the way down. Contact guard via gait belt. HEP:  -VOR x 1 seated 60 sec H and V 2 sets 3 times daily     Assessment: Patient tolerated treatment well today. Focus of treatment changed to compensatory strategies in light of potential bilateral hypofunction, to allow patient to move her head in function without symptom provocation. Patient with known orthostatic hypotension. Pt encouraged to use SPC on curbs and steps so she can be independent without other UE support. Plan to continue PT x 1 month, and if no improvement in symptoms refer to Neurology with potential referral to [facility] or [facility] for rotary chair. Patient will continue to benefit from skilled OPPT services to reduce overall risk for falls and maximize function.        Copied from VNG note: 23 VNG IMPRESSIONS per Chelsea Boots: Abnormal VNG study. Oculomotor exam was normal. There is no evidence of BPPV-type nystagmus. Absent findings during calorics in conjunction with normal headshake testing suggest possible bilateral hypofunction. Other possibilities include medication effects and ME dysfunction (normal tympanograms obtained today). Ms. Samira Richard denied administration of vestibular suppressants or any medications outside of those documented with her EMR. Dizziness is likely polysensory. Other documented concern includes orthostatics (see vestibular therapy testing). Further vestibular testing for additional characterization of symptoms (vHIT, rotary chair) can be considered to confirm caloric findings and build upon today's results. AUDIOLOGIST RECOMMENDATIONS:  1.) Follow-up with referring provider to review today's results. If vHIT or rotational chair is recommended for further vestibular assessment is recommended, these can be completed at [facility] or [address] [place] [address].  2.) Fall precautions after vestibular system stimulation were discussed at length with the patient. Though most test effects are expected to subside within minutes, it was recommended that they allot extra time for ambulation, avoid rigorous activities, use handrails when available, and be cautious of poorly lit areas for the rest of the day. 3.) Continue to monitor dizziness symptoms. If symptoms worsen or fail to improve prior to follow-up with their referring provider, urgent medical attention should be considered. 4.) Ms. Samira Richard was reminded to be mindful of general wellness when considering dizziness etiologies, including: eating a healthy/balanced diet, proper hydration, stress reduction, proper sleep hygiene, maintenance of an exercise regimen, etc. They were encouraged to follow-up with their primary care physician for further guidance on these topics. Plan: Continue per plan of care.       HEP.   -Vor x 1  -added chair poses 11/14/23    Outcome Measures Initial Eval  9/27/23 9/28  See above       5xSTS 20.56 sec        TUG  - Regular  - Cognitive   23.38 sec  50.54 sec        10 meter defer        FGA 11/30        DGI defer        mCTSIB  - FTEO (firm)  - FTEC (firm)  - FTEO (foam)  - FTEC (foam)   30 sec  30 sec  30 sec  4.75 sec        6MWT defer        MSQ   39%                          Precautions:   Past Medical History:   Diagnosis Date    Arthritis     osteo    Autoimmune hepatitis ([address])     Back pain with radiation     to bilat legs    De Santiago esophagus     De Santiago's esophagus     Chronic GERD     Chronic uveitis     bilateral    CPAP (continuous positive airway pressure) dependence     Diabetes mellitus (HCC)     diet controlled    Disease of thyroid gland     hypo    Diverticulitis     Fibromyalgia syndrome     Fibromyalgia, primary     Hiatal hernia     Hx of colonoscopy 2016    EGD done simultaneously    Hyperlipidemia     Hypertension     Infectious viral hepatitis     PONV (postoperative nausea and vomiting)     Sinus congestion     Sleep apnea     CPAP rx, does not use mask

## 2023-12-15 ENCOUNTER — ANESTHESIA EVENT (OUTPATIENT)
Dept: ANESTHESIOLOGY | Facility: HOSPITAL | Age: 83
End: 2023-12-15

## 2023-12-15 ENCOUNTER — ANESTHESIA (OUTPATIENT)
Dept: ANESTHESIOLOGY | Facility: HOSPITAL | Age: 83
End: 2023-12-15

## 2023-12-16 DIAGNOSIS — E78.2 MIXED HYPERLIPIDEMIA: ICD-10-CM

## 2023-12-18 ENCOUNTER — ANESTHESIA EVENT (OUTPATIENT)
Dept: GASTROENTEROLOGY | Facility: AMBULARY SURGERY CENTER | Age: 83
End: 2023-12-18

## 2023-12-18 ENCOUNTER — HOSPITAL ENCOUNTER (OUTPATIENT)
Dept: GASTROENTEROLOGY | Facility: AMBULARY SURGERY CENTER | Age: 83
Setting detail: OUTPATIENT SURGERY
Discharge: HOME | End: 2023-12-18
Attending: INTERNAL MEDICINE | Admitting: INTERNAL MEDICINE
Payer: MEDICARE

## 2023-12-18 ENCOUNTER — ANESTHESIA (OUTPATIENT)
Dept: GASTROENTEROLOGY | Facility: AMBULARY SURGERY CENTER | Age: 83
End: 2023-12-18

## 2023-12-18 VITALS
SYSTOLIC BLOOD PRESSURE: 155 MMHG | DIASTOLIC BLOOD PRESSURE: 74 MMHG | HEART RATE: 86 BPM | OXYGEN SATURATION: 98 % | RESPIRATION RATE: 16 BRPM | TEMPERATURE: 97.4 F

## 2023-12-18 DIAGNOSIS — R19.4 CHANGE IN BOWEL HABIT: ICD-10-CM

## 2023-12-18 LAB — GLUCOSE SERPL-MCNC: 137 MG/DL (ref 65–140)

## 2023-12-18 PROCEDURE — 45380 COLONOSCOPY AND BIOPSY: CPT | Performed by: INTERNAL MEDICINE

## 2023-12-18 PROCEDURE — 45385 COLONOSCOPY W/LESION REMOVAL: CPT | Performed by: INTERNAL MEDICINE

## 2023-12-18 PROCEDURE — 82948 REAGENT STRIP/BLOOD GLUCOSE: CPT

## 2023-12-18 PROCEDURE — 88305 TISSUE EXAM BY PATHOLOGIST: CPT | Performed by: PATHOLOGY

## 2023-12-18 RX ORDER — PROPOFOL 10 MG/ML
INJECTION, EMULSION INTRAVENOUS CONTINUOUS PRN
Status: DISCONTINUED | OUTPATIENT
Start: 2023-12-18 | End: 2023-12-18

## 2023-12-18 RX ORDER — ATORVASTATIN CALCIUM 10 MG/1
10 TABLET, FILM COATED ORAL DAILY
Qty: 90 TABLET | Refills: 1 | Status: SHIPPED | OUTPATIENT
Start: 2023-12-18

## 2023-12-18 RX ORDER — SODIUM CHLORIDE, SODIUM LACTATE, POTASSIUM CHLORIDE, CALCIUM CHLORIDE 600; 310; 30; 20 MG/100ML; MG/100ML; MG/100ML; MG/100ML
125 INJECTION, SOLUTION INTRAVENOUS CONTINUOUS
Status: DISCONTINUED | OUTPATIENT
Start: 2023-12-18 | End: 2023-12-22 | Stop reason: HOSPADM

## 2023-12-18 RX ORDER — LIDOCAINE HYDROCHLORIDE 10 MG/ML
0.5 INJECTION, SOLUTION EPIDURAL; INFILTRATION; INTRACAUDAL; PERINEURAL ONCE AS NEEDED
Status: DISCONTINUED | OUTPATIENT
Start: 2023-12-18 | End: 2023-12-22 | Stop reason: HOSPADM

## 2023-12-18 RX ORDER — PROPOFOL 10 MG/ML
INJECTION, EMULSION INTRAVENOUS AS NEEDED
Status: DISCONTINUED | OUTPATIENT
Start: 2023-12-18 | End: 2023-12-18

## 2023-12-18 RX ORDER — LIDOCAINE HYDROCHLORIDE 10 MG/ML
INJECTION, SOLUTION EPIDURAL; INFILTRATION; INTRACAUDAL; PERINEURAL AS NEEDED
Status: DISCONTINUED | OUTPATIENT
Start: 2023-12-18 | End: 2023-12-18

## 2023-12-18 RX ADMIN — PROPOFOL 100 MCG/KG/MIN: 10 INJECTION, EMULSION INTRAVENOUS at 09:23

## 2023-12-18 RX ADMIN — PROPOFOL 30 MG: 10 INJECTION, EMULSION INTRAVENOUS at 09:28

## 2023-12-18 RX ADMIN — PROPOFOL 100 MG: 10 INJECTION, EMULSION INTRAVENOUS at 09:23

## 2023-12-18 RX ADMIN — LIDOCAINE HYDROCHLORIDE 50 MG: 10 INJECTION, SOLUTION EPIDURAL; INFILTRATION; INTRACAUDAL; PERINEURAL at 09:23

## 2023-12-18 RX ADMIN — SODIUM CHLORIDE, SODIUM LACTATE, POTASSIUM CHLORIDE, AND CALCIUM CHLORIDE 125 ML/HR: .6; .31; .03; .02 INJECTION, SOLUTION INTRAVENOUS at 08:08

## 2023-12-18 NOTE — ANESTHESIA POSTPROCEDURE EVALUATION
Post-Op Assessment Note    CV Status:  Stable  Pain Score: 0    Pain management: adequate       Mental Status:  Sleepy and arousable   Hydration Status:  Stable   PONV Controlled:  None   Airway Patency:  Patent and adequate     Post Op Vitals Reviewed: Yes      Staff: CRNA               BP      Temp      Pulse     Resp      SpO2

## 2023-12-18 NOTE — INTERVAL H&P NOTE
H&P reviewed. After examining the patient I find no changes in the patients condition since the H&P had been written.    Vitals:    12/18/23 0804   BP: 165/83   Pulse: 83   Resp: 16   Temp: (!) 97.4 °F (36.3 °C)   SpO2: 96%

## 2023-12-18 NOTE — ANESTHESIA PREPROCEDURE EVALUATION
Procedure:  COLONOSCOPY    Relevant Problems   ANESTHESIA (within normal limits)      CARDIO   (+) Essential hypertension   (+) Mixed hyperlipidemia      ENDO   (+) Acquired hypothyroidism      GI/HEPATIC   (+) Autoimmune hepatitis (HCC)   (+) Gastroesophageal reflux disease with esophagitis   (+) Hiatal hernia      MUSCULOSKELETAL   (+) Hiatal hernia      PULMONARY   (+) Sleep apnea      Digestive   (+) 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      Other   (+) S/P total knee arthroplasty, bilateral   (+) S/P total right hip arthroplasty        Physical Exam    Airway    Mallampati score: II  TM Distance: >3 FB  Neck ROM: full     Dental   No notable dental hx     Cardiovascular      Pulmonary      Other Findings  post-pubertal.      Anesthesia Plan  ASA Score- 2     Anesthesia Type- IV sedation with anesthesia with ASA Monitors.         Additional Monitors:     Airway Plan:            Plan Factors-Exercise tolerance (METS): >4 METS.    Chart reviewed. EKG reviewed.  Existing labs reviewed. Patient summary reviewed.    Patient is not a current smoker.              Induction-     Postoperative Plan-     Informed Consent- Anesthetic plan and risks discussed with patient.  I personally reviewed this patient with the CRNA. Discussed and agreed on the Anesthesia Plan with the CRNA..

## 2023-12-19 ENCOUNTER — APPOINTMENT (OUTPATIENT)
Facility: CLINIC | Age: 83
End: 2023-12-19
Payer: MEDICARE

## 2023-12-21 ENCOUNTER — APPOINTMENT (OUTPATIENT)
Facility: CLINIC | Age: 83
End: 2023-12-21
Payer: MEDICARE

## 2023-12-21 PROCEDURE — 88305 TISSUE EXAM BY PATHOLOGIST: CPT | Performed by: PATHOLOGY

## 2023-12-22 ENCOUNTER — TELEPHONE (OUTPATIENT)
Dept: GASTROENTEROLOGY | Facility: CLINIC | Age: 83
End: 2023-12-22

## 2023-12-22 ENCOUNTER — TELEPHONE (OUTPATIENT)
Age: 83
End: 2023-12-22

## 2023-12-22 NOTE — RESULT ENCOUNTER NOTE
Please inform patient that their biopsies were benign.  Some adenomas were present however due to age no further surveillance colonoscopies.  She should continue with her MiraLAX and Metamucil if any symptoms persist please schedule follow-up office visit.

## 2023-12-22 NOTE — TELEPHONE ENCOUNTER
Patients GI provider:  ZEYNEP Maldonado    Number to return call: [phone]    Reason for call: Pt returning Jane's call. Pt would like Jane to call her back today if possible.    Scheduled procedure/appointment date if applicable: N/A

## 2023-12-26 NOTE — RESULT ENCOUNTER NOTE
Patient was informed via my chart biopsies were benign.  No further surveillance colonoscopies due to age.

## 2023-12-27 ENCOUNTER — APPOINTMENT (OUTPATIENT)
Facility: CLINIC | Age: 83
End: 2023-12-27
Payer: MEDICARE

## 2023-12-28 ENCOUNTER — RA CDI HCC (OUTPATIENT)
Dept: OTHER | Facility: HOSPITAL | Age: 83
End: 2023-12-28

## 2023-12-28 ENCOUNTER — APPOINTMENT (OUTPATIENT)
Facility: CLINIC | Age: 83
End: 2023-12-28
Payer: MEDICARE

## 2023-12-28 ENCOUNTER — OFFICE VISIT (OUTPATIENT)
Dept: FAMILY MEDICINE CLINIC | Facility: CLINIC | Age: 83
End: 2023-12-28
Payer: MEDICARE

## 2023-12-28 VITALS
WEIGHT: 147 LBS | HEIGHT: 58 IN | RESPIRATION RATE: 18 BRPM | HEART RATE: 76 BPM | TEMPERATURE: 97.6 F | BODY MASS INDEX: 30.86 KG/M2 | OXYGEN SATURATION: 96 % | DIASTOLIC BLOOD PRESSURE: 64 MMHG | SYSTOLIC BLOOD PRESSURE: 118 MMHG

## 2023-12-28 DIAGNOSIS — J20.9 ACUTE BRONCHITIS, UNSPECIFIED ORGANISM: Primary | ICD-10-CM

## 2023-12-28 LAB
SL AMB POCT RAPID FLU A: NORMAL
SL AMB POCT RAPID FLU B: NORMAL

## 2023-12-28 PROCEDURE — 99213 OFFICE O/P EST LOW 20 MIN: CPT | Performed by: FAMILY MEDICINE

## 2023-12-28 PROCEDURE — 87804 INFLUENZA ASSAY W/OPTIC: CPT | Performed by: FAMILY MEDICINE

## 2023-12-28 RX ORDER — BENZONATATE 200 MG/1
200 CAPSULE ORAL 2 TIMES DAILY PRN
Qty: 20 CAPSULE | Refills: 0 | Status: SHIPPED | OUTPATIENT
Start: 2023-12-28

## 2023-12-28 RX ORDER — AMOXICILLIN 875 MG/1
875 TABLET, COATED ORAL 2 TIMES DAILY
Qty: 14 TABLET | Refills: 0 | Status: SHIPPED | OUTPATIENT
Start: 2023-12-28 | End: 2024-01-04

## 2023-12-28 NOTE — PROGRESS NOTES
Name: Rosa Kapoor      : 1940      MRN: [id number]  Encounter Provider: Emma Napier DO  Encounter Date: 2023   Encounter department: [facility]    Assessment & Plan     1. Acute bronchitis, unspecified organism  -     POCT rapid flu A and B  -     amoxicillin (AMOXIL) 875 mg tablet; Take 1 tablet (875 mg total) by mouth 2 (two) times a day for 7 days  -     benzonatate (TESSALON) 200 MG capsule; Take 1 capsule (200 mg total) by mouth 2 (two) times a day as needed for cough     Return if symptoms worsen or fail to improve.    Subjective      Home COVID 19 test negative this morning    Symptoms started on the . She has been achy and not feeling well.     Sore Throat   Associated symptoms include coughing.   Generalized Body Aches  Associated symptoms include a sore throat, a fever (101 the first day, temp was 99.6 last night) and coughing.   Cough  Associated symptoms include chills, a fever (101 the first day, temp was 99.6 last night) and a sore throat.     Review of Systems   Constitutional:  Positive for chills and fever (101 the first day, temp was 99.6 last night).   HENT:  Positive for sore throat.    Respiratory:  Positive for cough.        Current Outpatient Medications on File Prior to Visit   Medication Sig   • acetaminophen (TYLENOL) 325 mg tablet Take 650 mg by mouth every 6 (six) hours as needed for mild pain.   • amLODIPine (NORVASC) 5 mg tablet TAKE ONE TABLET BY MOUTH EVERY MORNING   • atorvastatin (LIPITOR) 10 mg tablet TAKE ONE TABLET BY MOUTH EVERY DAY   • carvedilol (COREG) 12.5 mg tablet TAKE ONE TABLET BY MOUTH TWICE A DAY WITH MEALS   • carvedilol (COREG) 3.125 mg tablet Take 3.125 mg by mouth 2 (two) times a day with meals   • famotidine (PEPCID) 20 mg tablet TAKE ONE TABLET BY MOUTH TWICE A DAY   • gabapentin (Neurontin) 100 mg capsule Take 1 capsule (100 mg total) by mouth daily at bedtime   • levothyroxine 75 mcg tablet TAKE ONE TABLET BY MOUTH IN THE  "MORNING   • lisinopril (ZESTRIL) 40 mg tablet TAKE ONE TABLET BY MOUTH EVERY DAY   • polyethylene glycol (MIRALAX) 17 g packet Take 17 g by mouth every other day       Objective     /64   Pulse 76   Temp 97.6 °F (36.4 °C) (Temporal)   Resp 18   Ht 4' 10\" (1.473 m)   Wt 66.7 kg (147 lb)   SpO2 96%   BMI 30.72 kg/m²     Physical Exam  Vitals and nursing note reviewed.   Constitutional:       Appearance: She is well-developed.   HENT:      Head: Normocephalic and atraumatic.      Right Ear: Tympanic membrane and external ear normal.      Left Ear: Tympanic membrane and external ear normal.   Cardiovascular:      Rate and Rhythm: Normal rate and regular rhythm.      Heart sounds: Normal heart sounds. No murmur heard.     No friction rub.   Pulmonary:      Effort: Pulmonary effort is normal. No respiratory distress.      Breath sounds: Normal breath sounds. No wheezing or rales.   Musculoskeletal:      Right lower leg: No edema.      Left lower leg: No edema.       Emma Napier, DO    "

## 2024-01-03 DIAGNOSIS — J20.9 ACUTE BRONCHITIS, UNSPECIFIED ORGANISM: Primary | ICD-10-CM

## 2024-01-03 RX ORDER — PREDNISONE 20 MG/1
40 TABLET ORAL DAILY
Qty: 10 TABLET | Refills: 0 | Status: SHIPPED | OUTPATIENT
Start: 2024-01-03 | End: 2024-01-08

## 2024-01-04 ENCOUNTER — APPOINTMENT (OUTPATIENT)
Facility: CLINIC | Age: 84
End: 2024-01-04
Payer: MEDICARE

## 2024-01-09 ENCOUNTER — APPOINTMENT (OUTPATIENT)
Facility: CLINIC | Age: 84
End: 2024-01-09
Payer: MEDICARE

## 2024-01-23 ENCOUNTER — OFFICE VISIT (OUTPATIENT)
Dept: FAMILY MEDICINE CLINIC | Facility: CLINIC | Age: 84
End: 2024-01-23
Payer: MEDICARE

## 2024-01-23 VITALS
DIASTOLIC BLOOD PRESSURE: 68 MMHG | BODY MASS INDEX: 32.44 KG/M2 | HEART RATE: 70 BPM | HEIGHT: 56 IN | RESPIRATION RATE: 18 BRPM | WEIGHT: 144.2 LBS | OXYGEN SATURATION: 97 % | TEMPERATURE: 98.3 F | SYSTOLIC BLOOD PRESSURE: 130 MMHG

## 2024-01-23 DIAGNOSIS — I10 ESSENTIAL HYPERTENSION: ICD-10-CM

## 2024-01-23 DIAGNOSIS — Z00.00 MEDICARE ANNUAL WELLNESS VISIT, SUBSEQUENT: Primary | ICD-10-CM

## 2024-01-23 DIAGNOSIS — K75.4 AUTOIMMUNE HEPATITIS (HCC): ICD-10-CM

## 2024-01-23 DIAGNOSIS — E03.9 ACQUIRED HYPOTHYROIDISM: ICD-10-CM

## 2024-01-23 PROCEDURE — G0439 PPPS, SUBSEQ VISIT: HCPCS | Performed by: FAMILY MEDICINE

## 2024-01-23 NOTE — PROGRESS NOTES
Assessment and Plan:     Problem List Items Addressed This Visit     Acquired hypothyroidism    Relevant Orders    T4, free    TSH, 3rd generation    Autoimmune hepatitis (HCC)     Stable          Essential hypertension     Blood pressure currently well-controlled.  Concerned that her previous dizziness may have been related to her blood pressure dropping.  Her disease recently has been better.  Advised her that if needed she could take half dose of her amlodipine instead of the full 5 mg if her dizziness returns         Relevant Orders    CBC    Comprehensive metabolic panel    Lipid Panel with Direct LDL reflex   Other Visit Diagnoses     Medicare annual wellness visit, subsequent    -  Primary           Preventive health issues were discussed with patient, and age appropriate screening tests were ordered as noted in patient's After Visit Summary.  Personalized health advice and appropriate referrals for health education or preventive services given if needed, as noted in patient's After Visit Summary.     History of Present Illness:     Patient presents for a Medicare Wellness Visit    Patient reports that she still tired from the illness that she had over the holidays, although she is 95% better.  Still slight residual cough.  She is ready to go back to physical therapy and has scheduled appointments for her and her .    Reports that her dizziness has improved.       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   • Autoimmune hepatitis (HCC)   • Lymphocytic colitis   • Overweight   • Slow transit constipation   • Acquired hypothyroidism   • Essential hypertension   • Mixed hyperlipidemia   • Hiatal hernia   • Mixed  stress and urge urinary incontinence   • S/P total knee arthroplasty, bilateral   • S/P total right hip arthroplasty   • Sleep apnea   • Closed nondisplaced fracture of base of first metacarpal bone of right hand      Past Medical and Surgical History:     Past Medical History:   Diagnosis Date   • Arthritis     osteo   • Autoimmune hepatitis (HCC)    • Back pain with radiation     to bilat legs   • De Santiago esophagus    • De Santiago's esophagus    • Chronic GERD    • Chronic uveitis     bilateral   • CPAP (continuous positive airway pressure) dependence    • Diabetes mellitus (HCC)     diet controlled   • Disease of thyroid gland     hypo   • Diverticulitis    • Femur fracture (HCC)    • Fibromyalgia syndrome    • Fibromyalgia, primary    • Fractures    • Hiatal hernia    • Hx of colonoscopy 2016    EGD done simultaneously   • Hyperlipidemia    • Hypertension    • Infectious viral hepatitis    • Osteoarthritis    • PONV (postoperative nausea and vomiting)    • Sinus congestion    • Sleep apnea     CPAP rx, does not use mask     Past Surgical History:   Procedure Laterality Date   • BILATERAL SALPINGOOPHORECTOMY  1996   • BREAST BIOPSY Right    • CARPAL TUNNEL RELEASE Right 2004   • CHOLECYSTECTOMY     • COLONOSCOPY     • FRACTURE SURGERY Right     ORIF femur, emily in place   • HAND SURGERY     • HIP SURGERY     • HYSTERECTOMY  1974    GOPAL   • JOINT REPLACEMENT Right 2004    knee   • JOINT REPLACEMENT Left 2007    knee   • JOINT REPLACEMENT Right 2011    hip   • JOINT REPLACEMENT Left 2017    knee   • KNEE SURGERY  2004,  2007   • NM XCAPSL CTRC RMVL INSJ IO LENS PROSTH W/O ECP Right 08/22/2016    Procedure: EXTRACTION EXTRACAPSULAR CATARACT PHACO INTRAOCULAR LENS (IOL);  Surgeon: Lionel Rudd MD;  Location: [facility] MAIN OR;  Service: Ophthalmology   • NM XCAPSL CTRC RMVL INSJ IO LENS PROSTH W/O ECP Left 11/26/2018    Procedure: EXTRACTION EXTRACAPSULAR CATARACT PHACO INTRAOCULAR LENS (IOL);  Surgeon: Lionel Rudd MD;   Location: [facility] MAIN OR;  Service: Ophthalmology   • UPPER GASTROINTESTINAL ENDOSCOPY        Family History:     Family History   Problem Relation Age of Onset   • Heart disease Mother    • Diverticulitis Mother    • Stroke Father    • COPD Sister    • Liver disease Sister    • Anesthesia problems Sister    • Diabetes Brother    • Cancer Brother    • Cancer Maternal Grandmother         breast   • Pancreatic cancer Cousin    • Cancer Maternal Grandfather    • Cancer Paternal Uncle       Social History:     Social History     Socioeconomic History   • Marital status: /Civil Union     Spouse name: None   • Number of children: None   • Years of education: None   • Highest education level: None   Occupational History   • None   Tobacco Use   • Smoking status: Never     Passive exposure: Past   • Smokeless tobacco: Never   Vaping Use   • Vaping status: Never Used   Substance and Sexual Activity   • Alcohol use: No   • Drug use: Never   • Sexual activity: Not Currently     Partners: Male     Birth control/protection: Condom Male   Other Topics Concern   • None   Social History Narrative   • None     Social Determinants of Health     Financial Resource Strain: Low Risk  (1/22/2024)    Overall Financial Resource Strain (CARDIA)    • Difficulty of Paying Living Expenses: Not very hard   Food Insecurity: Unknown (5/1/2022)    Received from [facility]    Hunger Vital Sign    • Worried About Running Out of Food in the Last Year: Never true    • Ran Out of Food in the Last Year: Not on file   Transportation Needs: No Transportation Needs (1/22/2024)    PRAPARE - Transportation    • Lack of Transportation (Medical): No    • Lack of Transportation (Non-Medical): No   Physical Activity: Unknown (5/1/2022)    Received from [facility]    Exercise Vital Sign    • Days of Exercise per Week: 1 day    • Minutes of Exercise per Session: Not on file   Stress: No Stress Concern Present (11/15/2021)    Received from  [facility]    [employer] - Occupational Stress Questionnaire    • Feeling of Stress : Not at all   Social Connections: Unknown (5/1/2022)    Received from [facility]    Social Connection and Isolation Panel [NHANES]    • Frequency of Communication with Friends and Family: More than three times a week    • Frequency of Social Gatherings with Friends and Family: Not on file    • Attends Quaker Services: Not on file    • Active Member of Clubs or Organizations: Not on file    • Attends Club or Organization Meetings: Not on file    • Marital Status: Not on file   Intimate Partner Violence: Unknown (5/1/2022)    Received from [facility]    Humiliation, Afraid, Rape, and Kick questionnaire    • Fear of Current or Ex-Partner: No    • Emotionally Abused: Not on file    • Physically Abused: Not on file    • Sexually Abused: Not on file   Housing Stability: Unknown (5/1/2022)    Received from [facility]    Housing Stability Vital Sign    • Unable to Pay for Housing in the Last Year: No    • Number of Places Lived in the Last Year: Not on file    • Unstable Housing in the Last Year: Not on file      Medications and Allergies:     Current Outpatient Medications   Medication Sig Dispense Refill   • acetaminophen (TYLENOL) 325 mg tablet Take 650 mg by mouth every 6 (six) hours as needed for mild pain.     • amLODIPine (NORVASC) 5 mg tablet TAKE ONE TABLET BY MOUTH EVERY MORNING 90 tablet 1   • atorvastatin (LIPITOR) 10 mg tablet TAKE ONE TABLET BY MOUTH EVERY DAY 90 tablet 1   • carvedilol (COREG) 12.5 mg tablet TAKE ONE TABLET BY MOUTH TWICE A DAY WITH MEALS 180 tablet 1   • carvedilol (COREG) 3.125 mg tablet Take 3.125 mg by mouth 2 (two) times a day with meals     • famotidine (PEPCID) 20 mg tablet TAKE ONE TABLET BY MOUTH TWICE A  tablet 1   • gabapentin (Neurontin) 100 mg capsule Take 1 capsule (100 mg total) by mouth daily at bedtime 90 capsule 1   • levothyroxine  75 mcg tablet TAKE ONE TABLET BY MOUTH IN THE MORNING 90 tablet 1   • lisinopril (ZESTRIL) 40 mg tablet TAKE ONE TABLET BY MOUTH EVERY DAY 90 tablet 1   • polyethylene glycol (MIRALAX) 17 g packet Take 17 g by mouth every other day       No current facility-administered medications for this visit.     Allergies   Allergen Reactions   • Biaxin [Clarithromycin] GI Intolerance   • Codeine Other (See Comments)     dizzy   • Latex Hives     Blisters and hives local reaction   • Levaquin [Levofloxacin In D5w] GI Intolerance   • Morphine And Related GI Intolerance   • Nsaids GI Intolerance   • Oxycodone Other (See Comments)     dizzy   • Percocet [Oxycodone-Acetaminophen] Other (See Comments)     dizzy   • Ultram [Tramadol Hcl] Other (See Comments)     dizzy      Immunizations:     Immunization History   Administered Date(s) Administered   • COVID-19 PFIZER VACCINE 0.3 ML IM 01/22/2021, 02/11/2021   • Influenza Split High Dose Preservative Free IM 08/30/2017, 09/24/2018   • Influenza, Seasonal Vaccine, Quadrivalent, Adjuvanted, .5e 09/28/2020   • Influenza, high dose seasonal 0.7 mL 09/29/2021, 09/16/2022, 10/20/2023   • Influenza, seasonal, injectable 10/01/2016   • Pneumococcal Conjugate 13-Valent 03/18/2017   • Pneumococcal Conjugate Vaccine 20-valent (Pcv20), Polysace 09/16/2022   • influenza, trivalent, adjuvanted 09/27/2019      Health Maintenance:         Topic Date Due   • Hepatitis C Screening  Completed         Topic Date Due   • COVID-19 Vaccine (3 - 2023-24 season) 09/01/2023      Medicare Screening Tests and Risk Assessments:     Rosa is here for her Subsequent Wellness visit.     Health Risk Assessment:   Patient rates overall health as good. Patient feels that their physical health rating is same. Patient is satisfied with their life. Eyesight was rated as same. Hearing was rated as same. Patient feels that their emotional and mental health rating is same. Patients states they are never, rarely angry.  Patient states they are sometimes unusually tired/fatigued. Pain experienced in the last 7 days has been some. Patient's pain rating has been 3/10. Patient states that she has experienced no weight loss or gain in last 6 months. I have had a respiratory ailment for the past 4 weeks.    Depression Screening:   PHQ-2 Score: 0      Fall Risk Screening:   In the past year, patient has experienced: no history of falling in past year      Urinary Incontinence Screening:   Patient has leaked urine accidently in the last six months.     Home Safety:  Patient has trouble with stairs inside or outside of their home. Patient has working smoke alarms and has working carbon monoxide detector. Home safety hazards include: none.     Nutrition:   Current diet is Regular, Low Cholesterol, Low Saturated Fat, Low Carb and Limited junk food.     Medications:   Patient is not currently taking any over-the-counter supplements. Patient is able to manage medications.     Activities of Daily Living (ADLs)/Instrumental Activities of Daily Living (IADLs):   Walk and transfer into and out of bed and chair?: Yes  Dress and groom yourself?: Yes    Bathe or shower yourself?: Yes    Feed yourself? Yes  Do your laundry/housekeeping?: Yes  Manage your money, pay your bills and track your expenses?: Yes  Make your own meals?: Yes    Do your own shopping?: Yes    Previous Hospitalizations:   Any hospitalizations or ED visits within the last 12 months?: No      Advance Care Planning:   Living will: Yes    Durable POA for healthcare: Yes    Advanced directive: Yes    Advanced directive counseling given: Yes    End of Life Decisions reviewed with patient: Yes    Provider agrees with end of life decisions: Yes      Cognitive Screening:   Provider or family/friend/caregiver concerned regarding cognition?: No    PREVENTIVE SCREENINGS      Cardiovascular Screening:    General: Screening Not Indicated and History Lipid Disorder      Diabetes Screening:      "General: Screening Current      Colorectal Cancer Screening:     General: Screening Not Indicated      Breast Cancer Screening:     General: Screening Not Indicated      Cervical Cancer Screening:    General: Screening Not Indicated      Osteoporosis Screening:    General: Screening Current      Abdominal Aortic Aneurysm (AAA) Screening:        General: Screening Not Indicated      Lung Cancer Screening:     General: Screening Not Indicated      Hepatitis C Screening:    General: Screening Current    Screening, Brief Intervention, and Referral to Treatment (SBIRT)    Screening  Typical number of drinks in a day: 0  Typical number of drinks in a week: 0  Interpretation: Low risk drinking behavior.    AUDIT-C Screenin) How often did you have a drink containing alcohol in the past year? monthly or less  2) How many drinks did you have on a typical day when you were drinking in the past year? 1 to 2  3) How often did you have 6 or more drinks on one occasion in the past year? never    AUDIT-C Score: 1  Interpretation: Score 0-2 (female): Negative screen for alcohol misuse    Single Item Drug Screening:  How often have you used an illegal drug (including marijuana) or a prescription medication for non-medical reasons in the past year? never    Single Item Drug Screen Score: 0  Interpretation: Negative screen for possible drug use disorder    Brief Intervention  Alcohol & drug use screenings were reviewed. No concerns regarding substance use disorder identified.     No results found.     Physical Exam:     /68   Pulse 70   Temp 98.3 °F (36.8 °C) (Temporal)   Resp 18   Ht 4' 7.75\" (1.416 m)   Wt 65.4 kg (144 lb 3.2 oz)   SpO2 97%   BMI 32.62 kg/m²     Physical Exam  Vitals and nursing note reviewed.   Constitutional:       Appearance: She is well-developed.   HENT:      Head: Normocephalic and atraumatic.      Right Ear: External ear normal.      Left Ear: External ear normal.      Nose: Nose normal. "   Cardiovascular:      Rate and Rhythm: Normal rate and regular rhythm.      Heart sounds: Normal heart sounds. No murmur heard.     No friction rub.   Pulmonary:      Effort: No respiratory distress.      Breath sounds: Normal breath sounds. No wheezing or rales.   Musculoskeletal:      Right lower leg: No edema.      Left lower leg: No edema.   Neurological:      Mental Status: She is oriented to person, place, and time.      Cranial Nerves: No cranial nerve deficit.          Emma Napier,

## 2024-01-23 NOTE — ASSESSMENT & PLAN NOTE
Blood pressure currently well-controlled.  Concerned that her previous dizziness may have been related to her blood pressure dropping.  Her disease recently has been better.  Advised her that if needed she could take half dose of her amlodipine instead of the full 5 mg if her dizziness returns

## 2024-01-23 NOTE — PATIENT INSTRUCTIONS
Advised to take 1/2 of an amlodipine if your dizziness returns to help increase your blood pressure.     Medicare Preventive Visit Patient Instructions  Thank you for completing your Welcome to Medicare Visit or Medicare Annual Wellness Visit today. Your next wellness visit will be due in one year (1/23/2025).  The screening/preventive services that you may require over the next 5-10 years are detailed below. Some tests may not apply to you based off risk factors and/or age. Screening tests ordered at today's visit but not completed yet may show as past due. Also, please note that scanned in results may not display below.  Preventive Screenings:  Service Recommendations Previous Testing/Comments   Colorectal Cancer Screening  * Colonoscopy    * Fecal Occult Blood Test (FOBT)/Fecal Immunochemical Test (FIT)  * Fecal DNA/Cologuard Test  * Flexible Sigmoidoscopy Age: 45-75 years old   Colonoscopy: every 10 years (may be performed more frequently if at higher risk)  OR  FOBT/FIT: every 1 year  OR  Cologuard: every 3 years  OR  Sigmoidoscopy: every 5 years  Screening may be recommended earlier than age 45 if at higher risk for colorectal cancer. Also, an individualized decision between you and your healthcare provider will decide whether screening between the ages of 76-85 would be appropriate. Colonoscopy: 12/18/2023  FOBT/FIT: Not on file  Cologuard: Not on file  Sigmoidoscopy: Not on file          Breast Cancer Screening Age: 40+ years old  Frequency: every 1-2 years  Not required if history of left and right mastectomy Mammogram: Not on file        Cervical Cancer Screening Between the ages of 21-29, pap smear recommended once every 3 years.   Between the ages of 30-65, can perform pap smear with HPV co-testing every 5 years.   Recommendations may differ for women with a history of total hysterectomy, cervical cancer, or abnormal pap smears in past. Pap Smear: Not on file    Screening Not Indicated   Hepatitis C  Screening Once for adults born between 1945 and 1965  More frequently in patients at high risk for Hepatitis C Hep C Antibody: 01/13/2023    Screening Current   Diabetes Screening 1-2 times per year if you're at risk for diabetes or have pre-diabetes Fasting glucose: 92 mg/dL (10/9/2023)  A1C: No results in last 5 years (No results in last 5 years)  Screening Current   Cholesterol Screening Once every 5 years if you don't have a lipid disorder. May order more often based on risk factors. Lipid panel: 10/09/2023    Screening Not Indicated  History Lipid Disorder     Other Preventive Screenings Covered by Medicare:  Abdominal Aortic Aneurysm (AAA) Screening: covered once if your at risk. You're considered to be at risk if you have a family history of AAA.  Lung Cancer Screening: covers low dose CT scan once per year if you meet all of the following conditions: (1) Age 55-77; (2) No signs or symptoms of lung cancer; (3) Current smoker or have quit smoking within the last 15 years; (4) You have a tobacco smoking history of at least 20 pack years (packs per day multiplied by number of years you smoked); (5) You get a written order from a healthcare provider.  Glaucoma Screening: covered annually if you're considered high risk: (1) You have diabetes OR (2) Family history of glaucoma OR (3)  aged 50 and older OR (4)  American aged 65 and older  Osteoporosis Screening: covered every 2 years if you meet one of the following conditions: (1) You're estrogen deficient and at risk for osteoporosis based off medical history and other findings; (2) Have a vertebral abnormality; (3) On glucocorticoid therapy for more than 3 months; (4) Have primary hyperparathyroidism; (5) On osteoporosis medications and need to assess response to drug therapy.   Last bone density test (DXA Scan): 08/20/2019.  HIV Screening: covered annually if you're between the age of 15-65. Also covered annually if you are younger than 15  and older than 65 with risk factors for HIV infection. For pregnant patients, it is covered up to 3 times per pregnancy.    Immunizations:  Immunization Recommendations   Influenza Vaccine Annual influenza vaccination during flu season is recommended for all persons aged >= 6 months who do not have contraindications   Pneumococcal Vaccine   * Pneumococcal conjugate vaccine = PCV13 (Prevnar 13), PCV15 (Vaxneuvance), PCV20 (Prevnar 20)  * Pneumococcal polysaccharide vaccine = PPSV23 (Pneumovax) Adults 19-63 yo with certain risk factors or if 65+ yo  If never received any pneumonia vaccine: recommend Prevnar 20 (PCV20)  Give PCV20 if previously received 1 dose of PCV13 or PPSV23   Hepatitis B Vaccine 3 dose series if at intermediate or high risk (ex: diabetes, end stage renal disease, liver disease)   Respiratory syncytial virus (RSV) Vaccine - COVERED BY MEDICARE PART D  * RSVPreF3 (Arexvy) CDC recommends that adults 60 years of age and older may receive a single dose of RSV vaccine using shared clinical decision-making (SCDM)   Tetanus (Td) Vaccine - COST NOT COVERED BY MEDICARE PART B Following completion of primary series, a booster dose should be given every 10 years to maintain immunity against tetanus. Td may also be given as tetanus wound prophylaxis.   Tdap Vaccine - COST NOT COVERED BY MEDICARE PART B Recommended at least once for all adults. For pregnant patients, recommended with each pregnancy.   Shingles Vaccine (Shingrix) - COST NOT COVERED BY MEDICARE PART B  2 shot series recommended in those 19 years and older who have or will have weakened immune systems or those 50 years and older     Health Maintenance Due:      Topic Date Due    Hepatitis C Screening  Completed     Immunizations Due:      Topic Date Due    COVID-19 Vaccine (3 - 2023-24 season) 09/01/2023     Advance Directives   What are advance directives?  Advance directives are legal documents that state your wishes and plans for medical care.  These plans are made ahead of time in case you lose your ability to make decisions for yourself. Advance directives can apply to any medical decision, such as the treatments you want, and if you want to donate organs.   What are the types of advance directives?  There are many types of advance directives, and each state has rules about how to use them. You may choose a combination of any of the following:  Living will:  This is a written record of the treatment you want. You can also choose which treatments you do not want, which to limit, and which to stop at a certain time. This includes surgery, medicine, IV fluid, and tube feedings.   Durable power of  for healthcare (DPAHC):  This is a written record that states who you want to make healthcare choices for you when you are unable to make them for yourself. This person, called a proxy, is usually a family member or a friend. You may choose more than 1 proxy.  Do not resuscitate (DNR) order:  A DNR order is used in case your heart stops beating or you stop breathing. It is a request not to have certain forms of treatment, such as CPR. A DNR order may be included in other types of advance directives.  Medical directive:  This covers the care that you want if you are in a coma, near death, or unable to make decisions for yourself. You can list the treatments you want for each condition. Treatment may include pain medicine, surgery, blood transfusions, dialysis, IV or tube feedings, and a ventilator (breathing machine).  Values history:  This document has questions about your views, beliefs, and how you feel and think about life. This information can help others choose the care that you would choose.  Why are advance directives important?  An advance directive helps you control your care. Although spoken wishes may be used, it is better to have your wishes written down. Spoken wishes can be misunderstood, or not followed. Treatments may be given even if you  do not want them. An advance directive may make it easier for your family to make difficult choices about your care.   Urinary Incontinence   Urinary incontinence (UI)  is when you lose control of your bladder. UI develops because your bladder cannot store or empty urine properly. The 3 most common types of UI are stress incontinence, urge incontinence, or both.  Medicines:   May be given to help strengthen your bladder control. Report any side effects of medication to your healthcare provider.  Do pelvic muscle exercises often:  Your pelvic muscles help you stop urinating. Squeeze these muscles tight for 5 seconds, then relax for 5 seconds. Gradually work up to squeezing for 10 seconds. Do 3 sets of 15 repetitions a day, or as directed. This will help strengthen your pelvic muscles and improve bladder control.  Train your bladder:  Go to the bathroom at set times, such as every 2 hours, even if you do not feel the urge to go. You can also try to hold your urine when you feel the urge to go. For example, hold your urine for 5 minutes when you feel the urge to go. As that becomes easier, hold your urine for 10 minutes.   Self-care:   Keep a UI record.  Write down how often you leak urine and how much you leak. Make a note of what you were doing when you leaked urine.  Drink liquids as directed. You may need to limit the amount of liquid you drink to help control your urine leakage. Do not drink any liquid right before you go to bed. Limit or do not have drinks that contain caffeine or alcohol.   Prevent constipation.  Eat a variety of high-fiber foods. Good examples are high-fiber cereals, beans, vegetables, and whole-grain breads. Walking is the best way to trigger your intestines to have a bowel movement.  Exercise regularly and maintain a healthy weight.  Weight loss and exercise will decrease pressure on your bladder and help you control your leakage.   Use a catheter as directed  to help empty your bladder. A  catheter is a tiny, plastic tube that is put into your bladder to drain your urine.   Go to behavior therapy as directed.  Behavior therapy may be used to help you learn to control your urge to urinate.    Weight Management   Why it is important to manage your weight:  Being overweight increases your risk of health conditions such as heart disease, high blood pressure, type 2 diabetes, and certain types of cancer. It can also increase your risk for osteoarthritis, sleep apnea, and other respiratory problems. Aim for a slow, steady weight loss. Even a small amount of weight loss can lower your risk of health problems.  How to lose weight safely:  A safe and healthy way to lose weight is to eat fewer calories and get regular exercise. You can lose up about 1 pound a week by decreasing the number of calories you eat by 500 calories each day.   Healthy meal plan for weight management:  A healthy meal plan includes a variety of foods, contains fewer calories, and helps you stay healthy. A healthy meal plan includes the following:  Eat whole-grain foods more often.  A healthy meal plan should contain fiber. Fiber is the part of grains, fruits, and vegetables that is not broken down by your body. Whole-grain foods are healthy and provide extra fiber in your diet. Some examples of whole-grain foods are whole-wheat breads and pastas, oatmeal, brown rice, and bulgur.  Eat a variety of vegetables every day.  Include dark, leafy greens such as spinach, kale, ayanna greens, and mustard greens. Eat yellow and orange vegetables such as carrots, sweet potatoes, and winter squash.   Eat a variety of fruits every day.  Choose fresh or canned fruit (canned in its own juice or light syrup) instead of juice. Fruit juice has very little or no fiber.  Eat low-fat dairy foods.  Drink fat-free (skim) milk or 1% milk. Eat fat-free yogurt and low-fat cottage cheese. Try low-fat cheeses such as mozzarella and other reduced-fat  cheeses.  Choose meat and other protein foods that are low in fat.  Choose beans or other legumes such as split peas or lentils. Choose fish, skinless poultry (chicken or turkey), or lean cuts of red meat (beef or pork). Before you cook meat or poultry, cut off any visible fat.   Use less fat and oil.  Try baking foods instead of frying them. Add less fat, such as margarine, sour cream, regular salad dressing and mayonnaise to foods. Eat fewer high-fat foods. Some examples of high-fat foods include french fries, doughnuts, ice cream, and cakes.  Eat fewer sweets.  Limit foods and drinks that are high in sugar. This includes candy, cookies, regular soda, and sweetened drinks.  Exercise:  Exercise at least 30 minutes per day on most days of the week. Some examples of exercise include walking, biking, dancing, and swimming. You can also fit in more physical activity by taking the stairs instead of the elevator or parking farther away from stores. Ask your healthcare provider about the best exercise plan for you.      © Copyright [employer] 2018 Information is for End User's use only and may not be sold, redistributed or otherwise used for commercial purposes. All illustrations and images included in CareNotes® are the copyrighted property of [employer] or [employer]

## 2024-02-05 NOTE — PROGRESS NOTES
PT Re-Evaluation          POC expires Auth Status Total   Visits  Start date  Expiration date PT/OT + Visit Limit? Co-Insurance   23 N/A N/A N/A N/A PT, MAJO No and $0 Co-pay                                                  Today's date: 2024  Patient name: Rosa Kapoor  : 1940  MRN: [id number]  Referring provider: Emma Napier DO  Dx:   Encounter Diagnosis     ICD-10-CM    1. Balance disorder  R26.89       2. Other abnormalities of gait and mobility  R26.89                 Assessment      Updated Progress Note 24:  Restart of PT after extended break due to unknown viral illness requiring several weeks to recover.  Patient returns after absence since 23.   Objective measures indicated that her gait speed over 10 M does not meet age norm, but qualifies her as a community ambulator.  During 6 MWT she had to rest during the test due to LBP.  Her score of 600 feet is well below age norm of 1286 feet.  Bales Score of 53/56 indicates no increased risk for falls.  She displays only slowing during gait with head turns, not veering. FGA score of 22/30 indicates increased fall risk.  5XSTS score has declined since pt was last here, to 15.42 sec, and is slower than age norm.  TUG regular is within safety range, but there was a 26% cost for TUG cog, with multiple math errors counting backwards by 3's. Patient would benefit from continued PT with focus on posterior pelvic tilting during walking/standing/function to prevent onset of LBP that is likely due to stenosis. Focus also on mental dual tasking for improved safety in home and community.  Advise continued PT per POC.  Patient is in agreement with plan.        UPDATED 23: Rosa had VNG this afternoon and had no response to calorics which could indicate bilateral hypofunction. Otherwise no abnormal findings from VNG. She demonstrates improvements in 5x STS and is now deemed low  "fall risk. She remains high fall risk per TUG and TUG cog, however dual task cost is decreasing. She also remains high fall risk per FGA and gait speed. Gait speed indicates limited community ambulator. She continues to veer with head turns during gait. She tested + for orthostatic hypotension last visit and we reached out to PCP regarding it. Her chief complaint at this time is imbalance more than the dizziness. Discussed plan moving forward to focus on dynamic balance and gait for the next month, and then re-assess for improvements. Pt in agreement with this plan.     UPDATED 11/2/23: Rosa has attended PT for the past month for the diagnoses above. She reports feeling more agile since starting PT. She continues to report dizziness and has tested (+) for orthostatic hypotension, and displays significant motion sensitivity.    Objective measures:  5XSTS improved from 20 to 14.5 seconds improved by more than the MDC 2.3 sec.  (14.8 sec for age/gender norm.)  TUG improved from 23 to 12 seconds, more than 4.4 MDC and better than 13.5 safety cutoff.   TUG cog improved from 50 to 18 seconds.  FGA improved from 11 to 20/30. She remains high fall risk. She continues to demonstrate significant veering during gait with horizontal head turns. She must slow prior to stepping over a 6\" obstacles.   Bales score 46/56 indicates she remains high fall risk.      Goal completion:  Patient has met several  STG goals  and 1 LTG with progress towards additional goals, at this time.     Recommendations:    Advise continued PT per POC, to continue vestibular therapy with habituation, and dynamic gait/balance training to improve mobility to safe level in the community.   Rosa is in aggreement with this plan.   Plan for 6MWT next visit.     Initial PT Assessment details: Patient is a 83 y.o. Female who presents to skilled outpatient PT with imbalance, dizziness and difficulty with stair negotiation affecting her functional indpendence and " ability to participate in her community . Patient's past medical history significant for falls, controlled HTN, and substantial surgical history. She demonstrates deficits in muscular strength, ambulatory safety, dual tasking, dynamic and static balance as per scores on 5x STS, TUG and TUG cog, FGA, and mCTSIB respectively. As per APTA and Rehab Measure cuttoff scores, Rosa categorizes at a HIGH risk for falls with respect to the aforementioned outcome measures. Importantly, patient with occurrence of subjective dizziness symptoms following position changes (supine to stand,) ambulation with head turns, backwards walking and activities where vision was occluded. This could indicate a vestibular system dysfunction, plan to perform oculomotor and cervical screen to assess possible vestibular system involvement, as well as screen for orthostatic hypotension. Patient demonstrates high reliance on visual system for dynamic and static balance activities further perpetuating her risk for falls. Unable to assess gait speed and cardiovascular endurance today due to time constraints, plan to assess in future session to appropriate educate on use of assisted device while ambulating in her community. She will benefit from skilled PT to target balance deficits to improve patient overall function, decrease risk for fall to promote independence and participation in her community.     Impairments: Abnormal coordination, Abnormal gait, Abnormal muscle tone, Abnormal or restricted ROM, Activity intolerance, Impaired balance, Impaired physical strength, Lacks appropriate HEP, Poor posture, Poor body mechanics, Pain with function, Safety issue, Weight-bearing intolerance, Abnormal movement, Difficulty understanding, Abnormal muscle firing  Understanding of Dx/Px/POC: Good  Prognosis: Good    Patient verbalized understanding of POC.         Please contact me if you have any questions or recommendations. Thank you for the referral and  the opportunity to share in Rosa Kapoor's care.        Plan  Plan details: balance training, strengthening, vestibular training  Patient would benefit from: Skilled PT  Planned modality interventions: Biofeedback, Cryotherapy, TENS, Thermotherapy  Planned therapy interventions: Abdominal trunk stabilization, ADL training, Balance, Balance/WB training, Breathing training, Body mechanics training, Coordination, Functional ROM exercises, Gait training, HEP, Joint Mobilization, Manual Therapy, Felix taping, Motor coordination training, Neuromuscular re-education, Patient education, Postural training, Strengthening, Stretching, Therapeutic activities, Therapeutic exercises, Therapeutic training, Transfer training, Activity modification, Work reintegration  Frequency: 2x/wk  Duration in weeks: 12  Plan of Care beginning date: 12/20/2023  Plan of Care expiration date: 12 weeks - 3/13/2024  Treatment plan discussed with: Patient and Family       Goals  Short Term Goals (4 weeks):    - Patient will improve time on TUG by 2.9 seconds to facilitate improved safety in all ambulation MET   - Patient will be independent in basic HEP 2-3 weeks -MET  - Patient will improve 5xSTS score by 2.3 seconds to promote improved LE functional strength needed for ADLs -MET   - Patient will reduce dual task cost to < 25% to improve balance and ability to dual task. -NOT MET but close 2/6/24    Long Term Goals (12 weeks):  - Patient will be independent in a comprehensive home exercise program-NOT MET  - Patient will improve gait speed to 1.0 m/s or greater to improve safety with community ambulation- NOT MET, IMPROVING  - Patient will improve scoring on FGA by 4 points to progress safety with dynamic tasks- MET   - Patient will be able to demonstrate HT in gait without veering-MET 2/6/24  - Patient will report going on walks at least 3 days per week to promote independence and improved cardiovascular endurance- NOT MET  - Patient will  report 50% reduction in near falls when ambulating on uneven terrain- NOT MET  - Patient will reduce dual task cost to < 10% to improve balance and ability to dual task. - NOT MET  - Patient will increase FGA score to 25/30 to reduce fall risk.       Cut off score    All date taken from APTA Neuro Section or Rehab Measures      Bales/56  MDC: 6 pts  Age Norms:  60-69: M - 55   F - 55  70-79: M - 54   F - 53  80-89: M - 53   F - 50 5xSTS: Mallory et al 2010  MDC: 2.3 sec  Age Norms:  60-69: 11.1 sec  70-79: 12.6 sec  80-89: 14.8 sec   TUG  MDC: 4.14 sec  Cut off score:  >13.5 sec community dwelling adults  >32.2 frail elderly  <20 I for basic transfers  >30 dependent on transfers 10 Meter Walk Test: Jennifer and Dilshad mckeon al 2011  MDC: 0.18 m/s  20-29: M - 1.35 m   F - 1.34 m  30-39: M - 1.43 m   F - 1.34 m  40-49: M - 1.43 m   F - 1.39 m  50-59: M - 1.43 m   F - 1.31 m  60-69: M - 1.34 m   F - 1.24 m  70-79: M - 1.26 m   F - 1.13 m  80-89: M - 0.97 m   F - 0.94 m    Household Ambulator < 0.4 m/s  Limited Community Ambulator 0.4 - 0.8 m/s  Community Ambulator 0.8 - 1.2 m/s  Safely cross the street > 1.2 m/s   FGA  MCID: 4 pts  Geriatrics/community < 22/30 fall risk  Geriatrics/community < 20/30 unexplained falls    DGI  MDC: vestibular - 4 pts  MDC: geriatric/community - 3 pts  Falls risk <19/24 mCTSIB  Norm: 20-60 yrs  Eyes open firm: norm sway 0.21-0.48  Eyes closed firm: norm sway 0.48-0.99  Eyes open foam: norm sway 0.38-0.71  Eyes closed foam: norm sway 0.70-2.22   6 Minute Walk Test  MDC: 190.98 ft  MCID: 164 ft    Age Norms  60-69: M - 1876 ft (571.80 m)  F - 1765 ft (537.98 m)  70-79: M - 1729 ft (527.00 m)  F - 1545 ft (470.92 m)  80-89: M - 1368 ft (416.97 m)  F - 1286 ft (391.97 m) ABC: Zaida & Carlos Manuel, 2003  <67% increased risk for falls         Subjective    Update 23: Pt reports she feels like her walking has actually gotten better. She had VNG today and she had no response to calorics which  "could indicate bilateral hypofunction (no central signs and negative for BPPV). She would like to be able to walk more, but sometimes her back hurts. Wants to be able to walk without \"solange-tottering\" outside in open environments.     Updated 23: Patient reports feeling more agile since starting PT one month ago.      Initial assessment:History of Present Illness  - Mechanism of injury: Patient is an 83 year old female presenting to PT with complaints of difficulty climbing stairs, imbalance and history of falls. Past medical history is significant for controlled HTN, L hip pain, and substantial surgical history. She reports she's only had one recent fall (2023) which occurred because she missed a step, lost her balance anteriorly and landed on her R hand and face. She is currently attending hand therapy for subsequent fracture in R hand. Rosa reports feelings of dizziness described as \"lightheaded and floaty\" that accompanies imbalance especially when waking up in the middle of the night and walking in the dark. She reports dizziness does not last longer than a couple of seconds and denies any falls proceeded by described dizzines. Patient does not currently ambulate with an AD but her daughter has purchased her a SPC due to her unsteadiness.     - Primary AD: daughter bought her a cane recently, no ad at home or in community.   - Assist level at home: independent  - Decreased fine motor tasks: No      Pain  - Current pain ratin/10  - At best pain ratin/10  - At worst pain ratin-7/10  - Location: L buttock, hip  - Aggravating factors: standing, morning    Social Support  - Steps to enter house: none, 1 garage steps  - Stairs in house: no steps, ranch style house  - Lives in: ranch style house  - Lives with:  and daughter visits occasionally    - Employment status: retired,  for [email or web address]  - Hand dominance: R handed    Treatments  - Previous treatment: Hand " therapy, ortho PT for shoulder   - Current treatment: Hand therapy  - Diagnostic Testing: x-ray from fall of hand.       Objective     LE MMT  - R Hip Flexion: 3+/5  L Hip Flexion: 4-/5  - R Hip Extension: 4/5  L Hip Extension: 4/5  - R Hip Abduction: 4/5  L Hip Abduction: 4/5  - R Hip Adduction: 4-/5  L Hip Adduction: 4+/5  - R Knee Extension: 4/5 (pain in R quad)  L Knee Extension: 5/5  - R Knee Flexion: 5/5  L Knee Flexion: 5/5  - R Ankle DF: 4-/5   L Ankle DF: 4/5  - R Ankle PF: 5/5   L Ankle PF: 5/5    Sensation  - Light touch: normla, B/L numbness at lateral aspects of shins    Coordination  - Heel to Shin: Normal, weakness evident  - Alternate Toe Taps: normal  - Finger to Nose: normal    Myelopathy Screen (>3/5 +)  - Lowry's Reflex: -  - Inverted Supinator Sign: -  - Age > 45: Yes  - Gait Deviation: No      Gait  - Abnormalities: decreased gait speed, decreased trunk rotation, decreased B/L foot clearance         Outcome Measures Initial Eval  9/27/23 PN  11/2/23 Re-eval  12/7/23 2/6/24     5xSTS 20.56 sec 14.5 s 12.70 sec 15.42 sec      TUG  - Regular  - Cognitive   23.38 sec  50.54 sec   -12.31  -18    46% dual task cost   13.94 sec  19.10 sec    37% dual task cost 12.08 sec  -15.23 with many math errors ( by 3's  26% cost      10 meter defer 12.06 sec 14.86 sec  0.67 m/s 11.77 sec   0.84 m/sec     FGA 11/30 20/30 19/30 6.58 sec  22/30  Increased fall risk       mCTSIB  - FTEO (firm)  - FTEC (firm)  - FTEO (foam)  - FTEC (foam)   30 sec  30 sec  30 sec  4.75 sec     NT           -30 sec EC foam      6MWT defer NT   600 feet  PO2 down to 94%  Pt needed sit rest due to LBP     Bales   46/56  53/56         PO2  96  HR 75 bpm            Precautions:   Past Medical History:   Diagnosis Date    Arthritis     osteo    Autoimmune hepatitis (HCC)     Back pain with radiation     to bilat legs    De Santiago esophagus     De Santiago's esophagus     Chronic GERD     Chronic uveitis     bilateral    CPAP (continuous  positive airway pressure) dependence     Diabetes mellitus (HCC)     diet controlled    Disease of thyroid gland     hypo    Diverticulitis     Femur fracture (HCC)     Fibromyalgia syndrome     Fibromyalgia, primary     Fractures     Hiatal hernia     Hx of colonoscopy 2016    EGD done simultaneously    Hyperlipidemia     Hypertension     Infectious viral hepatitis     Osteoarthritis     PONV (postoperative nausea and vomiting)     Sinus congestion     Sleep apnea     CPAP rx, does not use mask

## 2024-02-06 ENCOUNTER — EVALUATION (OUTPATIENT)
Facility: CLINIC | Age: 84
End: 2024-02-06
Payer: MEDICARE

## 2024-02-06 DIAGNOSIS — R26.89 BALANCE DISORDER: Primary | ICD-10-CM

## 2024-02-06 DIAGNOSIS — R26.89 OTHER ABNORMALITIES OF GAIT AND MOBILITY: ICD-10-CM

## 2024-02-06 PROCEDURE — 97112 NEUROMUSCULAR REEDUCATION: CPT

## 2024-02-06 PROCEDURE — 97530 THERAPEUTIC ACTIVITIES: CPT

## 2024-02-08 ENCOUNTER — OFFICE VISIT (OUTPATIENT)
Facility: CLINIC | Age: 84
End: 2024-02-08
Payer: MEDICARE

## 2024-02-08 DIAGNOSIS — R26.89 OTHER ABNORMALITIES OF GAIT AND MOBILITY: ICD-10-CM

## 2024-02-08 DIAGNOSIS — R26.89 BALANCE DISORDER: Primary | ICD-10-CM

## 2024-02-08 PROCEDURE — 97112 NEUROMUSCULAR REEDUCATION: CPT

## 2024-02-08 NOTE — PROGRESS NOTES
"Daily Note     Today's date: 2024  Patient name: Rosa Kapoor  : 1940  MRN: [id number]  Referring provider: Emma Napier DO  Dx:   Encounter Diagnosis     ICD-10-CM    1. Balance disorder  R26.89       2. Other abnormalities of gait and mobility  R26.89                     Subjective:     Objective: Patient reports to PT session with no new issues or complaints.     NMR:  - Standing hip 3-way x 20 reps B/L; 3 second iso hold (increased focus on core engagement)  - Core rotations w/ small TT on foam pad 2 sets x 1 minute  - Alternating step taps (6\") holding small TT 2 sets x 1 minute  - Marching in // bars x 5 laps down/back  - Ambulation w/ small TT carry at chest x 300 ft (performed 2nd half 150 ft no TT carry due to onset of LBP)      Assessment: Patient tolerated treatment well today. Focus of treatment shifted to core engagement and functional core strengthening activities due to patient's persistent back pain with prolonged activity/ambulation. Patient demonstrated decreased eccentric control with marching, likely suggesting reduced contribution of core for stability. Consider trialing posterior pelvic tilts and TA activation exercises in supine. Patient will continue to benefit from skilled OPPT services to reduce overall risk for falls and maximize function.        Plan: Continue per plan of care.      HEP.   -Vor x 1  -added chair poses 23    Outcome Measures Initial Eval  23 PN  23 Re-eval  23     5xSTS 20.56 sec 14.5 s 12.70 sec 15.42 sec      TUG  - Regular  - Cognitive   23.38 sec  50.54 sec   -12.31  -18    46% dual task cost   13.94 sec  19.10 sec    37% dual task cost 12.08 sec  -15.23 with many math errors ( by 3's  26% cost      10 meter defer 12.06 sec 14.86 sec  0.67 m/s 11.77 sec   0.84 m/sec     FGA  6.58 sec    Increased fall risk       mCTSIB  - FTEO (firm)  - FTEC (firm)  - FTEO (foam)  - FTEC (foam)   30 sec  30 sec  30 " sec  4.75 sec     NT           -30 sec EC foam      6MWT defer NT   600 feet  PO2 down to 94%  Pt needed sit rest due to LBP     Bales   46/56  53/56         PO2  96  HR 75 bpm                 Precautions:   Past Medical History:   Diagnosis Date    Arthritis     osteo    Autoimmune hepatitis (HCC)     Back pain with radiation     to bilat legs    De Santiago esophagus     De Santiago's esophagus     Chronic GERD     Chronic uveitis     bilateral    CPAP (continuous positive airway pressure) dependence     Diabetes mellitus (HCC)     diet controlled    Disease of thyroid gland     hypo    Diverticulitis     Fibromyalgia syndrome     Fibromyalgia, primary     Hiatal hernia     Hx of colonoscopy 2016    EGD done simultaneously    Hyperlipidemia     Hypertension     Infectious viral hepatitis     PONV (postoperative nausea and vomiting)     Sinus congestion     Sleep apnea     CPAP rx, does not use mask

## 2024-02-14 ENCOUNTER — OFFICE VISIT (OUTPATIENT)
Facility: CLINIC | Age: 84
End: 2024-02-14
Payer: MEDICARE

## 2024-02-14 DIAGNOSIS — R26.89 OTHER ABNORMALITIES OF GAIT AND MOBILITY: ICD-10-CM

## 2024-02-14 DIAGNOSIS — R26.89 BALANCE DISORDER: Primary | ICD-10-CM

## 2024-02-14 PROCEDURE — 97112 NEUROMUSCULAR REEDUCATION: CPT

## 2024-02-14 NOTE — PROGRESS NOTES
"Daily Note     Today's date: 2024  Patient name: Rosa Kapoor  : 1940  MRN: [id number]  Referring provider: Emma Napier DO  Dx:   Encounter Diagnosis     ICD-10-CM    1. Balance disorder  R26.89       2. Other abnormalities of gait and mobility  R26.89                       Subjective: Pt arrived 15 minutes late, due to just running late.     Objective: Patient reports to PT session with no new issues or complaints.   Precautions: 2019 imaging: grade 1 anterolisthesis L4 on L5, and L5 on S1.  Severe central canal narrowing at L4-5 and L5- S1. 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  Disc bulges and facet arthropathy, foraminal narrowing. L3-4 disk osteophyte complex. See report.     NMR:    - Core rotations w/ small TT on foam pad 2 sets x 1 minute  - Alternating step taps (6\") holding small TT 2 sets x 1 minute  - Ambulation w/ small TT carry at chest x 100 feet, onset of  midline low back fatigue (at 25 feet) and UE fatigue  -seated L UT stretch as need t/o session, for fatigue/pain holding TT, attempted unilateral touches to floor, but elbows on knees is most comfortable   - opp/hand to knee push 5 sec isometrics x 5 reps. Pt reports onset of L hip and L buttock pain after 2-3 reps.   - not today: Marching in // bars x 5 laps down/back  - not today: Standing hip 3-way x 20 reps B/L; 3 second iso hold (increased focus on core engagement)  -NV add PPT    Assessment: Patient tolerated treatment well today. Focus of treatment shifted to core engagement and functional core strengthening activities due to patient's persistent back pain with prolonged activity/ambulation.  Consider trialing posterior pelvic tilts and TA activation exercises in supine. Mild pain provoked with supine opp/hand knee push today. Patient will continue to benefit from skilled OPPT services to reduce overall risk for falls and maximize function.        Plan: Continue per plan of care.      HEP.    -Vor x " 1  -added chair poses 11/14/23    Outcome Measures Initial Eval  9/27/23 PN  11/2/23 Re-eval  12/7/23 2/6/24     5xSTS 20.56 sec 14.5 s 12.70 sec 15.42 sec      TUG  - Regular  - Cognitive   23.38 sec  50.54 sec   -12.31  -18    46% dual task cost   13.94 sec  19.10 sec    37% dual task cost 12.08 sec  -15.23 with many math errors ( by 3's  26% cost      10 meter defer 12.06 sec 14.86 sec  0.67 m/s 11.77 sec   0.84 m/sec     FGA 11/30 20/30 19/30 6.58 sec  22/30  Increased fall risk       mCTSIB  - FTEO (firm)  - FTEC (firm)  - FTEO (foam)  - FTEC (foam)   30 sec  30 sec  30 sec  4.75 sec     NT           -30 sec EC foam      6MWT defer NT   600 feet  PO2 down to 94%  Pt needed sit rest due to LBP     Bales   46/56  53/56         PO2  96  HR 75 bpm                 Precautions:   Past Medical History:   Diagnosis Date    Arthritis     osteo    Autoimmune hepatitis (HCC)     Back pain with radiation     to bilat legs    De Santiago esophagus     De Santiago's esophagus     Chronic GERD     Chronic uveitis     bilateral    CPAP (continuous positive airway pressure) dependence     Diabetes mellitus (HCC)     diet controlled    Disease of thyroid gland     hypo    Diverticulitis     Fibromyalgia syndrome     Fibromyalgia, primary     Hiatal hernia     Hx of colonoscopy 2016    EGD done simultaneously    Hyperlipidemia     Hypertension     Infectious viral hepatitis     PONV (postoperative nausea and vomiting)     Sinus congestion     Sleep apnea     CPAP rx, does not use mask

## 2024-02-20 ENCOUNTER — OFFICE VISIT (OUTPATIENT)
Facility: CLINIC | Age: 84
End: 2024-02-20
Payer: MEDICARE

## 2024-02-20 DIAGNOSIS — R26.89 BALANCE DISORDER: Primary | ICD-10-CM

## 2024-02-20 DIAGNOSIS — R26.89 OTHER ABNORMALITIES OF GAIT AND MOBILITY: ICD-10-CM

## 2024-02-20 PROCEDURE — 97112 NEUROMUSCULAR REEDUCATION: CPT

## 2024-02-20 NOTE — PROGRESS NOTES
"Daily Note     Today's date: 2024  Patient name: Rosa Kapoor  : 1940  MRN: [id number]  Referring provider: Emma Napier DO  Dx:   Encounter Diagnosis     ICD-10-CM    1. Balance disorder  R26.89       2. Other abnormalities of gait and mobility  R26.89                         Subjective: Patient present to PT with no new complaints, changes or falls    Objective: Patient reports to PT session with no new issues or complaints.   Precautions: 2019 imaging: grade 1 anterolisthesis L4 on L5, and L5 on S1.  Severe central canal narrowing at L4-5 and L5- S1. 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  Disc bulges and facet arthropathy, foraminal narrowing. L3-4 disk osteophyte complex. See report.     NMR:  - Core rotations w/ small TT on foam pad 2 sets x 1 minute  - Alternating step taps (6\") holding small TT 2 sets x 1.5 minutes  - Standing hip 3-way x 20 reps B/L; 3 second iso hold (increased focus on core engagement)  - Ambulation w/ small TT carry at chest x 100 feet  - Step-ups to medium riverrocks: 20x, 2 sets  - Fwd/bwd tandem ambulation in //bars: 2 laps    Assessment: Patient tolerated treatment well today with focus on balance training. Patient was most challenged by balance activities while on complaint surfaces. However, patient able to perform all exercises with limited upper extremity support for balance activities suggesting improvements in dynamic balance. Patient will continue to benefit from skilled OPPT services to reduce overall risk for falls and maximize function.        Plan: Continue per plan of care.      HEP.    -Vor x 1  -added chair poses 23    Outcome Measures Initial Eval  23 PN  23 Re-eval  23     5xSTS 20.56 sec 14.5 s 12.70 sec 15.42 sec      TUG  - Regular  - Cognitive   23.38 sec  50.54 sec   -12.31  -18    46% dual task cost   13.94 sec  19.10 sec    37% dual task cost 12.08 sec  -15.23 with many math errors ( by " 3's  26% cost      10 meter defer 12.06 sec 14.86 sec  0.67 m/s 11.77 sec   0.84 m/sec     FGA 11/30 20/30 19/30 6.58 sec  22/30  Increased fall risk       mCTSIB  - FTEO (firm)  - FTEC (firm)  - FTEO (foam)  - FTEC (foam)   30 sec  30 sec  30 sec  4.75 sec     NT           -30 sec EC foam      6MWT defer NT   600 feet  PO2 down to 94%  Pt needed sit rest due to LBP     Bales   46/56  53/56         PO2  96  HR 75 bpm                 Precautions:   Past Medical History:   Diagnosis Date    Arthritis     osteo    Autoimmune hepatitis (HCC)     Back pain with radiation     to bilat legs    De Santiago esophagus     De Santiago's esophagus     Chronic GERD     Chronic uveitis     bilateral    CPAP (continuous positive airway pressure) dependence     Diabetes mellitus (HCC)     diet controlled    Disease of thyroid gland     hypo    Diverticulitis     Fibromyalgia syndrome     Fibromyalgia, primary     Hiatal hernia     Hx of colonoscopy 2016    EGD done simultaneously    Hyperlipidemia     Hypertension     Infectious viral hepatitis     PONV (postoperative nausea and vomiting)     Sinus congestion     Sleep apnea     CPAP rx, does not use mask

## 2024-02-22 ENCOUNTER — OFFICE VISIT (OUTPATIENT)
Facility: CLINIC | Age: 84
End: 2024-02-22
Payer: MEDICARE

## 2024-02-22 DIAGNOSIS — R26.89 BALANCE DISORDER: Primary | ICD-10-CM

## 2024-02-22 DIAGNOSIS — R26.89 OTHER ABNORMALITIES OF GAIT AND MOBILITY: ICD-10-CM

## 2024-02-22 PROCEDURE — 97112 NEUROMUSCULAR REEDUCATION: CPT

## 2024-02-22 NOTE — PROGRESS NOTES
"Daily Note     Today's date: 2024  Patient name: Rosa Kapoor  : 1940  MRN: [id number]  Referring provider: Emma Napier DO  Dx:   Encounter Diagnosis     ICD-10-CM    1. Balance disorder  R26.89       2. Other abnormalities of gait and mobility  R26.89                         Subjective: Patient present to PT with no new complaints, changes or falls.    Objective: Patient reports to PT session with no new issues or complaints.   Precautions: 2019 imaging: grade 1 anterolisthesis L4 on L5, and L5 on S1.  Severe central canal narrowing at L4-5 and L5- S1. 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  Disc bulges and facet arthropathy, foraminal narrowing. L3-4 disk osteophyte complex. See report.     NMR:  - Step-ups 6\": 20x, 2 sets  - Core rotations w/ small TT on foam pad 2 sets x 1.5 minutes  - Alternating step taps (6\") holding small TT: 20x, 2 sets  - Side stepping on foam beam: 10ft, 4 laps  - Fwd tandem ambulation in //bars: 5 laps  - Bwd ambulation w/ 5.5# TT: 15ft, 2 laps    Assessment: Patient tolerated treatment well today with focus on balance training. Patient did not complain of increase low back or glute discomfort throughout session today suggesting improvement in activity tolerance. She demonstrated significant improvement in postural sway while on complaint surfaces. Patient was most challenged by backwards ambulation as she displayed a step-to gait pattern. Patient will continue to benefit from skilled OPPT services to reduce overall risk for falls and maximize function.    Plan: Continue per plan of care.      HEP.    -Vor x 1  -added chair poses 23    Outcome Measures Initial Eval  23 PN  23 Re-eval  23     5xSTS 20.56 sec 14.5 s 12.70 sec 15.42 sec      TUG  - Regular  - Cognitive   23.38 sec  50.54 sec   -12.31  -18    46% dual task cost   13.94 sec  19.10 sec    37% dual task cost 12.08 sec  -15.23 with many math errors ( by " 3's  26% cost      10 meter defer 12.06 sec 14.86 sec  0.67 m/s 11.77 sec   0.84 m/sec     FGA 11/30 20/30 19/30 6.58 sec  22/30  Increased fall risk       mCTSIB  - FTEO (firm)  - FTEC (firm)  - FTEO (foam)  - FTEC (foam)   30 sec  30 sec  30 sec  4.75 sec     NT           -30 sec EC foam      6MWT defer NT   600 feet  PO2 down to 94%  Pt needed sit rest due to LBP     Bales   46/56  53/56         PO2  96  HR 75 bpm                 Precautions:   Past Medical History:   Diagnosis Date    Arthritis     osteo    Autoimmune hepatitis (HCC)     Back pain with radiation     to bilat legs    De Santiago esophagus     De Santiago's esophagus     Chronic GERD     Chronic uveitis     bilateral    CPAP (continuous positive airway pressure) dependence     Diabetes mellitus (HCC)     diet controlled    Disease of thyroid gland     hypo    Diverticulitis     Fibromyalgia syndrome     Fibromyalgia, primary     Hiatal hernia     Hx of colonoscopy 2016    EGD done simultaneously    Hyperlipidemia     Hypertension     Infectious viral hepatitis     PONV (postoperative nausea and vomiting)     Sinus congestion     Sleep apnea     CPAP rx, does not use mask

## 2024-02-27 ENCOUNTER — OFFICE VISIT (OUTPATIENT)
Facility: CLINIC | Age: 84
End: 2024-02-27
Payer: MEDICARE

## 2024-02-27 DIAGNOSIS — R26.89 BALANCE DISORDER: Primary | ICD-10-CM

## 2024-02-27 DIAGNOSIS — R26.89 OTHER ABNORMALITIES OF GAIT AND MOBILITY: ICD-10-CM

## 2024-02-27 PROCEDURE — 97112 NEUROMUSCULAR REEDUCATION: CPT

## 2024-02-27 NOTE — PROGRESS NOTES
"Daily Note     Today's date: 2024  Patient name: Rosa Kapoor  : 1940  MRN: [id number]  Referring provider: Emma Napier DO  Dx:   Encounter Diagnosis     ICD-10-CM    1. Balance disorder  R26.89       2. Other abnormalities of gait and mobility  R26.89                           Subjective: Patient present to PT with no new complaints, changes or falls.    Objective: Patient reports to PT session with no new issues or complaints.   Precautions: 2019 imaging: grade 1 anterolisthesis L4 on L5, and L5 on S1.  Severe central canal narrowing at L4-5 and L5- S1. 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  Disc bulges and facet arthropathy, foraminal narrowing. L3-4 disk osteophyte complex. See report.     NMR:  - STS to fatigue: 11x, 12x  - Step-ups 6\" to fatigue: 10x, 15x, 0UE  - Alternating step taps (6\") holding small TT: 20x, 2 sets  - Core rotations w/ small TT on foam pad 2 sets x 1.5 min  - Side stepping on foam beam: 10ft, 6 laps    Assessment: Patient tolerated treatment well today with focus on balance training. Progressed exercises today to emphasize muscular endurance and improved tolerance to activity. Patient continues to be challenged while on complaint surfaces suggesting deficits in somatosensory awareness. No overt LOB displayed throughout session today. Patient will continue to benefit from skilled OPPT services to reduce overall risk for falls and maximize function.    Plan: Continue per plan of care.      HEP.    -Vor x 1  -added chair poses 23    Outcome Measures Initial Eval  23 PN  23 Re-eval  23     5xSTS 20.56 sec 14.5 s 12.70 sec 15.42 sec      TUG  - Regular  - Cognitive   23.38 sec  50.54 sec   -12.31  -18    46% dual task cost   13.94 sec  19.10 sec    37% dual task cost 12.08 sec  -15.23 with many math errors ( by 3's  26% cost      10 meter defer 12.06 sec 14.86 sec  0.67 m/s 11.77 sec   0.84 m/sec     FGA  " 19/30 6.58 sec  22/30  Increased fall risk       mCTSIB  - FTEO (firm)  - FTEC (firm)  - FTEO (foam)  - FTEC (foam)   30 sec  30 sec  30 sec  4.75 sec     NT           -30 sec EC foam      6MWT defer NT   600 feet  PO2 down to 94%  Pt needed sit rest due to LBP     Bales   46/56  53/56         PO2  96  HR 75 bpm                 Precautions:   Past Medical History:   Diagnosis Date    Arthritis     osteo    Autoimmune hepatitis (HCC)     Back pain with radiation     to bilat legs    De Santiago esophagus     De Santiago's esophagus     Chronic GERD     Chronic uveitis     bilateral    CPAP (continuous positive airway pressure) dependence     Diabetes mellitus (HCC)     diet controlled    Disease of thyroid gland     hypo    Diverticulitis     Fibromyalgia syndrome     Fibromyalgia, primary     Hiatal hernia     Hx of colonoscopy 2016    EGD done simultaneously    Hyperlipidemia     Hypertension     Infectious viral hepatitis     PONV (postoperative nausea and vomiting)     Sinus congestion     Sleep apnea     CPAP rx, does not use mask

## 2024-02-29 ENCOUNTER — OFFICE VISIT (OUTPATIENT)
Facility: CLINIC | Age: 84
End: 2024-02-29
Payer: MEDICARE

## 2024-02-29 DIAGNOSIS — R26.89 OTHER ABNORMALITIES OF GAIT AND MOBILITY: ICD-10-CM

## 2024-02-29 DIAGNOSIS — R26.89 BALANCE DISORDER: Primary | ICD-10-CM

## 2024-02-29 PROCEDURE — 97112 NEUROMUSCULAR REEDUCATION: CPT

## 2024-02-29 NOTE — PROGRESS NOTES
"Daily Note     Today's date: 2024  Patient name: Rosa Kapoor  : 1940  MRN: [id number]  Referring provider: Emma Napier DO  Dx:   Encounter Diagnosis     ICD-10-CM    1. Balance disorder  R26.89       2. Other abnormalities of gait and mobility  R26.89                             Subjective: Patient present to PT with no new complaints, changes or falls.    Objective: Patient reports to PT session with no new issues or complaints.   Precautions: 2019 imaging: grade 1 anterolisthesis L4 on L5, and L5 on S1.  Severe central canal narrowing at L4-5 and L5- S1. 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  Disc bulges and facet arthropathy, foraminal narrowing. L3-4 disk osteophyte complex. See report.     NMR:  - STS to fatigue: 13x, 11x  - Alternating step taps (6\") holding small TT: 20x, 2 sets  - Step-ups 6\" to fatigue: 20x, 20x, 0UE  - Side stepping on foam beam: 10ft, 6 laps  - Bwd step-ups to small river rocks to fatigue: 20x  - PT directed colored cone taps while on foam pad: 10x    Assessment: Patient tolerated treatment well today with focus on balance training and functional mobility. Patient with improved quadriceps force generation during step-up activity as she was able to complete increased repetitions with no upper extremity support. Introduced backwards step-ups onto river rocks to further challenge patient's dynamic balance with good demonstration of hip-ankle strategies. Patient will continue to benefit from skilled OPPT services to reduce overall risk for falls and maximize function.    Plan: Continue per plan of care.      HEP.    -Vor x 1  -added chair poses 23    Outcome Measures Initial Eval  23 PN  23 Re-eval  23     5xSTS 20.56 sec 14.5 s 12.70 sec 15.42 sec      TUG  - Regular  - Cognitive   23.38 sec  50.54 sec   -12.31  -18    46% dual task cost   13.94 sec  19.10 sec    37% dual task cost 12.08 sec  -15.23 with many math errors " ( by 3's  26% cost      10 meter defer 12.06 sec 14.86 sec  0.67 m/s 11.77 sec   0.84 m/sec     FGA 11/30 20/30 19/30 6.58 sec  22/30  Increased fall risk       mCTSIB  - FTEO (firm)  - FTEC (firm)  - FTEO (foam)  - FTEC (foam)   30 sec  30 sec  30 sec  4.75 sec     NT           -30 sec EC foam      6MWT defer NT   600 feet  PO2 down to 94%  Pt needed sit rest due to LBP     Bales   46/56  53/56         PO2  96  HR 75 bpm                 Precautions:   Past Medical History:   Diagnosis Date    Arthritis     osteo    Autoimmune hepatitis (HCC)     Back pain with radiation     to bilat legs    De Santiago esophagus     De Santiago's esophagus     Chronic GERD     Chronic uveitis     bilateral    CPAP (continuous positive airway pressure) dependence     Diabetes mellitus (HCC)     diet controlled    Disease of thyroid gland     hypo    Diverticulitis     Fibromyalgia syndrome     Fibromyalgia, primary     Hiatal hernia     Hx of colonoscopy 2016    EGD done simultaneously    Hyperlipidemia     Hypertension     Infectious viral hepatitis     PONV (postoperative nausea and vomiting)     Sinus congestion     Sleep apnea     CPAP rx, does not use mask

## 2024-03-05 ENCOUNTER — OFFICE VISIT (OUTPATIENT)
Facility: CLINIC | Age: 84
End: 2024-03-05
Payer: MEDICARE

## 2024-03-05 DIAGNOSIS — R26.89 BALANCE DISORDER: Primary | ICD-10-CM

## 2024-03-05 DIAGNOSIS — R26.89 OTHER ABNORMALITIES OF GAIT AND MOBILITY: ICD-10-CM

## 2024-03-05 PROCEDURE — 97112 NEUROMUSCULAR REEDUCATION: CPT

## 2024-03-05 NOTE — PROGRESS NOTES
Daily Note     Today's date: 3/5/2024  Patient name: Rosa Kapoor  : 1940  MRN: [id number]  Referring provider: Emma Napier DO  Dx:   Encounter Diagnosis     ICD-10-CM    1. Balance disorder  R26.89       2. Other abnormalities of gait and mobility  R26.89                               Subjective: Patient present to PT with complaints of fatigue; she states she had some muscle soreness in her shoulders and upper extremities following last session.     Objective: Patient reports to PT session with no new issues or complaints.   Precautions: 2019 imaging: grade 1 anterolisthesis L4 on L5, and L5 on S1.  Severe central canal narrowing at L4-5 and L5- S1. 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  Disc bulges and facet arthropathy, foraminal narrowing. L3-4 disk osteophyte complex. See report.     NMR:  - STS to fatigue: 13x, 12x  - Step-ups to medium riverrocks w/ HT/HN: 15x each  - Bwd ambulation w/ 5.5# TT: 20ft, 3 laps  - Side stepping on foam beam to fatigue: 10ft, 10 laps  - Step-taps to large riverrock: 20x, 2 stes, 0 UE    Assessment: Patient tolerated treatment well today with focus on balance training and functional mobility. Re-introduced habituation based activities today, patient demonstrated great increase in postural sway when performing balance activities with head movements. She was most challenged during retro ambulation with one instance of posterior loss of balance which she was able to recover from independently. Patient will continue to benefit from skilled OPPT services to reduce overall risk for falls and maximize function.    Plan: Continue per plan of care.      HEP.    -Vor x 1  -added chair poses 23    Outcome Measures Initial Eval  23 PN  23 Re-eval  23     5xSTS 20.56 sec 14.5 s 12.70 sec 15.42 sec      TUG  - Regular  - Cognitive   23.38 sec  50.54 sec   -12.31  -18    46% dual task cost   13.94 sec  19.10 sec    37% dual task  cost 12.08 sec  -15.23 with many math errors ( by 3's  26% cost      10 meter defer 12.06 sec 14.86 sec  0.67 m/s 11.77 sec   0.84 m/sec     FGA 11/30 20/30 19/30 6.58 sec  22/30  Increased fall risk       mCTSIB  - FTEO (firm)  - FTEC (firm)  - FTEO (foam)  - FTEC (foam)   30 sec  30 sec  30 sec  4.75 sec     NT           -30 sec EC foam      6MWT defer NT   600 feet  PO2 down to 94%  Pt needed sit rest due to LBP     Bales   46/56  53/56         PO2  96  HR 75 bpm                 Precautions:   Past Medical History:   Diagnosis Date    Arthritis     osteo    Autoimmune hepatitis (HCC)     Back pain with radiation     to bilat legs    De Santiago esophagus     De Santiago's esophagus     Chronic GERD     Chronic uveitis     bilateral    CPAP (continuous positive airway pressure) dependence     Diabetes mellitus (HCC)     diet controlled    Disease of thyroid gland     hypo    Diverticulitis     Fibromyalgia syndrome     Fibromyalgia, primary     Hiatal hernia     Hx of colonoscopy 2016    EGD done simultaneously    Hyperlipidemia     Hypertension     Infectious viral hepatitis     PONV (postoperative nausea and vomiting)     Sinus congestion     Sleep apnea     CPAP rx, does not use mask

## 2024-03-07 ENCOUNTER — OFFICE VISIT (OUTPATIENT)
Facility: CLINIC | Age: 84
End: 2024-03-07
Payer: MEDICARE

## 2024-03-07 DIAGNOSIS — R26.89 BALANCE DISORDER: Primary | ICD-10-CM

## 2024-03-07 DIAGNOSIS — R26.89 OTHER ABNORMALITIES OF GAIT AND MOBILITY: ICD-10-CM

## 2024-03-07 DIAGNOSIS — G89.29 CHRONIC SHOULDER PAIN, UNSPECIFIED LATERALITY: ICD-10-CM

## 2024-03-07 DIAGNOSIS — M25.519 CHRONIC SHOULDER PAIN, UNSPECIFIED LATERALITY: ICD-10-CM

## 2024-03-07 PROCEDURE — 97112 NEUROMUSCULAR REEDUCATION: CPT | Performed by: PHYSICAL THERAPIST

## 2024-03-07 RX ORDER — GABAPENTIN 100 MG/1
CAPSULE ORAL
Qty: 90 CAPSULE | Refills: 1 | Status: SHIPPED | OUTPATIENT
Start: 2024-03-07

## 2024-03-07 NOTE — PROGRESS NOTES
"Daily Note     Today's date: 3/7/2024  Patient name: Rosa Kapoor  : 1940  MRN: [id number]  Referring provider: Emma Napier DO  Dx:   Encounter Diagnosis     ICD-10-CM    1. Balance disorder  R26.89       2. Other abnormalities of gait and mobility  R26.89                                 Subjective: Patient present to PT with complaints of fatigue; she states she had some muscle soreness in her shoulders and upper extremities following last session.     Objective: Patient reports to PT session with no new issues or complaints.   Precautions: 2019 imaging: grade 1 anterolisthesis L4 on L5, and L5 on S1.  Severe central canal narrowing at L4-5 and L5- S1. 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  Disc bulges and facet arthropathy, foraminal narrowing. L3-4 disk osteophyte complex. See report.     NMR:  - STS to fatigue: 14x, 15x   - Step-ups to medium riverrocks w/ HT/HN: 15x each  - Bwd ambulation holding red PBall 2 laps, while stacking cones 4 laps   - Side stepping on foam beam to fatigue: 10ft, 10 laps  - Step-taps to large river[place]: 20x, 0 UE  - Step-ups 6\" to fatigue: 20x, 0UE    Assessment: Patient tolerated treatment well today with focus on balance training and functional mobility. She reports her shoulders hurt after carrying 5.5# tidal tank, so trialed large Pball instead but she continued to report shoulder pain from carrying it; modified to cone stacking as a dual task and she did well with that. Patient will continue to benefit from skilled OPPT services to reduce overall risk for falls and maximize function.    Plan: Continue per plan of care.      HEP.    -Vor x 1  -added chair poses 23    Outcome Measures Initial Eval  23 PN  23 Re-eval  23     5xSTS 20.56 sec 14.5 s 12.70 sec 15.42 sec      TUG  - Regular  - Cognitive   23.38 sec  50.54 sec   -12.31  -18    46% dual task cost   13.94 sec  19.10 sec    37% dual task cost 12.08 " sec  -15.23 with many math errors ( by 3's  26% cost      10 meter defer 12.06 sec 14.86 sec  0.67 m/s 11.77 sec   0.84 m/sec     FGA 11/30 20/30 19/30 6.58 sec  22/30  Increased fall risk       mCTSIB  - FTEO (firm)  - FTEC (firm)  - FTEO (foam)  - FTEC (foam)   30 sec  30 sec  30 sec  4.75 sec     NT           -30 sec EC foam      6MWT defer NT   600 feet  PO2 down to 94%  Pt needed sit rest due to LBP     Bales   46/56  53/56         PO2  96  HR 75 bpm                 Precautions:   Past Medical History:   Diagnosis Date    Arthritis     osteo    Autoimmune hepatitis (HCC)     Back pain with radiation     to bilat legs    De Santiago esophagus     De Santiago's esophagus     Chronic GERD     Chronic uveitis     bilateral    CPAP (continuous positive airway pressure) dependence     Diabetes mellitus (HCC)     diet controlled    Disease of thyroid gland     hypo    Diverticulitis     Fibromyalgia syndrome     Fibromyalgia, primary     Hiatal hernia     Hx of colonoscopy 2016    EGD done simultaneously    Hyperlipidemia     Hypertension     Infectious viral hepatitis     PONV (postoperative nausea and vomiting)     Sinus congestion     Sleep apnea     CPAP rx, does not use mask

## 2024-03-08 ENCOUNTER — APPOINTMENT (OUTPATIENT)
Dept: LAB | Facility: CLINIC | Age: 84
End: 2024-03-08
Payer: MEDICARE

## 2024-03-08 DIAGNOSIS — I10 ESSENTIAL HYPERTENSION: ICD-10-CM

## 2024-03-08 DIAGNOSIS — E03.9 ACQUIRED HYPOTHYROIDISM: ICD-10-CM

## 2024-03-08 LAB
ALBUMIN SERPL BCP-MCNC: 4.2 G/DL (ref 3.5–5)
ALP SERPL-CCNC: 56 U/L (ref 34–104)
ALT SERPL W P-5'-P-CCNC: 20 U/L (ref 7–52)
ANION GAP SERPL CALCULATED.3IONS-SCNC: 9 MMOL/L
AST SERPL W P-5'-P-CCNC: 23 U/L (ref 13–39)
BILIRUB SERPL-MCNC: 0.51 MG/DL (ref 0.2–1)
BUN SERPL-MCNC: 20 MG/DL (ref 5–25)
CALCIUM SERPL-MCNC: 9.1 MG/DL (ref 8.4–10.2)
CHLORIDE SERPL-SCNC: 103 MMOL/L (ref 96–108)
CHOLEST SERPL-MCNC: 162 MG/DL
CO2 SERPL-SCNC: 27 MMOL/L (ref 21–32)
CREAT SERPL-MCNC: 0.56 MG/DL (ref 0.6–1.3)
ERYTHROCYTE [DISTWIDTH] IN BLOOD BY AUTOMATED COUNT: 13.9 % (ref 11.6–15.1)
GFR SERPL CREATININE-BSD FRML MDRD: 85 ML/MIN/1.73SQ M
GLUCOSE P FAST SERPL-MCNC: 91 MG/DL (ref 65–99)
HCT VFR BLD AUTO: 39.9 % (ref 34.8–46.1)
HDLC SERPL-MCNC: 57 MG/DL
HGB BLD-MCNC: 13.1 G/DL (ref 11.5–15.4)
LDLC SERPL CALC-MCNC: 91 MG/DL (ref 0–100)
MCH RBC QN AUTO: 27.6 PG (ref 26.8–34.3)
MCHC RBC AUTO-ENTMCNC: 32.8 G/DL (ref 31.4–37.4)
MCV RBC AUTO: 84 FL (ref 82–98)
PLATELET # BLD AUTO: 195 THOUSANDS/UL (ref 149–390)
PMV BLD AUTO: 11.1 FL (ref 8.9–12.7)
POTASSIUM SERPL-SCNC: 4.4 MMOL/L (ref 3.5–5.3)
PROT SERPL-MCNC: 7 G/DL (ref 6.4–8.4)
RBC # BLD AUTO: 4.74 MILLION/UL (ref 3.81–5.12)
SODIUM SERPL-SCNC: 139 MMOL/L (ref 135–147)
T4 FREE SERPL-MCNC: 1.06 NG/DL (ref 0.61–1.12)
TRIGL SERPL-MCNC: 69 MG/DL
TSH SERPL DL<=0.05 MIU/L-ACNC: 4.14 UIU/ML (ref 0.45–4.5)
WBC # BLD AUTO: 5.55 THOUSAND/UL (ref 4.31–10.16)

## 2024-03-08 PROCEDURE — 80053 COMPREHEN METABOLIC PANEL: CPT

## 2024-03-08 PROCEDURE — 36415 COLL VENOUS BLD VENIPUNCTURE: CPT

## 2024-03-08 PROCEDURE — 84443 ASSAY THYROID STIM HORMONE: CPT

## 2024-03-08 PROCEDURE — 80061 LIPID PANEL: CPT

## 2024-03-08 PROCEDURE — 85027 COMPLETE CBC AUTOMATED: CPT

## 2024-03-08 PROCEDURE — 84439 ASSAY OF FREE THYROXINE: CPT

## 2024-03-11 ENCOUNTER — EVALUATION (OUTPATIENT)
Facility: CLINIC | Age: 84
End: 2024-03-11
Payer: MEDICARE

## 2024-03-11 DIAGNOSIS — R26.89 OTHER ABNORMALITIES OF GAIT AND MOBILITY: ICD-10-CM

## 2024-03-11 DIAGNOSIS — R26.89 BALANCE DISORDER: Primary | ICD-10-CM

## 2024-03-11 PROCEDURE — 97530 THERAPEUTIC ACTIVITIES: CPT

## 2024-03-11 NOTE — PROGRESS NOTES
PT Re-Evaluation /PROGRESS NOTE         POC expires Auth Status Total   Visits  Start date  Expiration date PT/OT + Visit Limit? Co-Insurance   24 N/A N/A N/A N/A PT, MAJO No and $0 Co-pay                                                  Today's date: 3/11/2024  Patient name: Rosa Kapoor  : 1940  MRN: [id number]  Referring provider: Emma Napier DO  Dx:   Encounter Diagnosis     ICD-10-CM    1. Balance disorder  R26.89       2. Other abnormalities of gait and mobility  R26.89                 Assessment    Updated 3/11/2024: Patient reports for reassessment with deficits in strength, balance, and mobility. At this time, patient feels her greatest limitations include LBP and decreased community mobility as a result. Patient demonstrates overall improvements in the following outcome measures since last reassessment: 5 x STS and TUG (regular and cognitive), likely indicating overall gains in functional LE strength, safe functional mobility, and dual tasking ability. Patient has otherwise demonstrated general plateau in ambulation speed and 6 MWT. Patient limited in 6 MWT in large part due to onset of LBP with prolonged ambulation. Per cutoff scores for the FGA she continues to be classified as borderline risk for falls. However, based on patient's performance for TUG cognitive, she demonstrates a 10% difference which puts her at low risk for falls and demonstrates improvement in dual tasking ability compared with previous assessments. Due to patient's persistent LBP in addition to primary goal being to improve ability to ambulate community distances, discussed with patient the benefit or ortho PT reassessment. Plan is for patient to transition to ortho PT services in next 1-2 weeks in order to address deficits most relevant to her functional limitations, with patient in agreement at this time. She has met one additional short term goal at  this time.      Updated Progress Note 2/6/24:  Restart of PT after extended break due to unknown viral illness requiring several weeks to recover.  Patient returns after absence since 12/14/23.   Objective measures indicated that her gait speed over 10 M does not meet age norm, but qualifies her as a community ambulator.  During 6 MWT she had to rest during the test due to LBP.  Her score of 600 feet is well below age norm of 1286 feet.  Bales Score of 53/56 indicates no increased risk for falls.  She displays only slowing during gait with head turns, not veering. FGA score of 22/30 indicates increased fall risk.  5XSTS score has declined since pt was last here, to 15.42 sec, and is slower than age norm.  TUG regular is within safety range, but there was a 26% cost for TUG cog, with multiple math errors counting backwards by 3's. Patient would benefit from continued PT with focus on posterior pelvic tilting during walking/standing/function to prevent onset of LBP that is likely due to stenosis. Focus also on mental dual tasking for improved safety in home and community.  Advise continued PT per POC.  Patient is in agreement with plan.        UPDATED 12/7/23: Rosa had VNG this afternoon and had no response to calorics which could indicate bilateral hypofunction. Otherwise no abnormal findings from VNG. She demonstrates improvements in 5x STS and is now deemed low fall risk. She remains high fall risk per TUG and TUG cog, however dual task cost is decreasing. She also remains high fall risk per FGA and gait speed. Gait speed indicates limited community ambulator. She continues to veer with head turns during gait. She tested + for orthostatic hypotension last visit and we reached out to PCP regarding it. Her chief complaint at this time is imbalance more than the dizziness. Discussed plan moving forward to focus on dynamic balance and gait for the next month, and then re-assess for improvements. Pt in agreement with  "this plan.     UPDATED 11/2/23: Rosa has attended PT for the past month for the diagnoses above. She reports feeling more agile since starting PT. She continues to report dizziness and has tested (+) for orthostatic hypotension, and displays significant motion sensitivity.    Objective measures:  5XSTS improved from 20 to 14.5 seconds improved by more than the MDC 2.3 sec.  (14.8 sec for age/gender norm.)  TUG improved from 23 to 12 seconds, more than 4.4 MDC and better than 13.5 safety cutoff.   TUG cog improved from 50 to 18 seconds.  FGA improved from 11 to 20/30. She remains high fall risk. She continues to demonstrate significant veering during gait with horizontal head turns. She must slow prior to stepping over a 6\" obstacles.   Bales score 46/56 indicates she remains high fall risk.      Goal completion:  Patient has met several  STG goals  and 1 LTG with progress towards additional goals, at this time.     Recommendations:    Advise continued PT per POC, to continue vestibular therapy with habituation, and dynamic gait/balance training to improve mobility to safe level in the community.   Rosa is in aggreement with this plan.   Plan for 6MWT next visit.     Initial PT Assessment details: Patient is a 83 y.o. Female who presents to skilled outpatient PT with imbalance, dizziness and difficulty with stair negotiation affecting her functional indpendence and ability to participate in her community . Patient's past medical history significant for falls, controlled HTN, and substantial surgical history. She demonstrates deficits in muscular strength, ambulatory safety, dual tasking, dynamic and static balance as per scores on 5x STS, TUG and TUG cog, FGA, and mCTSIB respectively. As per APTA and Rehab Measure cuttoff scores, Rosa categorizes at a HIGH risk for falls with respect to the aforementioned outcome measures. Importantly, patient with occurrence of subjective dizziness symptoms following position changes " (supine to stand,) ambulation with head turns, backwards walking and activities where vision was occluded. This could indicate a vestibular system dysfunction, plan to perform oculomotor and cervical screen to assess possible vestibular system involvement, as well as screen for orthostatic hypotension. Patient demonstrates high reliance on visual system for dynamic and static balance activities further perpetuating her risk for falls. Unable to assess gait speed and cardiovascular endurance today due to time constraints, plan to assess in future session to appropriate educate on use of assisted device while ambulating in her community. She will benefit from skilled PT to target balance deficits to improve patient overall function, decrease risk for fall to promote independence and participation in her community.     Impairments: Abnormal coordination, Abnormal gait, Abnormal muscle tone, Abnormal or restricted ROM, Activity intolerance, Impaired balance, Impaired physical strength, Lacks appropriate HEP, Poor posture, Poor body mechanics, Pain with function, Safety issue, Weight-bearing intolerance, Abnormal movement, Difficulty understanding, Abnormal muscle firing  Understanding of Dx/Px/POC: Good  Prognosis: Good    Patient verbalized understanding of POC.         Please contact me if you have any questions or recommendations. Thank you for the referral and the opportunity to share in Rosa Kapoor's care.        Plan  Plan details: balance training, strengthening, vestibular training  Patient would benefit from: Skilled PT  Planned modality interventions: Biofeedback, Cryotherapy, TENS, Thermotherapy  Planned therapy interventions: Abdominal trunk stabilization, ADL training, Balance, Balance/WB training, Breathing training, Body mechanics training, Coordination, Functional ROM exercises, Gait training, HEP, Joint Mobilization, Manual Therapy, Felix taping, Motor coordination training, Neuromuscular  re-education, Patient education, Postural training, Strengthening, Stretching, Therapeutic activities, Therapeutic exercises, Therapeutic training, Transfer training, Activity modification, Work reintegration  Frequency: 2x/wk  Duration in weeks: 12  Plan of Care beginning date: 2023  Plan of Care expiration date: 12 weeks - 2024  Treatment plan discussed with: Patient and Family       Goals  Short Term Goals (4 weeks):    - Patient will improve time on TUG by 2.9 seconds to facilitate improved safety in all ambulation MET   - Patient will be independent in basic HEP 2-3 weeks -MET  - Patient will improve 5xSTS score by 2.3 seconds to promote improved LE functional strength needed for ADLs -MET   - Patient will reduce dual task cost to < 25% to improve balance and ability to dual task. - MET    Long Term Goals (12 weeks):  - Patient will be independent in a comprehensive home exercise program-NOT MET  - Patient will improve gait speed to 1.0 m/s or greater to improve safety with community ambulation- NOT MET, IMPROVING  - Patient will improve scoring on FGA by 4 points to progress safety with dynamic tasks- MET   - Patient will be able to demonstrate HT in gait without veering-MET   - Patient will report going on walks at least 3 days per week to promote independence and improved cardiovascular endurance- NOT MET  - Patient will report 50% reduction in near falls when ambulating on uneven terrain- NOT MET  - Patient will reduce dual task cost to < 10% to improve balance and ability to dual task. - NOT MET  - Patient will increase FGA score to 25/30 to reduce fall risk. NOT MET      Cut off score    All date taken from APTA Neuro Section or Rehab Measures      Bales/56  MDC: 6 pts  Age Norms:  60-69: M - 55   F - 55  70-79: M - 54   F - 53  80-89: M - 53   F - 50 5xSTS: Mallory et al 2010  MDC: 2.3 sec  Age Norms:  60-69: 11.1 sec  70-79: 12.6 sec  80-89: 14.8 sec   TUG  MDC: 4.14 sec  Cut off score:  >13.5  "sec community dwelling adults  >32.2 frail elderly  <20 I for basic transfers  >30 dependent on transfers 10 Meter Walk Test: Genaro et al 2011  MDC: 0.18 m/s  20-29: M - 1.35 m   F - 1.34 m  30-39: M - 1.43 m   F - 1.34 m  40-49: M - 1.43 m   F - 1.39 m  50-59: M - 1.43 m   F - 1.31 m  60-69: M - 1.34 m   F - 1.24 m  70-79: M - 1.26 m   F - 1.13 m  80-89: M - 0.97 m   F - 0.94 m    Household Ambulator < 0.4 m/s  Limited Community Ambulator 0.4 - 0.8 m/s  Community Ambulator 0.8 - 1.2 m/s  Safely cross the street > 1.2 m/s   FGA  MCID: 4 pts  Geriatrics/community < 22/30 fall risk  Geriatrics/community < 20/30 unexplained falls    DGI  MDC: vestibular - 4 pts  MDC: geriatric/community - 3 pts  Falls risk <19/24 mCTSIB  Norm: 20-60 yrs  Eyes open firm: norm sway 0.21-0.48  Eyes closed firm: norm sway 0.48-0.99  Eyes open foam: norm sway 0.38-0.71  Eyes closed foam: norm sway 0.70-2.22   6 Minute Walk Test  MDC: 190.98 ft  MCID: 164 ft    Age Norms  60-69: M - 1876 ft (571.80 m)  F - 1765 ft (537.98 m)  70-79: M - 1729 ft (527.00 m)  F - 1545 ft (470.92 m)  80-89: M - 1368 ft (416.97 m)  F - 1286 ft (391.97 m) ABC: Zaida & Horowitz, 2003  <67% increased risk for falls         Subjective    Updated (3/11/2023): Patient reports that she wants to continue on her community mobility and longer distance ambulating. She reports she continues to feel limited by LBP with increased mobility. She reports that her dizziness has been \"pretty good\" and feels her blood pressure has stabilized, which has helped.    Update 12/7/23: Pt reports she feels like her walking has actually gotten better. She had VNG today and she had no response to calorics which could indicate bilateral hypofunction (no central signs and negative for BPPV). She would like to be able to walk more, but sometimes her back hurts. Wants to be able to walk without \"solange-tottering\" outside in open environments.     Updated 11/2/23: Patient reports " "feeling more agile since starting PT one month ago.      Initial assessment:History of Present Illness  - Mechanism of injury: Patient is an 83 year old female presenting to PT with complaints of difficulty climbing stairs, imbalance and history of falls. Past medical history is significant for controlled HTN, L hip pain, and substantial surgical history. She reports she's only had one recent fall (2023) which occurred because she missed a step, lost her balance anteriorly and landed on her R hand and face. She is currently attending hand therapy for subsequent fracture in R hand. Rosa reports feelings of dizziness described as \"lightheaded and floaty\" that accompanies imbalance especially when waking up in the middle of the night and walking in the dark. She reports dizziness does not last longer than a couple of seconds and denies any falls proceeded by described dizzines. Patient does not currently ambulate with an AD but her daughter has purchased her a SPC due to her unsteadiness.     - Primary AD: daughter bought her a cane recently, no ad at home or in community.   - Assist level at home: independent  - Decreased fine motor tasks: No      Pain  - Current pain ratin/10  - At best pain ratin/10  - At worst pain ratin-7/10  - Location: L buttock, hip  - Aggravating factors: standing, morning    Social Support  - Steps to enter house: none, 1 garage steps  - Stairs in house: no steps, ranch style house  - Lives in: ranch style house  - Lives with:  and daughter visits occasionally    - Employment status: retired,  for [employer]  - Hand dominance: R handed    Treatments  - Previous treatment: Hand therapy, ortho PT for shoulder   - Current treatment: Hand therapy  - Diagnostic Testing: x-ray from fall of hand.       Objective     LE MMT  - R Hip Flexion: 3+/5  L Hip Flexion: 4-/5  - R Hip Extension: 4/5  L Hip Extension: 4/5  - R Hip Abduction: 4/5  L Hip " Abduction: 4/5  - R Hip Adduction: 4-/5  L Hip Adduction: 4+/5  - R Knee Extension: 4/5 (pain in R quad)  L Knee Extension: 5/5  - R Knee Flexion: 5/5  L Knee Flexion: 5/5  - R Ankle DF: 4-/5   L Ankle DF: 4/5  - R Ankle PF: 5/5   L Ankle PF: 5/5    Sensation  - Light touch: normla, B/L numbness at lateral aspects of shins    Coordination  - Heel to Shin: Normal, weakness evident  - Alternate Toe Taps: normal  - Finger to Nose: normal    Myelopathy Screen (>3/5 +)  - Lowry's Reflex: -  - Inverted Supinator Sign: -  - Age > 45: Yes  - Gait Deviation: No      Gait  - Abnormalities: decreased gait speed, decreased trunk rotation, decreased B/L foot clearance         Outcome Measures Initial Eval  9/27/23 PN  11/2/23 Re-eval  12/7/23 PN  2/6/24 PN  3/11/2024    5xSTS 20.56 sec 14.5 s 12.70 sec 15.42 sec  11.71 sec    TUG  - Regular  - Cognitive   23.38 sec  50.54 sec   -12.31  -18    46% dual task cost   13.94 sec  19.10 sec    37% dual task cost   12.08 sec  -15.23 with many math errors ( by 3's  26% cost    10.37 sec  11.44 sec     10% dual task cost    10 meter defer 12.06 sec 14.86 sec  0.67 m/s 11.77 sec   0.84 m/sec 0.73 m/s    FGA 11/30 20/30 19/30 6.58 sec  22/30  Increased fall risk   22/30    mCTSIB  - FTEO (firm)  - FTEC (firm)  - FTEO (foam)  - FTEC (foam)   30 sec  30 sec  30 sec  4.75 sec     NT           -30 sec EC foam    NT    6MWT defer NT   600 feet  PO2 down to 94%  Pt needed sit rest due to  ft (limited by LBP)    Bales   46/56  53/56 NA        PO2  96  HR 75 bpm        TE:  - Repeated extension in standing x 10 reps > pain increased  - Repeated flexion in sitting x 10 reps > slight improvement in pain    Precautions:   Past Medical History:   Diagnosis Date    Arthritis     osteo    Autoimmune hepatitis (HCC)     Back pain with radiation     to bilat legs    De Santiago esophagus     De Santiago's esophagus     Chronic GERD     Chronic uveitis     bilateral    CPAP (continuous positive airway  pressure) dependence     Diabetes mellitus (HCC)     diet controlled    Disease of thyroid gland     hypo    Diverticulitis     Femur fracture (HCC)     Fibromyalgia syndrome     Fibromyalgia, primary     Fractures     Hiatal hernia     Hx of colonoscopy 2016    EGD done simultaneously    Hyperlipidemia     Hypertension     Infectious viral hepatitis     Osteoarthritis     PONV (postoperative nausea and vomiting)     Sinus congestion     Sleep apnea     CPAP rx, does not use mask

## 2024-03-13 ENCOUNTER — OFFICE VISIT (OUTPATIENT)
Facility: CLINIC | Age: 84
End: 2024-03-13
Payer: MEDICARE

## 2024-03-13 DIAGNOSIS — R26.89 BALANCE DISORDER: Primary | ICD-10-CM

## 2024-03-13 DIAGNOSIS — R26.89 OTHER ABNORMALITIES OF GAIT AND MOBILITY: ICD-10-CM

## 2024-03-13 PROCEDURE — 97112 NEUROMUSCULAR REEDUCATION: CPT

## 2024-03-19 ENCOUNTER — OFFICE VISIT (OUTPATIENT)
Facility: CLINIC | Age: 84
End: 2024-03-19
Payer: MEDICARE

## 2024-03-19 DIAGNOSIS — R26.89 OTHER ABNORMALITIES OF GAIT AND MOBILITY: ICD-10-CM

## 2024-03-19 DIAGNOSIS — R26.89 BALANCE DISORDER: Primary | ICD-10-CM

## 2024-03-19 PROCEDURE — 97112 NEUROMUSCULAR REEDUCATION: CPT

## 2024-03-19 NOTE — PROGRESS NOTES
Daily Note     Today's date: 3/19/2024  Patient name: Rosa Kapoor  : 1940  MRN: [id number]  Referring provider: Emma Napier DO  Dx:   Encounter Diagnosis     ICD-10-CM    1. Balance disorder  R26.89       2. Other abnormalities of gait and mobility  R26.89             Subjective: Patient present to PT with no new complaints, falls.     Objective: Patient reports to PT session with no new issues or complaints.   Precautions: 2019 imaging: grade 1 anterolisthesis L4 on L5, and L5 on S1.  Severe central canal narrowing at L4-5 and L5- S1. 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  Disc bulges and facet arthropathy, foraminal narrowing. L3-4 disk osteophyte complex. See report.     NMR:  - STS to fatigue: 15x, 15x 0UE  - Step-taps to large riverrock while listing different zoo animals: 20x, 0 UE  - Step-ups to medium riverrocks w/ chaining activities: 25x   - Side stepping on foam beams with HT/HN: 7 laps  - Bwd ambulation holding red Pball: 15ft, 3 laps    Assessment: Patient tolerated treatment well today with focus on balance training and functional mobility. Included more dual tasking activities today to increase exercise intensity and promote improvements in dynamic balance. She was initially challenged by step taps to large river rocks while performing chaining activity however, this improved as repetitions increased suggesting good motor learning. Held weights this session secondary to patient request due to shoulder pain. Patient will continue to benefit from skilled OPPT services to reduce overall risk for falls and maximize function.    Plan: Continue per plan of care.      HEP.    -Vor x 1  -added chair poses 23    Outcome Measures Initial Eval  23 PN  23 Re-eval  23     5xSTS 20.56 sec 14.5 s 12.70 sec 15.42 sec      TUG  - Regular  - Cognitive   23.38 sec  50.54 sec   -12.31  -18    46% dual task cost   13.94 sec  19.10 sec    37% dual task cost  12.08 sec  -15.23 with many math errors ( by 3's  26% cost      10 meter defer 12.06 sec 14.86 sec  0.67 m/s 11.77 sec   0.84 m/sec     FGA 11/30 20/30 19/30 6.58 sec  22/30  Increased fall risk       mCTSIB  - FTEO (firm)  - FTEC (firm)  - FTEO (foam)  - FTEC (foam)   30 sec  30 sec  30 sec  4.75 sec     NT           -30 sec EC foam      6MWT defer NT   600 feet  PO2 down to 94%  Pt needed sit rest due to LBP     Bales   46/56  53/56         PO2  96  HR 75 bpm                 Precautions:   Past Medical History:   Diagnosis Date    Arthritis     osteo    Autoimmune hepatitis (HCC)     Back pain with radiation     to bilat legs    De Santiago esophagus     De Santiago's esophagus     Chronic GERD     Chronic uveitis     bilateral    CPAP (continuous positive airway pressure) dependence     Diabetes mellitus (HCC)     diet controlled    Disease of thyroid gland     hypo    Diverticulitis     Fibromyalgia syndrome     Fibromyalgia, primary     Hiatal hernia     Hx of colonoscopy 2016    EGD done simultaneously    Hyperlipidemia     Hypertension     Infectious viral hepatitis     PONV (postoperative nausea and vomiting)     Sinus congestion     Sleep apnea     CPAP rx, does not use mask

## 2024-03-21 ENCOUNTER — EVALUATION (OUTPATIENT)
Dept: PHYSICAL THERAPY | Facility: CLINIC | Age: 84
End: 2024-03-21
Payer: MEDICARE

## 2024-03-21 DIAGNOSIS — M54.50 CHRONIC MIDLINE LOW BACK PAIN WITHOUT SCIATICA: Primary | ICD-10-CM

## 2024-03-21 DIAGNOSIS — G89.29 CHRONIC MIDLINE LOW BACK PAIN WITHOUT SCIATICA: Primary | ICD-10-CM

## 2024-03-21 PROCEDURE — 97110 THERAPEUTIC EXERCISES: CPT

## 2024-03-21 PROCEDURE — 97112 NEUROMUSCULAR REEDUCATION: CPT

## 2024-03-21 NOTE — PROGRESS NOTES
"Low Back Pain Re-Eval     Today's date: 3/21/2024  Patient name: Rosa Kapoor  : 1940  MRN: [id number]  Referring provider: Emma Napier DO  Dx:   Encounter Diagnosis     ICD-10-CM    1. Chronic midline low back pain without sciatica  M54.50     G89.29           Start Time: 1415  Stop Time: 1457  Total time in clinic (min): 42 minutes    Subjective: Pt has had a bad back for many years. Notes long hx without specific cause or mechanism of injury. Notes that she has lost 4 inches of height. Pt has had at least a dozen epidurals, most recent in . Was diagnosed with stenosis, \"clumping of nerves,\" couple of cracked vertebrae many years ago. L shoulder also has pain, reaches up into neck. Pain in back is in midline low back, through B buttocks, through hips, gets achy and painful w/ fatigue. No stairs in home, but has trouble when using them in public. Has hx of R hip replacement from fall and fracture in . Hx of 3 knee replacements. Notes some trouble sleeping at night s/t pain. Sees chiro regularly for \"a long time.\" Feels that this is helpful. Main goal is pain relief, wants to get stronger so she can walk to and from clubhouse in her community without having to stop. Functional status below:    Standing and walking tolerance <10 mins    Current pain: 1/10  At best: 0/10   At worst: 8/10 - L side low back in morning    Short term goals (3 weeks):  1) Pt will improve thoracolumbar mobility deficits by 25% pain free.  2) Pt will improve B LE and core strength deficits by 1/3 grade MMT.  3) Pt will improve pain at worse to <4/10.   4) Pt will initiate and progress HEP w/ special emphasis on functional core control and thoracolumbar mobility.    Long term goals (6 weeks)  1) Pt will stand and ambulate for community distances w/o limitation due to low back.   2) Pt will sleep through the night in positioning of choosing 6/7 nights a week w/o waking due to pain.  3) Pt will perform all household activities " for two full weeks w/o modification due to low back pain.  4) Pt will be independent and compliant w/ HEP in order to maximize functional benefit of skilled PT following d/c.        Objective:     Flex: 50% stiff w/ min pain  Ext: 25% w/ min pain   SB R: 33%   SB L: 33% w/ pain in L side   Rot R: 50% w/ min pain in R side low back  Rot L: 50% w/ min pain in R side low back    Hip strength:  Grossly 4-/5 throughout    Core: no greater than 1+/5 w/ heavy cueing required for form and pacing.    Repeated motions not tested today    Ambulates w/ forward flexed posture, decreased step length L>R, min antalgic         Assessment: Tolerated treatment well. Patient demonstrated fatigue post treatment and would benefit from continued PT. Pt has been re-assessed for low back pain, to a lesser extent L shoulder/neck pain. She has deficits in thoracolumbar ROM and B LE/core strength, reports high instances of pain, and has decreased tolerance to functional activity. She is limited in her ability to stand and ambulate w/o stopping due to low back pain, w/ self care and home activities, w/ sleeping through the night, and w/ participating in age appropriate recreation. She is motivated to improve and will benefit from weekly sessions at both neuro and ortho PT w/ focus on maximizing functional mobility within home and community.      Plan: Continue per plan of care. Loaded strength as able     POC expires Auth Status Total   Visits  Start date  Expiration date PT/OT + Visit Limit? Co-Insurance    MEDICARE                                                  Date (Visit #) RE 3/21 (1) (2) (3) (4) (5)   DTM and TPR        Lumbar mobs        Lumbar Traction Trialed                Exercise Diary              Ther Ex        Active w/u             HS/glute/piri/HF stretching  tested x 3-4 mins            Mobility (LTR, open books, cat/cow) Intro        Rows/ext        Hip stability        Cerv retraction 2x10       GTB rows  2x10        Thoracolumbar ROM  Tested x 3-4 mins                       Neuro Re Ed        TrA progressions   isos, march x20 each           Bridge progressions W/ ad sq 2x10            Standing pball press 2x10 into table    W/ hip abd 2x10            Hip abd progressions             Balance                HEP and POC review  X15 min w/ re-assessment                               Ther Act             Stairs             Functional Transfers             Functional Lifting                                                                     Modalities              heat               ice               mechanical                                             3/21 RE:  Access Code: [email or web address]  URL: [email or web address]  Date: 03/21/2024  Prepared by: Sebastian Muniz    Exercises  - Supine Posterior Pelvic Tilt  - 1 x daily - 7 x weekly - 3 sets - 10 reps  - Supine March  - 1 x daily - 7 x weekly - 3 sets - 10 reps  - Supine Bridge with Mini Swiss Ball Between Knees  - 1 x daily - 7 x weekly - 3 sets - 10 reps  - Seated Abdominal Press into Swiss Ball  - 1 x daily - 7 x weekly - 3 sets - 10 reps  - Standing Bilateral Low Shoulder Row with Anchored Resistance  - 1 x daily - 7 x weekly - 3 sets - 10 reps  - Seated Cervical Retraction  - 1 x daily - 7 x weekly - 3 sets - 10 reps

## 2024-03-26 ENCOUNTER — OFFICE VISIT (OUTPATIENT)
Dept: PHYSICAL THERAPY | Facility: CLINIC | Age: 84
End: 2024-03-26
Payer: MEDICARE

## 2024-03-26 DIAGNOSIS — M54.50 CHRONIC MIDLINE LOW BACK PAIN WITHOUT SCIATICA: Primary | ICD-10-CM

## 2024-03-26 DIAGNOSIS — G89.29 CHRONIC MIDLINE LOW BACK PAIN WITHOUT SCIATICA: Primary | ICD-10-CM

## 2024-03-26 PROCEDURE — 97112 NEUROMUSCULAR REEDUCATION: CPT

## 2024-03-26 PROCEDURE — 97110 THERAPEUTIC EXERCISES: CPT

## 2024-03-26 NOTE — PROGRESS NOTES
Daily Note     Today's date: 3/26/2024  Patient name: Rosa Kapoor  : 1940  MRN: [id number]  Referring provider: Emma Napier DO  Dx:   Encounter Diagnosis     ICD-10-CM    1. Chronic midline low back pain without sciatica  M54.50     G89.29                      Subjective: Pt reports some low back fatigue after walking back to clinic from car. Notes that pain is a 3/10 to start. Felt good after eval, some fatigue but no increase in pain.       Objective: requires cueing w/ standing stab work to promote post chain and postural stabilizer activation throughout - tends to utilize trunk momentum to create sh ext - able to correct intermittently.       Assessment: Tolerated treatment well. Patient demonstrated fatigue post treatment and would benefit from continued PT. Does well w/ exercise progressions. Fatigue and some L anterior shoulder soreness to end session. Will increase intensity pending pt response to today's program. Appropriate for loaded progressions as sx allow.       Plan: Continue per plan of care. Stairs, pball progressions, controlled carries     POC expires Auth Status Total   Visits  Start date  Expiration date PT/OT + Visit Limit? Co-Insurance    MEDICARE                                                  Date (Visit #) RE 3/21 (1) 3/26 (2) (3) (4) (5)   DTM and TPR        Lumbar mobs        Lumbar Traction Trialed                Exercise Diary              Ther Ex        Active w/u             HS/glute/piri/HF stretching  tested x 3-4 mins   x5-6 mins CP         Mobility (LTR, open books, cat/cow) Intro  2x15 LTR       Rows/ext  Red 2x10-12 each      Hip stability        Cerv retraction 2x10 X10 supine      GTB rows  2x10       Thoracolumbar ROM  Tested x 3-4 mins Upper quarter stretching w/ cerv distraction x 6 mins                       Neuro Re Ed        TrA progressions   isos, march x20 each  isos w/ ad sq 2x10  TrA single leg ext 2x10         Bridge progressions W/ ad sq 2x10   2x10 ad  sq         Standing pball press 2x10 into table    W/ hip abd 2x10   rev         Hip abd progressions             pallof  Red x 10 to L, x5 to R (pain in L shoulder)       Horiz abd w/ scap sq  Green 2x10 supine  Yellow sitting x20      HEP and POC review  X15 min w/ re-assessment X2-3 mins                               Ther Act             Stairs             Functional Transfers             Functional Lifting                                                                     Modalities              heat               ice               mechanical                                             3/21 RE:  Access Code: [email or web address]  URL: [email or web address]  Date: 03/21/2024  Prepared by: Sebastian Muniz    Exercises  - Supine Posterior Pelvic Tilt  - 1 x daily - 7 x weekly - 3 sets - 10 reps  - Supine March  - 1 x daily - 7 x weekly - 3 sets - 10 reps  - Supine Bridge with Mini Swiss Ball Between Knees  - 1 x daily - 7 x weekly - 3 sets - 10 reps  - Seated Abdominal Press into Swiss Ball  - 1 x daily - 7 x weekly - 3 sets - 10 reps  - Standing Bilateral Low Shoulder Row with Anchored Resistance  - 1 x daily - 7 x weekly - 3 sets - 10 reps  - Seated Cervical Retraction  - 1 x daily - 7 x weekly - 3 sets - 10 reps

## 2024-03-26 NOTE — PROGRESS NOTES
Daily Note     Today's date: 3/26/2024  Patient name: Rosa Kapoor  : 1940  MRN: [id number]  Referring provider: Emma Napier DO  Dx: No diagnosis found.               Subjective: Pt reports       Objective: See treatment diary below      Assessment: Tolerated treatment well. Patient demonstrated fatigue post treatment and would benefit from continued PT      Plan: Continue per plan of care.      POC expires Auth Status Total   Visits  Start date  Expiration date PT/OT + Visit Limit? Co-Insurance    MEDICARE                                                  Date (Visit #) RE 3/21 (1) 3/26 (2) (3) (4) (5)   DTM and TPR        Lumbar mobs        Lumbar Traction Trialed                Exercise Diary              Ther Ex        Active w/u             HS/glute/piri/HF stretching  tested x 3-4 mins            Mobility (LTR, open books, cat/cow) Intro        Rows/ext        Hip stability        Cerv retraction 2x10       GTB rows  2x10       Thoracolumbar ROM  Tested x 3-4 mins                       Neuro Re Ed        TrA progressions   isos, march x20 each           Bridge progressions W/ ad sq 2x10            Standing pball press 2x10 into table    W/ hip abd 2x10            Hip abd progressions             Balance                HEP and POC review  X15 min w/ re-assessment                               Ther Act             Stairs             Functional Transfers             Functional Lifting                                                                     Modalities              heat               ice               mechanical                                             3/21 RE:  Access Code: [email or web address]  URL: [email or web address]  Date: 2024  Prepared by: Sebastian Muniz    Exercises  - Supine Posterior Pelvic Tilt  - 1 x daily - 7 x weekly - 3 sets - 10 reps  - Supine March  - 1 x daily - 7 x weekly - 3 sets - 10 reps  - Supine Bridge with Mini Swiss Ball Between Knees  - 1 x daily - 7 x  weekly - 3 sets - 10 reps  - Seated Abdominal Press into Swiss Ball  - 1 x daily - 7 x weekly - 3 sets - 10 reps  - Standing Bilateral Low Shoulder Row with Anchored Resistance  - 1 x daily - 7 x weekly - 3 sets - 10 reps  - Seated Cervical Retraction  - 1 x daily - 7 x weekly - 3 sets - 10 reps

## 2024-03-28 ENCOUNTER — OFFICE VISIT (OUTPATIENT)
Dept: PHYSICAL THERAPY | Facility: CLINIC | Age: 84
End: 2024-03-28
Payer: MEDICARE

## 2024-03-28 DIAGNOSIS — G89.29 CHRONIC MIDLINE LOW BACK PAIN WITHOUT SCIATICA: Primary | ICD-10-CM

## 2024-03-28 DIAGNOSIS — R26.89 BALANCE DISORDER: ICD-10-CM

## 2024-03-28 DIAGNOSIS — R26.89 OTHER ABNORMALITIES OF GAIT AND MOBILITY: ICD-10-CM

## 2024-03-28 DIAGNOSIS — M54.50 CHRONIC MIDLINE LOW BACK PAIN WITHOUT SCIATICA: Primary | ICD-10-CM

## 2024-03-28 PROCEDURE — 97110 THERAPEUTIC EXERCISES: CPT | Performed by: PHYSICAL THERAPIST

## 2024-03-28 PROCEDURE — 97112 NEUROMUSCULAR REEDUCATION: CPT | Performed by: PHYSICAL THERAPIST

## 2024-03-28 PROCEDURE — 97140 MANUAL THERAPY 1/> REGIONS: CPT | Performed by: PHYSICAL THERAPIST

## 2024-03-28 NOTE — PROGRESS NOTES
Daily Note         Today's date: 3/28/2024  Patient name: Rosa Kapoor  : 1940  MRN: [id number]  Referring provider: Emma Napier DO  Dx:   Encounter Diagnosis     ICD-10-CM    1. Chronic midline low back pain without sciatica  M54.50     G89.29       2. Balance disorder  R26.89       3. Other abnormalities of gait and mobility  R26.89           Subjective: Rosa Kapoor reports no significant change in symptoms after last session.         Objective: See treatment diary below    Assessment:   PLOC discussed with primary PT.  Patient c/o dizziness with upper trap stretch to right manually, resolved with resolution of the stretch. No C/O with SB to left. Patient needed only minimal cuing for proper form with TA and progressions .     Plan:  continue as indicated by primary PT        POC expires Auth Status Total   Visits  Start date  Expiration date PT/OT + Visit Limit? Co-Insurance    MEDICARE                                                  Date (Visit #) RE 3/21 (1) 3/26 (2) (3) 3/28 (4) (5)   DTM and TPR        Lumbar mobs        Lumbar Traction Trialed                Exercise Diary              Ther Ex        Active w/u             HS/glute/piri/HF stretching  tested x 3-4 mins   x5-6 mins CP  performed        Mobility (LTR, open books, cat/cow) Intro  2x15 LTR  15 x 2      Rows/ext  Red 2x10-12 each Red 2x10-12 each     Hip stability        Cerv retraction 2x10 X10 supine 10 x     GTB rows  2x10       Thoracolumbar ROM  Tested x 3-4 mins Upper quarter stretching w/ cerv distraction x 6 mins  Upper quarter stretching w/ cerv distraction x 6 mins                      Neuro Re Ed        TrA progressions   isos, march x20 each  isos w/ ad sq 2x10  TrA single leg ext 2x10  TA brace: 5 sec x 10   + march:10 x 2  + hip add iso: 5 sec x 10    + single leg ext:10 x 2       Bridge progressions W/ ad sq 2x10   2x10 ad sq         Standing pball press 2x10 into table    W/ hip abd 2x10   rev         Hip abd  progressions             pallof  Red x 10 to L, x5 to R (pain in L shoulder)       Horiz abd w/ scap sq  Green 2x10 supine  Yellow sitting x20 Yellow sitting: unable due to left shldr pain      HEP and POC review  X15 min w/ re-assessment X2-3 mins                               Ther Act             Stairs             Functional Transfers             Functional Lifting                                                                     Modalities              heat               ice               mechanical                                             3/21 RE:  Access Code: [email or web address]  URL: [email or web address]  Date: 03/21/2024  Prepared by: Sebastian Muniz    Exercises  - Supine Posterior Pelvic Tilt  - 1 x daily - 7 x weekly - 3 sets - 10 reps  - Supine March  - 1 x daily - 7 x weekly - 3 sets - 10 reps  - Supine Bridge with Mini Swiss Ball Between Knees  - 1 x daily - 7 x weekly - 3 sets - 10 reps  - Seated Abdominal Press into Swiss Ball  - 1 x daily - 7 x weekly - 3 sets - 10 reps  - Standing Bilateral Low Shoulder Row with Anchored Resistance  - 1 x daily - 7 x weekly - 3 sets - 10 reps  - Seated Cervical Retraction  - 1 x daily - 7 x weekly - 3 sets - 10 reps

## 2024-04-02 ENCOUNTER — OFFICE VISIT (OUTPATIENT)
Dept: PHYSICAL THERAPY | Facility: CLINIC | Age: 84
End: 2024-04-02
Payer: MEDICARE

## 2024-04-02 DIAGNOSIS — R26.89 BALANCE DISORDER: ICD-10-CM

## 2024-04-02 DIAGNOSIS — R26.89 OTHER ABNORMALITIES OF GAIT AND MOBILITY: ICD-10-CM

## 2024-04-02 DIAGNOSIS — M54.50 CHRONIC MIDLINE LOW BACK PAIN WITHOUT SCIATICA: Primary | ICD-10-CM

## 2024-04-02 DIAGNOSIS — G89.29 CHRONIC MIDLINE LOW BACK PAIN WITHOUT SCIATICA: Primary | ICD-10-CM

## 2024-04-02 PROCEDURE — 97140 MANUAL THERAPY 1/> REGIONS: CPT

## 2024-04-02 PROCEDURE — 97110 THERAPEUTIC EXERCISES: CPT

## 2024-04-02 PROCEDURE — 97112 NEUROMUSCULAR REEDUCATION: CPT

## 2024-04-02 NOTE — PROGRESS NOTES
Daily Note         Today's date: 2024  Patient name: Rosa Kapoor  : 1940  MRN: [id number]  Referring provider: Emma Napier DO  Dx:   Encounter Diagnosis     ICD-10-CM    1. Chronic midline low back pain without sciatica  M54.50     G89.29       2. Balance disorder  R26.89       3. Other abnormalities of gait and mobility  R26.89           Subjective: Rosa Kapoor reports minimal pain and feels okay today. She states that she has been running around all day with other appts.        Objective: See treatment diary below    Assessment:  Tolerated treatment well. Continued with POC with patient reporting minimal shoulder pain throughout. Pt responded well to upper trap stretches and cervical distractions. Needed tactile cues with pallof press exercise. Pt displayed fatigue with treatment plan and would benefit from further PT.     Plan:  Continue as indicated by primary PT        POC expires Auth Status Total   Visits  Start date  Expiration date PT/OT + Visit Limit? Co-Insurance    MEDICARE                                                  Date (Visit #) RE 3/21 (1) 3/26 (2) (3) 3/28 (4)  (5)   DTM and TPR        Lumbar mobs        Lumbar Traction Trialed                Exercise Diary              Ther Ex        Active w/u             HS/glute/piri/HF stretching  tested x 3-4 mins   x5-6 mins CP  performed   performed     Mobility (LTR, open books, cat/cow) Intro  2x15 LTR  15 x 2  15 x 2    Rows/ext  Red 2x10-12 each Red 2x10-12 each Red 2x10 ea    Hip stability        Cerv retraction 2x10 X10 supine 10 x 10x    GTB rows  2x10       Thoracolumbar ROM  Tested x 3-4 mins Upper quarter stretching w/ cerv distraction x 6 mins  Upper quarter stretching w/ cerv distraction x 6 mins  Upper quarter stretching w/ cerv distraction x 6 mins                     Neuro Re Ed        TrA progressions   isos, march x20 each  isos w/ ad sq 2x10  TrA single leg ext 2x10  TA brace: 5 sec x 10   + march:10 x 2  +  hip add iso: 5 sec x 10    + single leg ext:10 x 2       Bridge progressions W/ ad sq 2x10   2x10 ad sq         Standing pball press 2x10 into table    W/ hip abd 2x10   rev         Hip abd progressions             pallof  Red x 10 to L, x5 to R (pain in L shoulder)   Pallof press  RTB  2x10 B/L    Horiz abd w/ scap sq  Green 2x10 supine  Yellow sitting x20 Yellow sitting: unable due to left shldr pain  Yellow band   2x10    HEP and POC review  X15 min w/ re-assessment X2-3 mins                               Ther Act             Stairs             Functional Transfers             Functional Lifting                                                                     Modalities              heat               ice               mechanical                                             3/21 RE:  Access Code: [email or web address]  URL: [email or web address]  Date: 03/21/2024  Prepared by: Sebastian Muniz    Exercises  - Supine Posterior Pelvic Tilt  - 1 x daily - 7 x weekly - 3 sets - 10 reps  - Supine March  - 1 x daily - 7 x weekly - 3 sets - 10 reps  - Supine Bridge with Mini Swiss Ball Between Knees  - 1 x daily - 7 x weekly - 3 sets - 10 reps  - Seated Abdominal Press into Swiss Ball  - 1 x daily - 7 x weekly - 3 sets - 10 reps  - Standing Bilateral Low Shoulder Row with Anchored Resistance  - 1 x daily - 7 x weekly - 3 sets - 10 reps  - Seated Cervical Retraction  - 1 x daily - 7 x weekly - 3 sets - 10 reps

## 2024-04-04 ENCOUNTER — OFFICE VISIT (OUTPATIENT)
Facility: CLINIC | Age: 84
End: 2024-04-04
Payer: MEDICARE

## 2024-04-04 DIAGNOSIS — R26.89 OTHER ABNORMALITIES OF GAIT AND MOBILITY: ICD-10-CM

## 2024-04-04 DIAGNOSIS — R26.89 BALANCE DISORDER: ICD-10-CM

## 2024-04-04 DIAGNOSIS — G89.29 CHRONIC MIDLINE LOW BACK PAIN WITHOUT SCIATICA: Primary | ICD-10-CM

## 2024-04-04 DIAGNOSIS — M54.50 CHRONIC MIDLINE LOW BACK PAIN WITHOUT SCIATICA: Primary | ICD-10-CM

## 2024-04-04 PROCEDURE — 97112 NEUROMUSCULAR REEDUCATION: CPT

## 2024-04-04 NOTE — PROGRESS NOTES
Daily Note     Today's date: 2024  Patient name: Rosa Kapoor  : 1940  MRN: [id number]  Referring provider: Emma Napier DO  Dx:   Encounter Diagnosis     ICD-10-CM    1. Chronic midline low back pain without sciatica  M54.50     G89.29       2. Balance disorder  R26.89       3. Other abnormalities of gait and mobility  R26.89               Subjective: Patient present to PT with stating she has been participating with Ortho for her back pain, stating it has been helping. She reports she has been having increased left shoulder pain. Patient states she would like to continue to participate with balance center in conjunction with ortho PT.     Objective: Patient reports to PT session with no new issues or complaints.   Precautions: 2019 imaging: grade 1 anterolisthesis L4 on L5, and L5 on S1.  Severe central canal narrowing at L4-5 and L5- S1. 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  Disc bulges and facet arthropathy, foraminal narrowing. L3-4 disk osteophyte complex. See report.     NMR:  - STS w/ EC to fatigue: 12x, 13x 0UE  - Step-taps to large [place]: 20x, 2 sets 0 UE  - Step-ups to medium riverrocks while listing animals in ABC order: 25x 0UE  - Side stepping on foam beam: 4 laps 0UE  - Fwd/bwd w/ HT/HN: 5ft, 3 laps each  - Agility ladder (in/outs): 10ft, 3 laps    Assessment: Patient tolerated treatment well today with focus on balance training and functional mobility. She displays improved single leg stance balance as she demonstrated improvements in postural sway. Patient slightly limited by lower back pain and tolerance to activity. All activities performed away from parallel bars to limit utilization of upper extremity support. Plan to perform re-evaluation next session and update patient PoC. Patient will continue to benefit from skilled OPPT services to reduce overall risk for falls and maximize function.    Plan: Continue per plan of care.  Re-eval and update PoC  next session.     HEP.    -Vor x 1  -added chair poses 11/14/23      Outcome Measures Initial Eval  9/27/23 PN  11/2/23 Re-eval  12/7/23 PN  2/6/24 PN  3/11/2024    5xSTS 20.56 sec 14.5 s 12.70 sec 15.42 sec  11.71 sec    TUG  - Regular  - Cognitive   23.38 sec  50.54 sec   -12.31  -18    46% dual task cost   13.94 sec  19.10 sec    37% dual task cost   12.08 sec  -15.23 with many math errors ( by 3's  26% cost    10.37 sec  11.44 sec     10% dual task cost    10 meter defer 12.06 sec 14.86 sec  0.67 m/s 11.77 sec   0.84 m/sec 0.73 m/s    FGA 11/30 20/30 19/30 6.58 sec  22/30  Increased fall risk   22/30    mCTSIB  - FTEO (firm)  - FTEC (firm)  - FTEO (foam)  - FTEC (foam)   30 sec  30 sec  30 sec  4.75 sec     NT           -30 sec EC foam    NT    6MWT defer NT   600 feet  PO2 down to 94%  Pt needed sit rest due to  ft (limited by LBP)    [place]ales   46/56  53/56 NA        PO2  96  HR 75 bpm                 Precautions:   Past Medical History:   Diagnosis Date    Arthritis     osteo    Autoimmune hepatitis (HCC)     Back pain with radiation     to bilat legs    De Santiago esophagus     De Santiago's esophagus     Chronic GERD     Chronic uveitis     bilateral    CPAP (continuous positive airway pressure) dependence     Diabetes mellitus (HCC)     diet controlled    Disease of thyroid gland     hypo    Diverticulitis     Fibromyalgia syndrome     Fibromyalgia, primary     Hiatal hernia     Hx of colonoscopy 2016    EGD done simultaneously    Hyperlipidemia     Hypertension     Infectious viral hepatitis     PONV (postoperative nausea and vomiting)     Sinus congestion     Sleep apnea     CPAP rx, does not use mask

## 2024-04-09 ENCOUNTER — OFFICE VISIT (OUTPATIENT)
Dept: PHYSICAL THERAPY | Facility: CLINIC | Age: 84
End: 2024-04-09
Payer: MEDICARE

## 2024-04-09 DIAGNOSIS — R26.89 OTHER ABNORMALITIES OF GAIT AND MOBILITY: ICD-10-CM

## 2024-04-09 DIAGNOSIS — G89.29 CHRONIC MIDLINE LOW BACK PAIN WITHOUT SCIATICA: Primary | ICD-10-CM

## 2024-04-09 DIAGNOSIS — M54.50 CHRONIC MIDLINE LOW BACK PAIN WITHOUT SCIATICA: Primary | ICD-10-CM

## 2024-04-09 PROCEDURE — 97140 MANUAL THERAPY 1/> REGIONS: CPT | Performed by: PHYSICAL THERAPIST

## 2024-04-09 PROCEDURE — 97112 NEUROMUSCULAR REEDUCATION: CPT | Performed by: PHYSICAL THERAPIST

## 2024-04-09 PROCEDURE — 97110 THERAPEUTIC EXERCISES: CPT | Performed by: PHYSICAL THERAPIST

## 2024-04-09 NOTE — PROGRESS NOTES
"        Daily Note         Today's date: 2024  Patient name: Rosa Kapoor  : 1940  MRN: [id number]  Referring provider: Emma Napier DO  Dx:   Encounter Diagnosis     ICD-10-CM    1. Chronic midline low back pain without sciatica  M54.50     G89.29       2. Other abnormalities of gait and mobility  R26.89           Subjective: Rosa Kapoor reports she had pain all weekend and overnight last night. Describes the only different activity was doing the bands in PT.  Describes pulling band apart.   Pain ws in area of left cervical area and into shoulder . States she was informed she needed a shoulder replacement \"couple years ago\" but decided against it.  Entering clinic today: low back pain when walking, no cervical pain.         Objective: See treatment diary below    Assessment:   patient reports her neck shoulder area pain resolved after manual stretching.  Progressed program as per treatment diary.  Despite recruiting TA and having UE support, demonstrated reduced stance tolerance on left with right hip abduction. Patient overall has good awareness with TA in standing activities.    Adjusted banded activities due to report of inc pain after last session.      Plan: Progress note during next visit.             POC expires Auth Status Total   Visits  Start date  Expiration date PT/OT + Visit Limit? Co-Insurance    MEDICARE                                                  Date (Visit #) RE 3/21 (1) 3/26 (2) (3) 3/28 (4)  (5) 23   DTM and TPR        Lumbar mobs        Lumbar Traction Trialed     Left upper trap stretch , levator scap stretch 10 sec x 5             Exercise Diary              Ther Ex        Active w/u             HS/glute/piri/HF stretching  tested x 3-4 mins   x5-6 mins CP  performed   performed Performed    Mobility (LTR, open books, cat/cow) Intro  2x15 LTR  15 x 2  15 x 2 LTR: 15 x 2    Rows/ext  Red 2x10-12 each Red 2x10-12 each Red 2x10 ea Red Next visit    Hip stability      "   Cerv retraction 2x10 X10 supine 10 x 10x    GTB rows  2x10       Thoracolumbar ROM  Tested x 3-4 mins Upper quarter stretching w/ cerv distraction x 6 mins  Upper quarter stretching w/ cerv distraction x 6 mins  Upper quarter stretching w/ cerv distraction x 6 mins  Left upper trap stretch , levator scap stretch 10 sec x 5                    Neuro Re Ed        TrA progressions   isos, march x20 each  isos w/ ad sq 2x10  TrA single leg ext 2x10  TA brace: 5 sec x 10   + march:10 x 2  + hip add iso: 5 sec x 10    + single leg ext:10 x 2         Bridge progressions W/ ad sq 2x10   2x10 ad sq      bridge: standard: 8 x  + hip add iso: 10 x   + hip abd iso:red 8 x    Standing pball press 2x10 into table    W/ hip abd 2x10   rev      standing TA ball vs table: 5 sec x 10    + march:10 x 2 orlni   + hip abd: 10 x 2 orlin   + hip ext: 10 x 2   Hip abd progressions             pallof  Red x 10 to L, x5 to R (pain in L shoulder)   Pallof press  RTB  2x10 B/L Red band close to body: lateral stepping (3) 10 x each side    Horiz abd w/ scap sq  Green 2x10 supine  Yellow sitting x20 Yellow sitting: unable due to left shldr pain  Yellow band   2x10 hold   HEP and POC review  X15 min w/ re-assessment X2-3 mins                               Ther Act             Stairs             Functional Transfers             Functional Lifting                                                                     Modalities              heat               ice               mechanical                                             3/21 RE:  Access Code: [email or web address]  URL: [email or web address]  Date: 03/21/2024  Prepared by: Sebastian Muniz    Exercises  - Supine Posterior Pelvic Tilt  - 1 x daily - 7 x weekly - 3 sets - 10 reps  - Supine March  - 1 x daily - 7 x weekly - 3 sets - 10 reps  - Supine Bridge with Mini Swiss Ball Between Knees  - 1 x daily - 7 x weekly - 3 sets - 10 reps  - Seated Abdominal Press into Swiss Ball  - 1 x daily - 7  x weekly - 3 sets - 10 reps  - Standing Bilateral Low Shoulder Row with Anchored Resistance  - 1 x daily - 7 x weekly - 3 sets - 10 reps  - Seated Cervical Retraction  - 1 x daily - 7 x weekly - 3 sets - 10 reps

## 2024-04-11 ENCOUNTER — EVALUATION (OUTPATIENT)
Facility: CLINIC | Age: 84
End: 2024-04-11
Payer: MEDICARE

## 2024-04-11 DIAGNOSIS — R26.89 OTHER ABNORMALITIES OF GAIT AND MOBILITY: Primary | ICD-10-CM

## 2024-04-11 DIAGNOSIS — R26.89 BALANCE DISORDER: ICD-10-CM

## 2024-04-11 PROCEDURE — 97530 THERAPEUTIC ACTIVITIES: CPT | Performed by: PHYSICAL THERAPIST

## 2024-04-11 NOTE — PROGRESS NOTES
PT Re-Evaluation /PROGRESS NOTE         POC expires Auth Status Total   Visits  Start date  Expiration date PT/OT + Visit Limit? Co-Insurance   24 N/A N/A N/A N/A PT, MAJO No and $0 Co-pay                                                  Today's date: 2024  Patient name: Rosa Kapoor  : 1940  MRN: [id number]  Referring provider: Emma Napier DO  Dx:   Encounter Diagnosis     ICD-10-CM    1. Other abnormalities of gait and mobility  R26.89       2. Balance disorder  R26.89                 Assessment    Update 24: Patient is a 83 y.o. Female who presents to skilled outpatient PT with imbalance, dizziness and difficulty with stair negotiation affecting her functional indpendence and ability to participate in her community . Patient's past medical history significant for falls, controlled HTN, and substantial surgical history. Overall patient reports that she has felt improvements with her gait and balance but continues to be bothered by low back pain. She is also currently being treated by orthopedic therapy for her low back pain. Since her most recent progress note patient maintains similar values for most of her objective measures including 5xSTS, 6 MWT, 10 MWT, and FGA. However her improved score of 23/30 on FGA puts her above increased risk of falls value. She was most challenged today with her TUG, requiring significant increased time to complete similar to previously tested values. Based on her overall plateau in progress, plan to continue with 2 additional sessions to provide patient with HEP prior to discharge. Also plan to have patient evaluated for possible transition to chair yoga class to maintain gains previously made and maximize patient's level of function and independence. Discussed plan with patient who is in agreement.     Updated 3/11/2024: Patient reports for reassessment with deficits in strength, balance,  and mobility. At this time, patient feels her greatest limitations include LBP and decreased community mobility as a result. Patient demonstrates overall improvements in the following outcome measures since last reassessment: 5 x STS and TUG (regular and cognitive), likely indicating overall gains in functional LE strength, safe functional mobility, and dual tasking ability. Patient has otherwise demonstrated general plateau in ambulation speed and 6 MWT. Patient limited in 6 MWT in large part due to onset of LBP with prolonged ambulation. Per cutoff scores for the FGA she continues to be classified as borderline risk for falls. However, based on patient's performance for TUG cognitive, she demonstrates a 10% difference which puts her at low risk for falls and demonstrates improvement in dual tasking ability compared with previous assessments. Due to patient's persistent LBP in addition to primary goal being to improve ability to ambulate community distances, discussed with patient the benefit or ortho PT reassessment. Plan is for patient to transition to ortho PT services in next 1-2 weeks in order to address deficits most relevant to her functional limitations, with patient in agreement at this time. She has met one additional short term goal at this time.      Updated Progress Note 2/6/24:  Restart of PT after extended break due to unknown viral illness requiring several weeks to recover.  Patient returns after absence since 12/14/23.   Objective measures indicated that her gait speed over 10 M does not meet age norm, but qualifies her as a community ambulator.  During 6 MWT she had to rest during the test due to LBP.  Her score of 600 feet is well below age norm of 1286 feet.  Bales Score of 53/56 indicates no increased risk for falls.  She displays only slowing during gait with head turns, not veering. FGA score of 22/30 indicates increased fall risk.  5XSTS score has declined since pt was last here, to 15.42  sec, and is slower than age norm.  TUG regular is within safety range, but there was a 26% cost for TUG cog, with multiple math errors counting backwards by 3's. Patient would benefit from continued PT with focus on posterior pelvic tilting during walking/standing/function to prevent onset of LBP that is likely due to stenosis. Focus also on mental dual tasking for improved safety in home and community.  Advise continued PT per POC.  Patient is in agreement with plan.        UPDATED 12/7/23: Rosa had VNG this afternoon and had no response to calorics which could indicate bilateral hypofunction. Otherwise no abnormal findings from VNG. She demonstrates improvements in 5x STS and is now deemed low fall risk. She remains high fall risk per TUG and TUG cog, however dual task cost is decreasing. She also remains high fall risk per FGA and gait speed. Gait speed indicates limited community ambulator. She continues to veer with head turns during gait. She tested + for orthostatic hypotension last visit and we reached out to PCP regarding it. Her chief complaint at this time is imbalance more than the dizziness. Discussed plan moving forward to focus on dynamic balance and gait for the next month, and then re-assess for improvements. Pt in agreement with this plan.     UPDATED 11/2/23: Rosa has attended PT for the past month for the diagnoses above. She reports feeling more agile since starting PT. She continues to report dizziness and has tested (+) for orthostatic hypotension, and displays significant motion sensitivity.    Objective measures:  5XSTS improved from 20 to 14.5 seconds improved by more than the MDC 2.3 sec.  (14.8 sec for age/gender norm.)  TUG improved from 23 to 12 seconds, more than 4.4 MDC and better than 13.5 safety cutoff.   TUG cog improved from 50 to 18 seconds.  FGA improved from 11 to 20/30. She remains high fall risk. She continues to demonstrate significant veering during gait with horizontal  "head turns. She must slow prior to stepping over a 6\" obstacles.   Bales score 46/56 indicates she remains high fall risk.      Goal completion:  Patient has met several  STG goals  and 1 LTG with progress towards additional goals, at this time.     Recommendations:    Advise continued PT per POC, to continue vestibular therapy with habituation, and dynamic gait/balance training to improve mobility to safe level in the community.   Rosa is in aggreement with this plan.   Plan for 6MWT next visit.     Initial PT Assessment details: Patient is a 83 y.o. Female who presents to skilled outpatient PT with imbalance, dizziness and difficulty with stair negotiation affecting her functional indpendence and ability to participate in her community . Patient's past medical history significant for falls, controlled HTN, and substantial surgical history. She demonstrates deficits in muscular strength, ambulatory safety, dual tasking, dynamic and static balance as per scores on 5x STS, TUG and TUG cog, FGA, and mCTSIB respectively. As per APTA and Rehab Measure cuttoff scores, Rosa categorizes at a HIGH risk for falls with respect to the aforementioned outcome measures. Importantly, patient with occurrence of subjective dizziness symptoms following position changes (supine to stand,) ambulation with head turns, backwards walking and activities where vision was occluded. This could indicate a vestibular system dysfunction, plan to perform oculomotor and cervical screen to assess possible vestibular system involvement, as well as screen for orthostatic hypotension. Patient demonstrates high reliance on visual system for dynamic and static balance activities further perpetuating her risk for falls. Unable to assess gait speed and cardiovascular endurance today due to time constraints, plan to assess in future session to appropriate educate on use of assisted device while ambulating in her community. She will benefit from skilled PT " to target balance deficits to improve patient overall function, decrease risk for fall to promote independence and participation in her community.     Impairments: Abnormal coordination, Abnormal gait, Abnormal muscle tone, Abnormal or restricted ROM, Activity intolerance, Impaired balance, Impaired physical strength, Lacks appropriate HEP, Poor posture, Poor body mechanics, Pain with function, Safety issue, Weight-bearing intolerance, Abnormal movement, Difficulty understanding, Abnormal muscle firing  Understanding of Dx/Px/POC: Good  Prognosis: Good    Patient verbalized understanding of POC.         Please contact me if you have any questions or recommendations. Thank you for the referral and the opportunity to share in Rosa Kapoor's care.        Plan  Plan details: balance training, strengthening, vestibular training  Patient would benefit from: Skilled PT  Planned modality interventions: Biofeedback, Cryotherapy, TENS, Thermotherapy  Planned therapy interventions: Abdominal trunk stabilization, ADL training, Balance, Balance/WB training, Breathing training, Body mechanics training, Coordination, Functional ROM exercises, Gait training, HEP, Joint Mobilization, Manual Therapy, Felix taping, Motor coordination training, Neuromuscular re-education, Patient education, Postural training, Strengthening, Stretching, Therapeutic activities, Therapeutic exercises, Therapeutic training, Transfer training, Activity modification, Work reintegration  Frequency: 2x/wk  Duration in weeks: 12  Plan of Care beginning date: 4/8/24  Plan of Care expiration date: 8 weeks - 6/3/2024  Treatment plan discussed with: Patient and Family       Goals  Short Term Goals (4 weeks):    - Patient will improve time on TUG by 2.9 seconds to facilitate improved safety in all ambulation MET   - Patient will be independent in basic HEP 2-3 weeks -MET  - Patient will improve 5xSTS score by 2.3 seconds to promote improved LE functional  strength needed for ADLs -MET   - Patient will reduce dual task cost to < 25% to improve balance and ability to dual task. - MET    Long Term Goals (12 weeks):  - Patient will be independent in a comprehensive home exercise program-NOT MET  - Patient will improve gait speed to 1.0 m/s or greater to improve safety with community ambulation- NOT MET, IMPROVING  - Patient will improve scoring on FGA by 4 points to progress safety with dynamic tasks- MET   - Patient will be able to demonstrate HT in gait without veering-MET   - Patient will report going on walks at least 3 days per week to promote independence and improved cardiovascular endurance- NOT MET  - Patient will report 50% reduction in near falls when ambulating on uneven terrain- NOT MET  - Patient will reduce dual task cost to < 10% to improve balance and ability to dual task. - NOT MET  - Patient will increase FGA score to 25/30 to reduce fall risk. NOT MET      Cut off score    All date taken from APTA Neuro Section or Rehab Measures      Bales/56  MDC: 6 pts  Age Norms:  60-69: M - 55   F - 55  70-79: M - 54   F - 53  80-89: M - 53   F - 50 5xSTS: Mallory et al 2010  MDC: 2.3 sec  Age Norms:  60-69: 11.1 sec  70-79: 12.6 sec  80-89: 14.8 sec   TUG  MDC: 4.14 sec  Cut off score:  >13.5 sec community dwelling adults  >32.2 frail elderly  <20 I for basic transfers  >30 dependent on transfers 10 Meter Walk Test: Genaro et al 2011  MDC: 0.18 m/s  20-29: M - 1.35 m   F - 1.34 m  30-39: M - 1.43 m   F - 1.34 m  40-49: M - 1.43 m   F - 1.39 m  50-59: M - 1.43 m   F - 1.31 m  60-69: M - 1.34 m   F - 1.24 m  70-79: M - 1.26 m   F - 1.13 m  80-89: M - 0.97 m   F - 0.94 m    Household Ambulator < 0.4 m/s  Limited Community Ambulator 0.4 - 0.8 m/s  Community Ambulator 0.8 - 1.2 m/s  Safely cross the street > 1.2 m/s   FGA  MCID: 4 pts  Geriatrics/community < 22/30 fall risk  Geriatrics/community < 20/30 unexplained falls    DGI  MDC: vestibular - 4  "pts  MDC: geriatric/community - 3 pts  Falls risk <19/24 mCTSIB  Norm: 20-60 yrs  Eyes open firm: norm sway 0.21-0.48  Eyes closed firm: norm sway 0.48-0.99  Eyes open foam: norm sway 0.38-0.71  Eyes closed foam: norm sway 0.70-2.22   6 Minute Walk Test  MDC: 190.98 ft  MCID: 164 ft    Age Norms  60-69: M - 1876 ft (571.80 m)  F - 1765 ft (537.98 m)  70-79: M - 1729 ft (527.00 m)  F - 1545 ft (470.92 m)  80-89: M - 1368 ft (416.97 m)  F - 1286 ft (391.97 m) ABC: Zaida & Carlos Manuel, 2003  <67% increased risk for falls         Subjective    Updated (3/11/2023): Patient reports that she wants to continue on her community mobility and longer distance ambulating. She reports she continues to feel limited by LBP with increased mobility. She reports that her dizziness has been \"pretty good\" and feels her blood pressure has stabilized, which has helped.    Update 12/7/23: Pt reports she feels like her walking has actually gotten better. She had VNG today and she had no response to calorics which could indicate bilateral hypofunction (no central signs and negative for BPPV). She would like to be able to walk more, but sometimes her back hurts. Wants to be able to walk without \"solange-tottering\" outside in open environments.     Updated 11/2/23: Patient reports feeling more agile since starting PT one month ago.      Initial assessment:History of Present Illness  - Mechanism of injury: Patient is an 83 year old female presenting to PT with complaints of difficulty climbing stairs, imbalance and history of falls. Past medical history is significant for controlled HTN, L hip pain, and substantial surgical history. She reports she's only had one recent fall (August 2023) which occurred because she missed a step, lost her balance anteriorly and landed on her R hand and face. She is currently attending hand therapy for subsequent fracture in R hand. Rosa reports feelings of dizziness described as \"lightheaded and floaty\" that " accompanies imbalance especially when waking up in the middle of the night and walking in the dark. She reports dizziness does not last longer than a couple of seconds and denies any falls proceeded by described dizzines. Patient does not currently ambulate with an AD but her daughter has purchased her a SPC due to her unsteadiness.     - Primary AD: daughter bought her a cane recently, no ad at home or in community.   - Assist level at home: independent  - Decreased fine motor tasks: No      Pain  - Current pain ratin/10  - At best pain ratin/10  - At worst pain ratin-7/10  - Location: L buttock, hip  - Aggravating factors: standing, morning    Social Support  - Steps to enter house: none, 1 garage steps  - Stairs in house: no steps, ranch style house  - Lives in: ranch style house  - Lives with:  and daughter visits occasionally    - Employment status: retired,  for [employer]  - Hand dominance: R handed    Treatments  - Previous treatment: Hand therapy, ortho PT for shoulder   - Current treatment: Hand therapy  - Diagnostic Testing: x-ray from fall of hand.       Objective     LE MMT  - R Hip Flexion: 3+/5  L Hip Flexion: 4-/5  - R Hip Extension: 4/5  L Hip Extension: 4/5  - R Hip Abduction: 4/5  L Hip Abduction: 4/5  - R Hip Adduction: 4-/5  L Hip Adduction: 4+/5  - R Knee Extension: 4/5 (pain in R quad)  L Knee Extension: 5/5  - R Knee Flexion: 5/5  L Knee Flexion: 5/5  - R Ankle DF: 4-/5   L Ankle DF: 4/5  - R Ankle PF: 5/5   L Ankle PF: 5/5    Sensation  - Light touch: normla, B/L numbness at lateral aspects of shins    Coordination  - Heel to Shin: Normal, weakness evident  - Alternate Toe Taps: normal  - Finger to Nose: normal    Myelopathy Screen (>3/5 +)  - Lowry's Reflex: -  - Inverted Supinator Sign: -  - Age > 45: Yes  - Gait Deviation: No      Gait  - Abnormalities: decreased gait speed, decreased trunk rotation, decreased B/L foot clearance          Outcome Measures Initial Eval  9/27/23 PN  11/2/23 Re-eval  12/7/23 PN  2/6/24 PN  3/11/2024 PN  4/11/2024   5xSTS 20.56 sec 14.5 s 12.70 sec 15.42 sec  11.71 sec 11.2 sec   TUG  - Regular  - Cognitive   23.38 sec  50.54 sec   -12.31  -18    46% dual task cost   13.94 sec  19.10 sec    37% dual task cost   12.08 sec  -15.23 with many math errors ( by 3's  26% cost    10.37 sec  11.44 sec     10% dual task cost   12.75 sec  14.88 sec   10 meter defer 12.06 sec 14.86 sec  0.67 m/s 11.77 sec   0.84 m/sec 0.73 m/s 0.81 m/s   FGA 11/30 20/30 19/30 6.58 sec  22/30  Increased fall risk   22/30 23/30   mCTSIB  - FTEO (firm)  - FTEC (firm)  - FTEO (foam)  - FTEC (foam)   30 sec  30 sec  30 sec  4.75 sec     NT           -30 sec EC foam    NT    6MWT defer NT   600 feet  PO2 down to 94%  Pt needed sit rest due to  ft (limited by LBP) 660 ft   Bales   46/56  53/56 NA        PO2  96  HR 75 bpm          Precautions:   Past Medical History:   Diagnosis Date    Arthritis     osteo    Autoimmune hepatitis (HCC)     Back pain with radiation     to bilat legs    De Santiago esophagus     De Santiago's esophagus     Chronic GERD     Chronic uveitis     bilateral    CPAP (continuous positive airway pressure) dependence     Diabetes mellitus (HCC)     diet controlled    Disease of thyroid gland     hypo    Diverticulitis     Femur fracture (HCC)     Fibromyalgia syndrome     Fibromyalgia, primary     Fractures     Hiatal hernia     Hx of colonoscopy 2016    EGD done simultaneously    Hyperlipidemia     Hypertension     Infectious viral hepatitis     Osteoarthritis     PONV (postoperative nausea and vomiting)     Sinus congestion     Sleep apnea     CPAP rx, does not use mask

## 2024-04-16 ENCOUNTER — EVALUATION (OUTPATIENT)
Dept: PHYSICAL THERAPY | Facility: CLINIC | Age: 84
End: 2024-04-16
Payer: MEDICARE

## 2024-04-16 DIAGNOSIS — M54.50 CHRONIC MIDLINE LOW BACK PAIN WITHOUT SCIATICA: Primary | ICD-10-CM

## 2024-04-16 DIAGNOSIS — G89.29 CHRONIC MIDLINE LOW BACK PAIN WITHOUT SCIATICA: Primary | ICD-10-CM

## 2024-04-16 PROCEDURE — 97110 THERAPEUTIC EXERCISES: CPT

## 2024-04-16 PROCEDURE — 97112 NEUROMUSCULAR REEDUCATION: CPT

## 2024-04-16 NOTE — PROGRESS NOTES
Progress Note     Today's date: 2024  Patient name: Rosa Kapoor  : 1940  MRN: [id number]  Referring provider: Emma Napier DO  Dx:   Encounter Diagnosis     ICD-10-CM    1. Chronic midline low back pain without sciatica  M54.50 PT plan of care cert/re-cert    G89.29           Start Time: 1415  Stop Time: 1500  Total time in clinic (min): 45 minutes    Subjective: Pt reports that low back pain is about 3-4/10 today. Notes that she feels PT is helpful, finds it hard to do exercises on own. Notes that pain in morning is slightly better, was getting up to 8/10. No worse than 6/10 today. Feels that she is more agile, better able to move w/ less overall pain. Would like to continue w/ skilled PT. Functional update below:      Current pain: 3-4/10  At best: 0/10   At worst: 6/10 - L side low back in morning    Short term goals (3 weeks): ALL PROGRESSED  1) Pt will improve thoracolumbar mobility deficits by 25% pain free.  2) Pt will improve B LE and core strength deficits by 1/3 grade MMT.  3) Pt will improve pain at worse to <4/10.   4) Pt will initiate and progress HEP w/ special emphasis on functional core control and thoracolumbar mobility.    Long term goals (6 weeks) ALL PROGRESSED  1) Pt will stand and ambulate for community distances w/o limitation due to low back.   2) Pt will sleep through the night in positioning of choosing 6/7 nights a week w/o waking due to pain.  3) Pt will perform all household activities for two full weeks w/o modification due to low back pain.  4) Pt will be independent and compliant w/ HEP in order to maximize functional benefit of skilled PT following d/c.      *all goals extended by 2 and 4 weeks, respectively     Objective:   Thoracolumbar   Flex: 50% stiff w/ min pain  Ext: 25% w/ min pain   SB R: 33%   SB L: 33% w/ pain in L side   Rot R: 50% w/ min pain in R side low back  Rot L: 50% w/ min pain in R side low back    :  Thoracolumbar  Flex: 75%  Ext: 50% no  pain  SB R: 50%   SB L: 50%  Rot R: 60%   Rot L: 60%     Hip strength:  Grossly 4-/5 throughout    -4/16: grossly 4-/5 throughout    Core: no greater than 1+/5 w/ heavy cueing required for form and pacing.   4/16: at least 2/5 w/ quick fatigue     Repeated motions not tested today   -4/16: tolerates extension more fully, repeated motions not done    Ambulates w/ forward flexed posture, decreased step length L>R, min antalgic    -4/16: improved upright posture w/ better pacing. Equal step length and speed, quick fatigue        Assessment: Tolerated treatment well. Patient demonstrated fatigue post treatment and would benefit from continued PT. Does well w/ exercise progressions today. She is making good objective progress in strength, functional spine ROM, and quality of motion. Pain is slightly higher at rest, but better on average and at worst. She would like to continue w/ ortho/neuro split, no new complaints to finish session. Plan to continue for next 4 weeks, re-assess and progress accordingly. Pt in agreement w/ plan.       Plan: Continue per plan of care. Loaded strength work as able, gentle carries, hinge vs squat, single leg strength work       Precautions: standard  POC expires Auth Status Total   Visits  Start date  Expiration date PT/OT + Visit Limit? Co-Insurance    MEDICARE                                                  Date (Visit #) RE 3/21 (1) 3/26 (2) (3) 3/28 (4) 4/2 (5) 4/9/23 4/16/24 (6) PN   DTM and TPR         Lumbar mobs         Lumbar Traction Trialed     Left upper trap stretch , levator scap stretch 10 sec x 5               Exercise Diary               Ther Ex         Active w/u              HS/glute/piri/HF stretching  tested x 3-4 mins   x5-6 mins CP  performed   performed Performed  X7 mins CP   Mobility (LTR, open books, cat/cow) Intro  2x15 LTR  15 x 2  15 x 2 LTR: 15 x 2  3x15 total   Rows/ext  Red 2x10-12 each Red 2x10-12 each Red 2x10 ea Red Next visit  Red x10 only   Hip  stability         Cerv retraction 2x10 X10 supine 10 x 10x  rev   GTB rows  2x10        Thoracolumbar ROM  Tested x 3-4 mins Upper quarter stretching w/ cerv distraction x 6 mins  Upper quarter stretching w/ cerv distraction x 6 mins  Upper quarter stretching w/ cerv distraction x 6 mins  Left upper trap stretch , levator scap stretch 10 sec x 5  rev                     Neuro Re Ed         TrA progressions   isos, march x20 each  isos w/ ad sq 2x10  TrA single leg ext 2x10  TA brace: 5 sec x 10   + march:10 x 2  + hip add iso: 5 sec x 10    + single leg ext:10 x 2       TrA isos x10  March x10  Single leg ext x10-15    Double leg march to 90/90 2x6   Bridge progressions W/ ad sq 2x10   2x10 ad sq      bridge: standard: 8 x  + hip add iso: 10 x   + hip abd iso:red 8 x  Reg x20   Standing pball press 2x10 into table    W/ hip abd 2x10   rev      standing TA ball vs table: 5 sec x 10    + march:10 x 2 orlin   + hip abd: 10 x 2 orlin   + hip ext: 10 x 2 Reg x10    March 2x10       Hip abd progressions           Side step light green loop 10'x10   pallof  Red x 10 to L, x5 to R (pain in L shoulder)   Pallof press  RTB  2x10 B/L Red band close to body: lateral stepping (3) 10 x each side  Pallof red 2x10   Horiz abd w/ scap sq  Green 2x10 supine  Yellow sitting x20 Yellow sitting: unable due to left shldr pain  Yellow band   2x10 hold no   HEP and POC review  X15 min w/ re-assessment X2-3 mins     Rev of HEP, POC discussion x 5 mins                              Ther Act              Stairs              Functional Transfers              Functional Lifting                                                                          Modalities               heat                ice                mechanical                                                3/21 RE:  Access Code: [email or web address]  URL: [email or web address]  Date: 03/21/2024  Prepared by: Sebastian Muniz    Exercises  - Supine Posterior Pelvic Tilt  - 1 x daily -  7 x weekly - 3 sets - 10 reps  - Supine March  - 1 x daily - 7 x weekly - 3 sets - 10 reps  - Supine Bridge with Mini Swiss Ball Between Knees  - 1 x daily - 7 x weekly - 3 sets - 10 reps  - Seated Abdominal Press into Swiss Ball  - 1 x daily - 7 x weekly - 3 sets - 10 reps  - Standing Bilateral Low Shoulder Row with Anchored Resistance  - 1 x daily - 7 x weekly - 3 sets - 10 reps  - Seated Cervical Retraction  - 1 x daily - 7 x weekly - 3 sets - 10 reps

## 2024-04-18 ENCOUNTER — APPOINTMENT (OUTPATIENT)
Facility: CLINIC | Age: 84
End: 2024-04-18
Payer: MEDICARE

## 2024-04-18 NOTE — PROGRESS NOTES
"Daily Note     Today's date: 3/13/2024  Patient name: Rosa Kapoor  : 1940  MRN: [id number]  Referring provider: Emma Napier DO  Dx:   Encounter Diagnosis     ICD-10-CM    1. Balance disorder  R26.89       2. Other abnormalities of gait and mobility  R26.89           Subjective: Patient present to PT with no new complaints.     Objective: Patient reports to PT session with no new issues or complaints.   Precautions: 2019 imaging: grade 1 anterolisthesis L4 on L5, and L5 on S1.  Severe central canal narrowing at L4-5 and L5- S1. 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  Disc bulges and facet arthropathy, foraminal narrowing. L3-4 disk osteophyte complex. See report.     NMR:  - STS to fatigue: 15x, 15x   - Step-taps to large [place] with 5.5# RR: 20x, 0 UE  - Side stepping on foam beam + finding ABC letters on wall   - Step-ups to medium [place]ro[place] w/ HT/HN 10x: 5x each  - Bwd ambulation holding red PBall 2 laps, while stacking cones + tandem walking forward, 4 laps   - Step-ups 6\" to fatigue: 20x, 0UE    Assessment: Patient tolerated treatment well today with focus on balance training and functional mobility. Added side stepping on foam beam + finding letters on wall to encourage head turns, improve proprioceptive input and dynamic balance. Increased symptoms of dizziness with head turns on RR, however she recovered to her baseline within 15 seconds. Medial/lateral instability noted with tandem ambulation; able to recover with stepping strategy when outside her PERCY. Patient will continue to benefit from skilled OPPT services to reduce overall risk for falls and maximize function.    Plan: Continue per plan of care.      HEP.    -Vor x 1  -added chair poses 23    Outcome Measures Initial Eval  23 PN  23 Re-eval  23     5xSTS 20.56 sec 14.5 s 12.70 sec 15.42 sec      TUG  - Regular  - Cognitive   23.38 sec  50.54 sec   -12.31  -18    46% dual task cost "   13.94 sec  19.10 sec    37% dual task cost 12.08 sec  -15.23 with many math errors ( by 3's  26% cost      10 meter defer 12.06 sec 14.86 sec  0.67 m/s 11.77 sec   0.84 m/sec     FGA 11/30 20/30 19/30 6.58 sec  22/30  Increased fall risk       mCTSIB  - FTEO (firm)  - FTEC (firm)  - FTEO (foam)  - FTEC (foam)   30 sec  30 sec  30 sec  4.75 sec     NT           -30 sec EC foam      6MWT defer NT   600 feet  PO2 down to 94%  Pt needed sit rest due to LBP     Bales   46/56  53/56         PO2  96  HR 75 bpm                 Precautions:   Past Medical History:   Diagnosis Date    Arthritis     osteo    Autoimmune hepatitis (HCC)     Back pain with radiation     to bilat legs    De Santiago esophagus     De Santiago's esophagus     Chronic GERD     Chronic uveitis     bilateral    CPAP (continuous positive airway pressure) dependence     Diabetes mellitus (HCC)     diet controlled    Disease of thyroid gland     hypo    Diverticulitis     Fibromyalgia syndrome     Fibromyalgia, primary     Hiatal hernia     Hx of colonoscopy 2016    EGD done simultaneously    Hyperlipidemia     Hypertension     Infectious viral hepatitis     PONV (postoperative nausea and vomiting)     Sinus congestion     Sleep apnea     CPAP rx, does not use mask                      Patient

## 2024-04-19 ENCOUNTER — RA CDI HCC (OUTPATIENT)
Dept: OTHER | Facility: HOSPITAL | Age: 84
End: 2024-04-19

## 2024-04-22 DIAGNOSIS — I10 ESSENTIAL HYPERTENSION: ICD-10-CM

## 2024-04-22 DIAGNOSIS — K21.00 GASTROESOPHAGEAL REFLUX DISEASE WITH ESOPHAGITIS WITHOUT HEMORRHAGE: ICD-10-CM

## 2024-04-22 RX ORDER — FAMOTIDINE 20 MG/1
20 TABLET, FILM COATED ORAL 2 TIMES DAILY
Qty: 180 TABLET | Refills: 1 | Status: SHIPPED | OUTPATIENT
Start: 2024-04-22

## 2024-04-22 RX ORDER — CARVEDILOL 12.5 MG/1
12.5 TABLET ORAL 2 TIMES DAILY WITH MEALS
Qty: 180 TABLET | Refills: 1 | Status: SHIPPED | OUTPATIENT
Start: 2024-04-22

## 2024-04-23 ENCOUNTER — OFFICE VISIT (OUTPATIENT)
Dept: PHYSICAL THERAPY | Facility: CLINIC | Age: 84
End: 2024-04-23
Payer: MEDICARE

## 2024-04-23 DIAGNOSIS — G89.29 CHRONIC MIDLINE LOW BACK PAIN WITHOUT SCIATICA: Primary | ICD-10-CM

## 2024-04-23 DIAGNOSIS — M54.50 CHRONIC MIDLINE LOW BACK PAIN WITHOUT SCIATICA: Primary | ICD-10-CM

## 2024-04-23 PROCEDURE — 97112 NEUROMUSCULAR REEDUCATION: CPT

## 2024-04-23 PROCEDURE — 97110 THERAPEUTIC EXERCISES: CPT

## 2024-04-23 NOTE — PROGRESS NOTES
Daily Note     Today's date: 2024  Patient name: Rosa Kapoor  : 1940  MRN: [id number]  Referring provider: Emma Napier DO  Dx:   Encounter Diagnosis     ICD-10-CM    1. Chronic midline low back pain without sciatica  M54.50     G89.29                      Subjective: Pt reports that low back is a bit stiff today. Notes that L shoulder and neck are very sore, have been main problem as of late. Feels that this isn't improving.      Objective: does not tolerate strength work on L UE - pain in first 2-3 reps of all exercises      Assessment: Tolerated treatment fair. Patient demonstrated fatigue post treatment and would benefit from continued PT. Poor tolerance to functional exercise today. Was given referral for ortho office to have shoulder checked. May benefit from injection therapy or other pain relieving measures. Will benefit from strengthening as long as pain is not limiting basic motions.       Plan: Continue per plan of care. Check back on shoulder and POC     Precautions: standard  POC expires Auth Status Total   Visits  Start date  Expiration date PT/OT + Visit Limit? Co-Insurance    MEDICARE                                                  Date (Visit #) RE 3/21 (1) 3/26 (2) (3) 3/28 (4)  (5) 23 (6) PN 24 (7)   DTM and TPR          Lumbar mobs          Lumbar Traction Trialed     Left upper trap stretch , levator scap stretch 10 sec x 5                 Exercise Diary                Ther Ex          Active w/u               HS/glute/piri/HF stretching  tested x 3-4 mins   x5-6 mins CP  performed   performed Performed  X7 mins CP X4 mins   Mobility (LTR, open books, cat/cow) Intro  2x15 LTR  15 x 2  15 x 2 LTR: 15 x 2  3x15 total x20   Rows/ext  Red 2x10-12 each Red 2x10-12 each Red 2x10 ea Red Next visit  Red x10 only No  W's BW x5 - deferred    Hip stability          Cerv retraction 2x10 X10 supine 10 x 10x  rev    GTB rows  2x10         Thoracolumbar ROM  Tested x 3-4  mins Upper quarter stretching w/ cerv distraction x 6 mins  Upper quarter stretching w/ cerv distraction x 6 mins  Upper quarter stretching w/ cerv distraction x 6 mins  Left upper trap stretch , levator scap stretch 10 sec x 5  rev X10 mins                       Neuro Re Ed          TrA progressions   isos, march x20 each  isos w/ ad sq 2x10  TrA single leg ext 2x10  TA brace: 5 sec x 10   + march:10 x 2  + hip add iso: 5 sec x 10    + single leg ext:10 x 2       TrA isos x10  March x10  Single leg ext x10-15    Double leg march to 90/90 2x6 Isos x10   Bridge progressions W/ ad sq 2x10   2x10 ad sq      bridge: standard: 8 x  + hip add iso: 10 x   + hip abd iso:red 8 x  Reg x20    Standing pball press 2x10 into table    W/ hip abd 2x10   rev      standing TA ball vs table: 5 sec x 10    + march:10 x 2 orlin   + hip abd: 10 x 2 orlin   + hip ext: 10 x 2 Reg x10    March 2x10        Hip abd progressions           Side step light green loop 10'x10 Seated scap sq w/ cerv retraction 2x10   pallof  Red x 10 to L, x5 to R (pain in L shoulder)   Pallof press  RTB  2x10 B/L Red band close to body: lateral stepping (3) 10 x each side  Pallof red 2x10 no   Horiz abd w/ scap sq  Green 2x10 supine  Yellow sitting x20 Yellow sitting: unable due to left shldr pain  Yellow band   2x10 hold no Wall ball cw/ccw x10 - deferred   HEP and POC review  X15 min w/ re-assessment X2-3 mins     Rev of HEP, POC discussion x 5 mins X5 mins w/ sx discussion and POC rev                                 Ther Act               Stairs               Functional Transfers               Functional Lifting                                                                               Modalities                heat                 ice                 mechanical                                                   3/21 RE:  Access Code: [email or web address]  URL: [email or web address]  Date: 03/21/2024  Prepared by: Sebastian Muniz    Exercises  - Supine  Posterior Pelvic Tilt  - 1 x daily - 7 x weekly - 3 sets - 10 reps  - Supine March  - 1 x daily - 7 x weekly - 3 sets - 10 reps  - Supine Bridge with Mini Swiss Ball Between Knees  - 1 x daily - 7 x weekly - 3 sets - 10 reps  - Seated Abdominal Press into Swiss Ball  - 1 x daily - 7 x weekly - 3 sets - 10 reps  - Standing Bilateral Low Shoulder Row with Anchored Resistance  - 1 x daily - 7 x weekly - 3 sets - 10 reps  - Seated Cervical Retraction  - 1 x daily - 7 x weekly - 3 sets - 10 reps

## 2024-04-24 ENCOUNTER — TELEPHONE (OUTPATIENT)
Dept: ADMINISTRATIVE | Facility: OTHER | Age: 84
End: 2024-04-24

## 2024-04-24 NOTE — TELEPHONE ENCOUNTER
04/24/24 3:28 PM    Patient contacted (left message) to bring Advance Directive, POLST, or Living Will document to next scheduled pcp visit.    Thank you.  Vika Workman MA  [facility]

## 2024-04-25 ENCOUNTER — OFFICE VISIT (OUTPATIENT)
Facility: CLINIC | Age: 84
End: 2024-04-25
Payer: MEDICARE

## 2024-04-25 DIAGNOSIS — G89.29 CHRONIC MIDLINE LOW BACK PAIN WITHOUT SCIATICA: Primary | ICD-10-CM

## 2024-04-25 DIAGNOSIS — M54.50 CHRONIC MIDLINE LOW BACK PAIN WITHOUT SCIATICA: Primary | ICD-10-CM

## 2024-04-25 DIAGNOSIS — R26.89 OTHER ABNORMALITIES OF GAIT AND MOBILITY: ICD-10-CM

## 2024-04-25 DIAGNOSIS — R26.89 BALANCE DISORDER: ICD-10-CM

## 2024-04-25 PROCEDURE — 97112 NEUROMUSCULAR REEDUCATION: CPT

## 2024-04-25 PROCEDURE — 97530 THERAPEUTIC ACTIVITIES: CPT

## 2024-04-25 NOTE — PROGRESS NOTES
Daily Note     Today's date: 2024  Patient name: Rosa Kapoor  : 1940  MRN: [id number]  Referring provider: Emma Napier DO  Dx:   Encounter Diagnosis     ICD-10-CM    1. Chronic midline low back pain without sciatica  M54.50     G89.29       2. Other abnormalities of gait and mobility  R26.89       3. Balance disorder  R26.89               Subjective: Patient present to PT with stating she has been participating with Ortho for her back pain, stating it has been helping. She reports she has been having increased left shoulder pain. Patient states she would like to continue to participate with balance center in conjunction with ortho PT.     Objective: Patient reports to PT session with no new issues or complaints.   Precautions: 2019 imaging: grade 1 anterolisthesis L4 on L5, and L5 on S1.  Severe central canal narrowing at L4-5 and L5- S1. 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  Disc bulges and facet arthropathy, foraminal narrowing. L3-4 disk osteophyte complex. See report.     TA:  Positionals:  Roll tests: negative, no nystagmus in room light  DixHallpikes: negative no nystagmus.  With return to sit woozy and nauseas as her head is lifted, both sides.     NMR:    - Fredrick's exercise:  symptoms with moving to upright positions only.  2 reps completed. Added to HEP.     Trial of chair yoga: bilaterally  - cat/cow seated  - gentle twist  - HS stretch  -pigeon seated    Standing with chair support 1-2 UE 20 sec each   Warrior I  Warrior II  Warriro III  Tree pose-kickstand method    Assessment: Patient tolerated treatment well today with focus on trial of chair yoga.  When pt reported that she gets dizzy with bending over, positionals were checked, and she is motion sensitive with return to upright from Hallpikes. Fredrick's habituation was instructed. She will perform once daily (lumbar/neck precaustions) and return in one week. At that time she will also report her response  to chair yoga trial today. She required short times and UE support to prevent back pain and loss of balance.    Patient will continue to benefit from skilled OPPT services to reduce overall risk for falls and maximize function. Discharge is on hold at this time, to assess response, and to progress patient to chair yoga at the gym.     Plan: Continue per plan of care.   Discharge is on hold at this time, to assess response, and to progress patient to chair yoga at the gym.     POC is to 6/3/2024    HEP.    -Vor x 1  -added chair poses 11/14/23  -Alcala's habituation 4/25/24 see copy in media.       Outcome Measures Initial Eval  9/27/23 PN  11/2/23 Re-eval  12/7/23 PN  2/6/24 PN  3/11/2024    5xSTS 20.56 sec 14.5 s 12.70 sec 15.42 sec  11.71 sec    TUG  - Regular  - Cognitive   23.38 sec  50.54 sec   -12.31  -18    46% dual task cost   13.94 sec  19.10 sec    37% dual task cost   12.08 sec  -15.23 with many math errors ( by 3's  26% cost    10.37 sec  11.44 sec     10% dual task cost    10 meter defer 12.06 sec 14.86 sec  0.67 m/s 11.77 sec   0.84 m/sec 0.73 m/s    FGA 11/30 20/30 19/30 6.58 sec  22/30  Increased fall risk   22/30    mCTSIB  - FTEO (firm)  - FTEC (firm)  - FTEO (foam)  - FTEC (foam)   30 sec  30 sec  30 sec  4.75 sec     NT           -30 sec EC foam    NT    6MWT defer NT   600 feet  PO2 down to 94%  Pt needed sit rest due to  ft (limited by LBP)    Bales   46/56  53/56 NA        PO2  96  HR 75 bpm                 Precautions:   Past Medical History:   Diagnosis Date    Arthritis     osteo    Autoimmune hepatitis (HCC)     Back pain with radiation     to bilat legs    De Santiago esophagus     De Santiago's esophagus     Chronic GERD     Chronic uveitis     bilateral    CPAP (continuous positive airway pressure) dependence     Diabetes mellitus (HCC)     diet controlled    Disease of thyroid gland     hypo    Diverticulitis     Fibromyalgia syndrome     Fibromyalgia, primary     Hiatal hernia      Hx of colonoscopy 2016    EGD done simultaneously    Hyperlipidemia     Hypertension     Infectious viral hepatitis     PONV (postoperative nausea and vomiting)     Sinus congestion     Sleep apnea     CPAP rx, does not use mask

## 2024-04-26 ENCOUNTER — OFFICE VISIT (OUTPATIENT)
Dept: FAMILY MEDICINE CLINIC | Facility: CLINIC | Age: 84
End: 2024-04-26
Payer: MEDICARE

## 2024-04-26 VITALS
HEIGHT: 56 IN | DIASTOLIC BLOOD PRESSURE: 70 MMHG | BODY MASS INDEX: 32.66 KG/M2 | WEIGHT: 145.2 LBS | SYSTOLIC BLOOD PRESSURE: 122 MMHG | HEART RATE: 69 BPM | RESPIRATION RATE: 18 BRPM | TEMPERATURE: 97.3 F

## 2024-04-26 DIAGNOSIS — E78.2 MIXED HYPERLIPIDEMIA: ICD-10-CM

## 2024-04-26 DIAGNOSIS — E03.9 ACQUIRED HYPOTHYROIDISM: ICD-10-CM

## 2024-04-26 DIAGNOSIS — I10 ESSENTIAL HYPERTENSION: Primary | ICD-10-CM

## 2024-04-26 PROCEDURE — G2211 COMPLEX E/M VISIT ADD ON: HCPCS | Performed by: FAMILY MEDICINE

## 2024-04-26 PROCEDURE — 99214 OFFICE O/P EST MOD 30 MIN: CPT | Performed by: FAMILY MEDICINE

## 2024-04-26 NOTE — PROGRESS NOTES
Name: Rosa Kapoor      : 1940      MRN: [id number]  Encounter Provider: Emma Napier DO  Encounter Date: 2024   Encounter department: [facility]    Assessment & Plan     1. Essential hypertension  Assessment & Plan:  Well controlled     Orders:  -     CBC; Future; Expected date: 10/08/2024  -     Comprehensive metabolic panel; Future; Expected date: 10/08/2024  -     Lipid Panel with Direct LDL reflex; Future; Expected date: 10/08/2024    2. Mixed hyperlipidemia  Assessment & Plan:  She is having some body aches and is going to take a 2 week break from her atorvastatin  If she feels better she will let me know and then will consider switching to a different medication.     Orders:  -     Lipid Panel with Direct LDL reflex; Future; Expected date: 10/08/2024    3. Acquired hypothyroidism  Assessment & Plan:  Well controlled   Continue levothyroxine 75 mcg a day    Orders:  -     T4, free; Future; Expected date: 10/08/2024  -     TSH, 3rd generation; Future; Expected date: 10/08/2024     Return in about 6 months (around 10/26/2024) for Next scheduled follow up.    Subjective      She is almost done with physical therapy.  She has not been having any problems with dizziness.  She has been trying to watch her diet.  She would like to lose a few more pounds.    She is concerned that her aches and pains are related to her atorvastatin.      Review of Systems    Current Outpatient Medications on File Prior to Visit   Medication Sig   • acetaminophen (TYLENOL) 325 mg tablet Take 650 mg by mouth every 6 (six) hours as needed for mild pain.   • amLODIPine (NORVASC) 5 mg tablet TAKE ONE TABLET BY MOUTH EVERY MORNING   • atorvastatin (LIPITOR) 10 mg tablet TAKE ONE TABLET BY MOUTH EVERY DAY   • carvedilol (COREG) 12.5 mg tablet Take 1 tablet (12.5 mg total) by mouth 2 (two) times a day with meals   • carvedilol (COREG) 3.125 mg tablet Take 3.125 mg by mouth 2 (two) times a day with meals   •  "famotidine (PEPCID) 20 mg tablet Take 1 tablet (20 mg total) by mouth 2 (two) times a day   • gabapentin (NEURONTIN) 100 mg capsule TAKE ONE CAPSULE BY MOUTH DAILY AT BED TIME (GENERIC FOR NEURONTIN)   • levothyroxine 75 mcg tablet TAKE ONE TABLET BY MOUTH IN THE MORNING   • lisinopril (ZESTRIL) 40 mg tablet TAKE ONE TABLET BY MOUTH EVERY DAY   • polyethylene glycol (MIRALAX) 17 g packet Take 17 g by mouth every other day       Objective     /70   Pulse 69   Temp (!) 97.3 °F (36.3 °C)   Resp 18   Ht 4' 7.75\" (1.416 m)   Wt 65.9 kg (145 lb 3.2 oz)   BMI 32.85 kg/m²     Physical Exam  Vitals and nursing note reviewed.   Constitutional:       Appearance: She is well-developed.   HENT:      Head: Normocephalic and atraumatic.      Right Ear: Tympanic membrane and external ear normal.      Left Ear: Tympanic membrane and external ear normal.   Cardiovascular:      Rate and Rhythm: Normal rate and regular rhythm.      Heart sounds: Normal heart sounds. No murmur heard.     No friction rub.   Pulmonary:      Effort: Pulmonary effort is normal. No respiratory distress.      Breath sounds: Normal breath sounds. No wheezing or rales.   Musculoskeletal:      Right lower leg: No edema.      Left lower leg: No edema.       Emma Napier, DO    "

## 2024-04-26 NOTE — ASSESSMENT & PLAN NOTE
She is having some body aches and is going to take a 2 week break from her atorvastatin  If she feels better she will let me know and then will consider switching to a different medication.

## 2024-04-30 ENCOUNTER — OFFICE VISIT (OUTPATIENT)
Dept: PHYSICAL THERAPY | Facility: CLINIC | Age: 84
End: 2024-04-30
Payer: MEDICARE

## 2024-04-30 DIAGNOSIS — R26.89 OTHER ABNORMALITIES OF GAIT AND MOBILITY: ICD-10-CM

## 2024-04-30 DIAGNOSIS — M54.50 CHRONIC MIDLINE LOW BACK PAIN WITHOUT SCIATICA: Primary | ICD-10-CM

## 2024-04-30 DIAGNOSIS — G89.29 CHRONIC MIDLINE LOW BACK PAIN WITHOUT SCIATICA: Primary | ICD-10-CM

## 2024-04-30 PROCEDURE — 97112 NEUROMUSCULAR REEDUCATION: CPT | Performed by: PHYSICAL THERAPIST

## 2024-04-30 PROCEDURE — 97110 THERAPEUTIC EXERCISES: CPT | Performed by: PHYSICAL THERAPIST

## 2024-04-30 NOTE — PROGRESS NOTES
"        Daily Note         Today's date: 2024  Patient name: Rosa Kapoor  : 1940  MRN: [id number]  Referring provider: Emma Napier DO  Dx:   Encounter Diagnosis     ICD-10-CM    1. Chronic midline low back pain without sciatica  M54.50     G89.29       2. Other abnormalities of gait and mobility  R26.89           Subjective: Rosa Kapoor reports she was thinking this will be her last week her \"until I see what's going on\" due to using arms cause her neck, shoulder blade pain.         Objective: See treatment diary below    Assessment:   patient deferred UE activities this session for fear of cervical symptoms . Held levator scap stretches manually due to patient felt nauseous with supine stretches.    Plan:  patient to follow up with primary PT next session to discuss PLOC.             Precautions: standard  POC expires Auth Status Total   Visits  Start date  Expiration date PT/OT + Visit Limit? Co-Insurance    MEDICARE                                                  Date (Visit #) 3/26 (2) (3) 3/28 (4)  (5) 23 (6) PN 24 (7) 24 (8)   DTM and TPR          Lumbar mobs          Lumbar Traction    Left upper trap stretch , levator scap stretch 10 sec x 5                  Exercise Diary               Ther Ex          Active w/u              HS/glute/piri/HF stretching  x5-6 mins CP  performed   performed Performed  X7 mins CP X4 mins Performed    Mobility (LTR, open books, cat/cow) 2x15 LTR  15 x 2  15 x 2 LTR: 15 x 2  3x15 total x20 20 x    Rows/ext Red 2x10-12 each Red 2x10-12 each Red 2x10 ea Red Next visit  Red x10 only No  W's BW x5 - deferred  ---   Hip stability          Cerv retraction X10 supine 10 x 10x  rev     GTB rows           Thoracolumbar ROM  Upper quarter stretching w/ cerv distraction x 6 mins  Upper quarter stretching w/ cerv distraction x 6 mins  Upper quarter stretching w/ cerv distraction x 6 mins  Left upper trap stretch , levator scap stretch 10 sec x 5  " rev X10 mins Held levator scap stretch due to nausea                       Neuro Re Ed          TrA progressions   isos w/ ad sq 2x10  TrA single leg ext 2x10  TA brace: 5 sec x 10   + march:10 x 2  + hip add iso: 5 sec x 10    + single leg ext:10 x 2       TrA isos x10  March x10  Single leg ext x10-15    Double leg march to 90/90 2x6 Isos x10  TA iso hold: 5 sec 10 x  March: 10 x each  Single leg ext:  10-15 x   Bridge progressions  2x10 ad sq      bridge: standard: 8 x  + hip add iso: 10 x   + hip abd iso:red 8 x  Reg x20  Standard: 20 x   Standing pball press  rev      standing TA ball vs table: 5 sec x 10    + march:10 x 2 orlin   + hip abd: 10 x 2 orlin   + hip ext: 10 x 2 Reg x10    March 2x10      standing TA ball vs table: 5 sec x 10    + march:10 x orlin   + hip abd: 10 x  orlin   + hip ext: 10 x 2   Hip abd progressions         Side step light green loop 10'x10 Seated scap sq w/ cerv retraction 2x10    pallof Red x 10 to L, x5 to R (pain in L shoulder)   Pallof press  RTB  2x10 B/L Red band close to body: lateral stepping (3) 10 x each side  Pallof red 2x10 no    Horiz abd w/ scap sq Green 2x10 supine  Yellow sitting x20 Yellow sitting: unable due to left shldr pain  Yellow band   2x10 hold no Wall ball cw/ccw x10 - deferred    HEP and POC review  X2-3 mins     Rev of HEP, POC discussion x 5 mins X5 mins w/ sx discussion and POC rev                                  Ther Act              Stairs              Functional Transfers              Functional Lifting                                                                          Modalities               heat                ice                mechanical                                                3/21 RE:  Access Code: [email or web address]  URL: [email or web address]  Date: 03/21/2024  Prepared by: Sebastian Muniz    Exercises  - Supine Posterior Pelvic Tilt  - 1 x daily - 7 x weekly - 3 sets - 10 reps  - Supine March  - 1 x daily - 7 x weekly - 3 sets -  10 reps  - Supine Bridge with Mini Swiss Ball Between Knees  - 1 x daily - 7 x weekly - 3 sets - 10 reps  - Seated Abdominal Press into Swiss Ball  - 1 x daily - 7 x weekly - 3 sets - 10 reps  - Standing Bilateral Low Shoulder Row with Anchored Resistance  - 1 x daily - 7 x weekly - 3 sets - 10 reps  - Seated Cervical Retraction  - 1 x daily - 7 x weekly - 3 sets - 10 reps

## 2024-05-02 ENCOUNTER — OFFICE VISIT (OUTPATIENT)
Facility: CLINIC | Age: 84
End: 2024-05-02
Payer: MEDICARE

## 2024-05-02 DIAGNOSIS — R26.89 OTHER ABNORMALITIES OF GAIT AND MOBILITY: ICD-10-CM

## 2024-05-02 DIAGNOSIS — R26.89 BALANCE DISORDER: Primary | ICD-10-CM

## 2024-05-02 PROCEDURE — 97530 THERAPEUTIC ACTIVITIES: CPT

## 2024-05-02 PROCEDURE — 97112 NEUROMUSCULAR REEDUCATION: CPT

## 2024-05-02 NOTE — PROGRESS NOTES
Daily Note     Today's date: 2024  Patient name: Rosa Kapoor  : 1940  MRN: [id number]  Referring provider: Emma Napier DO  Dx:   Encounter Diagnosis     ICD-10-CM    1. Balance disorder  R26.89       2. Other abnormalities of gait and mobility  R26.89                 Subjective: Patient present to PT with stating she has been participating with Ortho for her back pain, stating it has been helping. She reports she has been having increased left shoulder pain. Patient states she would like to continue to participate with balance center in conjunction with ortho PT.   Patient states she only performed Alcala's at home once due to the symptoms that were provoked: nausea.  It lasted around 1 hour.      Objective: Patient reports to PT session with no new issues or complaints.   Precautions: 2019 imaging: grade 1 anterolisthesis L4 on L5, and L5 on S1.  Severe central canal narrowing at L4-5 and L5- S1. 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  Disc bulges and facet arthropathy, foraminal narrowing. L3-4 disk osteophyte complex. See report.     TA:  Positionals repeated:  Roll tests: negative, no nystagmus in room light. No symptoms upon return to sit.   DixHallpikes: negative no nystagmus.  With return to sit woozy and nauseas as her head is lifted, both sides.   Motion Sensitivity Quotient: 2.9% Symptoms with supine to sit, DHP to sit bilaterally, head rotations.      NMR:  -VOR x 1 60 sec self paced H: headache but not dizzy  V: 1/5 dizziness, nausea at 45 seconds 2/5 nausea   - Alcala's exercise:  symptoms with moving to upright positions only.  4 reps completed. Brief dizziness (woosy not spinny), nausea upon sitting up. Patient requires verbal cues, tactile cues for correct technique.      Assessment: Patient tolerated treatment well today with focus on continued motion sensitivity evaluation.  BPPV testing is negative in room light. She admits to not doing Alcala's at home  due to nausea.   She admits to not doing VOR x 1 at home, but she still has the letter and the VOR x 1 instructions.She reports her response to chair yoga trial  was good. She had mild trap soreness.   Patient will continue to benefit from skilled OPPT services to reduce overall risk for falls and maximize function. Discharge is on hold at this time, to assess response, and to progress patient to chair yoga at the gym.     Plan: Continue per plan of care.   Discharge is on hold at this time, to assess response, and to progress patient to chair yoga at the gym. Although patient is now stating she may plan to transfer to the [facility] when appropriate.     POC is to 6/3/2024    HEP.    -Vor x 1  -added chair poses 11/14/23  -Alcala's habituation 4/25/24 see copy in media.       Outcome Measures Initial Eval  9/27/23 PN  11/2/23 Re-eval  12/7/23 PN  2/6/24 PN  3/11/2024    5xSTS 20.56 sec 14.5 s 12.70 sec 15.42 sec  11.71 sec    TUG  - Regular  - Cognitive   23.38 sec  50.54 sec   -12.31  -18    46% dual task cost   13.94 sec  19.10 sec    37% dual task cost   12.08 sec  -15.23 with many math errors ( by 3's  26% cost    10.37 sec  11.44 sec     10% dual task cost    10 meter defer 12.06 sec 14.86 sec  0.67 m/s 11.77 sec   0.84 m/sec 0.73 m/s    FGA 11/30 20/30 19/30 6.58 sec  22/30  Increased fall risk   22/30    mCTSIB  - FTEO (firm)  - FTEC (firm)  - FTEO (foam)  - FTEC (foam)   30 sec  30 sec  30 sec  4.75 sec     NT           -30 sec EC foam    NT    6MWT defer NT   600 feet  PO2 down to 94%  Pt needed sit rest due to  ft (limited by LBP)    Bales   46/56  53/56 NA        PO2  96  HR 75 bpm                 Precautions:   Past Medical History:   Diagnosis Date    Arthritis     osteo    Autoimmune hepatitis (HCC)     Back pain with radiation     to bilat legs    De Santiago esophagus     De Santiago's esophagus     Chronic GERD     Chronic uveitis     bilateral    CPAP (continuous positive airway  pressure) dependence     Diabetes mellitus (HCC)     diet controlled    Disease of thyroid gland     hypo    Diverticulitis     Fibromyalgia syndrome     Fibromyalgia, primary     Hiatal hernia     Hx of colonoscopy 2016    EGD done simultaneously    Hyperlipidemia     Hypertension     Infectious viral hepatitis     PONV (postoperative nausea and vomiting)     Sinus congestion     Sleep apnea     CPAP rx, does not use mask

## 2024-05-06 DIAGNOSIS — E03.9 ACQUIRED HYPOTHYROIDISM: ICD-10-CM

## 2024-05-06 RX ORDER — LEVOTHYROXINE SODIUM 0.07 MG/1
75 TABLET ORAL EVERY MORNING
Qty: 90 TABLET | Refills: 1 | Status: SHIPPED | OUTPATIENT
Start: 2024-05-06

## 2024-05-07 ENCOUNTER — OFFICE VISIT (OUTPATIENT)
Dept: PHYSICAL THERAPY | Facility: CLINIC | Age: 84
End: 2024-05-07
Payer: MEDICARE

## 2024-05-07 DIAGNOSIS — M54.50 CHRONIC MIDLINE LOW BACK PAIN WITHOUT SCIATICA: Primary | ICD-10-CM

## 2024-05-07 DIAGNOSIS — G89.29 CHRONIC MIDLINE LOW BACK PAIN WITHOUT SCIATICA: Primary | ICD-10-CM

## 2024-05-07 PROCEDURE — 97112 NEUROMUSCULAR REEDUCATION: CPT

## 2024-05-07 PROCEDURE — 97110 THERAPEUTIC EXERCISES: CPT

## 2024-05-07 NOTE — PROGRESS NOTES
D/C Note     Today's date: 2024  Patient name: Rosa Kapoor  : 1940  MRN: [id number]  Referring provider: Emma Napier DO  Dx:   Encounter Diagnosis     ICD-10-CM    1. Chronic midline low back pain without sciatica  M54.50     G89.29           Start Time: 1418  Stop Time: 1456  Total time in clinic (min): 38 minutes    Subjective: Pt reports that today will be last day. Feels ortho PT has helped but that she can continue on own, or transfer to clinic closer to home.       Short term goals (3 weeks): ALL PROGRESSED  1) Pt will improve thoracolumbar mobility deficits by 25% pain free.  2) Pt will improve B LE and core strength deficits by 1/3 grade MMT.  3) Pt will improve pain at worse to <4/10.   4) Pt will initiate and progress HEP w/ special emphasis on functional core control and thoracolumbar mobility.    Long term goals (6 weeks) ALL PROGRESSED  1) Pt will stand and ambulate for community distances w/o limitation due to low back.   2) Pt will sleep through the night in positioning of choosing 6/7 nights a week w/o waking due to pain.  3) Pt will perform all household activities for two full weeks w/o modification due to low back pain.  4) Pt will be independent and compliant w/ HEP in order to maximize functional benefit of skilled PT following d/c.      *all goals extended by 2 and 4 weeks, respectively     : All goals progressed or achieved at time of d/c    Objective: functional status largely similar to previous re-assessment       Assessment: Tolerated treatment well. Patient demonstrated fatigue post treatment and would benefit from continued PT. Reviewed very gentle HEP for time between clinics. Will communicate w/ [place] once pt knows who she is set to see. She will be back for neuro wrap up next week, will determine next best steps at that time. Pt is welcome back at our clinic at any time.   .     Plan: Continue per plan of care. Possibly in [place]       Precautions:  standard  POC expires Auth Status Total   Visits  Start date  Expiration date PT/OT + Visit Limit? Co-Insurance    MEDICARE                                                  Date (Visit #) 3/26 (2) (3) 3/28 (4) 4/2 (5) 4/9/23 4/16/24 (6) PN 4/23/24 (7) 4/30/24 (8) 5/7/24 (9)   DTM and TPR           Lumbar mobs           Lumbar Traction    Left upper trap stretch , levator scap stretch 10 sec x 5                    Exercise Diary                Ther Ex           Active w/u               HS/glute/piri/HF stretching  x5-6 mins CP  performed   performed Performed  X7 mins CP X4 mins Performed  CP x 8 mins   Mobility (LTR, open books, cat/cow) 2x15 LTR  15 x 2  15 x 2 LTR: 15 x 2  3x15 total x20 20 x  X20 total   Rows/ext Red 2x10-12 each Red 2x10-12 each Red 2x10 ea Red Next visit  Red x10 only No  W's BW x5 - deferred  ---    Hip stability           Cerv retraction X10 supine 10 x 10x  rev      GTB rows            Thoracolumbar ROM  Upper quarter stretching w/ cerv distraction x 6 mins  Upper quarter stretching w/ cerv distraction x 6 mins  Upper quarter stretching w/ cerv distraction x 6 mins  Left upper trap stretch , levator scap stretch 10 sec x 5  rev X10 mins Held levator scap stretch due to nausea                          Neuro Re Ed           TrA progressions   isos w/ ad sq 2x10  TrA single leg ext 2x10  TA brace: 5 sec x 10   + march:10 x 2  + hip add iso: 5 sec x 10    + single leg ext:10 x 2       TrA isos x10  March x10  Single leg ext x10-15    Double leg march to 90/90 2x6 Isos x10  TA iso hold: 5 sec 10 x  March: 10 x each  Single leg ext:  10-15 x TrA iso and march x20    Single leg ext x20 each   Bridge progressions  2x10 ad sq      bridge: standard: 8 x  + hip add iso: 10 x   + hip abd iso:red 8 x  Reg x20  Standard: 20 x X20 bridge w/ ad sq    X20 ad sq   Standing pball press  rev      standing TA ball vs table: 5 sec x 10    + march:10 x 2 orlin   + hip abd: 10 x 2 orlin   + hip ext: 10 x 2 Reg  x10    March 2x10      standing TA ball vs table: 5 sec x 10    + march:10 x orlin   + hip abd: 10 x  orlin   + hip ext: 10 x 2 Standing pball press x10, w/ hip abd x 20 each   Hip abd progressions         Side step light green loop 10'x10 Seated scap sq w/ cerv retraction 2x10     pallof Red x 10 to L, x5 to R (pain in L shoulder)   Pallof press  RTB  2x10 B/L Red band close to body: lateral stepping (3) 10 x each side  Pallof red 2x10 no     Horiz abd w/ scap sq Green 2x10 supine  Yellow sitting x20 Yellow sitting: unable due to left shldr pain  Yellow band   2x10 hold no Wall ball cw/ccw x10 - deferred     HEP and POC review  X2-3 mins     Rev of HEP, POC discussion x 5 mins X5 mins w/ sx discussion and POC rev  X5 min final HEP rev                                    Ther Act               Stairs               Functional Transfers               Functional Lifting                                                                               Modalities                heat                 ice                 mechanical                                                   3/21 RE:  Access Code: [email or web address]  URL: [email or web address]  Date: 03/21/2024  Prepared by: Sebastian Muniz    Exercises  - Supine Posterior Pelvic Tilt  - 1 x daily - 7 x weekly - 3 sets - 10 reps  - Supine March  - 1 x daily - 7 x weekly - 3 sets - 10 reps  - Supine Bridge with Mini Swiss Ball Between Knees  - 1 x daily - 7 x weekly - 3 sets - 10 reps  - Seated Abdominal Press into Swiss Ball  - 1 x daily - 7 x weekly - 3 sets - 10 reps  - Standing Bilateral Low Shoulder Row with Anchored Resistance  - 1 x daily - 7 x weekly - 3 sets - 10 reps  - Seated Cervical Retraction  - 1 x daily - 7 x weekly - 3 sets - 10 reps

## 2024-05-14 ENCOUNTER — APPOINTMENT (OUTPATIENT)
Dept: PHYSICAL THERAPY | Facility: CLINIC | Age: 84
End: 2024-05-14
Payer: MEDICARE

## 2024-05-15 NOTE — PROGRESS NOTES
Progress Note     Today's date: 5/15/2024  Patient name: Rosa Kapoor  : 1940  MRN: [id number]  Referring provider: Emma Napier DO  Dx:   No diagnosis found.                 Subjective: Pt reports that low back pain is about 3-4/10 today. Notes that she feels PT is helpful, finds it hard to do exercises on own. Notes that pain in morning is slightly better, was getting up to 8/10. No worse than 6/10 today. Feels that she is more agile, better able to move w/ less overall pain. Would like to continue w/ skilled PT. Functional update below:      Current pain: 3-4/10  At best: 0/10   At worst: 6/10 - L side low back in morning    Short term goals (3 weeks): ALL PROGRESSED  1) Pt will improve thoracolumbar mobility deficits by 25% pain free.  2) Pt will improve B LE and core strength deficits by 1/3 grade MMT.  3) Pt will improve pain at worse to <4/10.   4) Pt will initiate and progress HEP w/ special emphasis on functional core control and thoracolumbar mobility.    Long term goals (6 weeks) ALL PROGRESSED  1) Pt will stand and ambulate for community distances w/o limitation due to low back.   2) Pt will sleep through the night in positioning of choosing 6/7 nights a week w/o waking due to pain.  3) Pt will perform all household activities for two full weeks w/o modification due to low back pain.  4) Pt will be independent and compliant w/ HEP in order to maximize functional benefit of skilled PT following d/c.      *all goals extended by 2 and 4 weeks, respectively     Objective:   Thoracolumbar   Flex: 50% stiff w/ min pain  Ext: 25% w/ min pain   SB R: 33%   SB L: 33% w/ pain in L side   Rot R: 50% w/ min pain in R side low back  Rot L: 50% w/ min pain in R side low back    :  Thoracolumbar  Flex: 75%  Ext: 50% no pain  SB R: 50%   SB L: 50%  Rot R: 60%   Rot L: 60%     Hip strength:  Grossly 4-/5 throughout    -: grossly 4-/5 throughout    Core: no greater than 1+/5 w/ heavy cueing required for  form and pacing.   4/16: at least 2/5 w/ quick fatigue     Repeated motions not tested today   -4/16: tolerates extension more fully, repeated motions not done    Ambulates w/ forward flexed posture, decreased step length L>R, min antalgic    -4/16: improved upright posture w/ better pacing. Equal step length and speed, quick fatigue        Assessment: Tolerated treatment well. Patient demonstrated fatigue post treatment and would benefit from continued PT. Does well w/ exercise progressions today. She is making good objective progress in strength, functional spine ROM, and quality of motion. Pain is slightly higher at rest, but better on average and at worst. She would like to continue w/ ortho/neuro split, no new complaints to finish session. Plan to continue for next 4 weeks, re-assess and progress accordingly. Pt in agreement w/ plan.       Plan: Continue per plan of care. Loaded strength work as able, gentle carries, hinge vs squat, single leg strength work       Precautions: standard  POC expires Auth Status Total   Visits  Start date  Expiration date PT/OT + Visit Limit? Co-Insurance    MEDICARE                                                  Date (Visit #) RE 3/21 (1) 3/26 (2) (3) 3/28 (4) 4/2 (5) 4/9/23 4/16/24 (6) PN   DTM and TPR         Lumbar mobs         Lumbar Traction Trialed     Left upper trap stretch , levator scap stretch 10 sec x 5               Exercise Diary               Ther Ex         Active w/u              HS/glute/piri/HF stretching  tested x 3-4 mins   x5-6 mins CP  performed   performed Performed  X7 mins CP   Mobility (LTR, open books, cat/cow) Intro  2x15 LTR  15 x 2  15 x 2 LTR: 15 x 2  3x15 total   Rows/ext  Red 2x10-12 each Red 2x10-12 each Red 2x10 ea Red Next visit  Red x10 only   Hip stability         Cerv retraction 2x10 X10 supine 10 x 10x  rev   GTB rows  2x10        Thoracolumbar ROM  Tested x 3-4 mins Upper quarter stretching w/ cerv distraction x 6 mins  Upper quarter  stretching w/ cerv distraction x 6 mins  Upper quarter stretching w/ cerv distraction x 6 mins  Left upper trap stretch , levator scap stretch 10 sec x 5  rev                     Neuro Re Ed         TrA progressions   isos, march x20 each  isos w/ ad sq 2x10  TrA single leg ext 2x10  TA brace: 5 sec x 10   + march:10 x 2  + hip add iso: 5 sec x 10    + single leg ext:10 x 2       TrA isos x10  March x10  Single leg ext x10-15    Double leg march to 90/90 2x6   Bridge progressions W/ ad sq 2x10   2x10 ad sq      bridge: standard: 8 x  + hip add iso: 10 x   + hip abd iso:red 8 x  Reg x20   Standing pball press 2x10 into table    W/ hip abd 2x10   rev      standing TA ball vs table: 5 sec x 10    + march:10 x 2 orlin   + hip abd: 10 x 2 orlin   + hip ext: 10 x 2 Reg x10    March 2x10       Hip abd progressions           Side step light green loop 10'x10   pallof  Red x 10 to L, x5 to R (pain in L shoulder)   Pallof press  RTB  2x10 B/L Red band close to body: lateral stepping (3) 10 x each side  Pallof red 2x10   Horiz abd w/ scap sq  Green 2x10 supine  Yellow sitting x20 Yellow sitting: unable due to left shldr pain  Yellow band   2x10 hold no   HEP and POC review  X15 min w/ re-assessment X2-3 mins     Rev of HEP, POC discussion x 5 mins                              Ther Act              Stairs              Functional Transfers              Functional Lifting                                                                          Modalities               heat                ice                mechanical                                                3/21 RE:  Access Code: [email or web address]  URL: [email or web address]  Date: 03/21/2024  Prepared by: Sebastian Muniz    Exercises  - Supine Posterior Pelvic Tilt  - 1 x daily - 7 x weekly - 3 sets - 10 reps  - Supine March  - 1 x daily - 7 x weekly - 3 sets - 10 reps  - Supine Bridge with Mini Swiss Ball Between Knees  - 1 x daily - 7 x weekly - 3 sets - 10 reps  -  Seated Abdominal Press into Swiss Ball  - 1 x daily - 7 x weekly - 3 sets - 10 reps  - Standing Bilateral Low Shoulder Row with Anchored Resistance  - 1 x daily - 7 x weekly - 3 sets - 10 reps  - Seated Cervical Retraction  - 1 x daily - 7 x weekly - 3 sets - 10 reps

## 2024-05-16 ENCOUNTER — OFFICE VISIT (OUTPATIENT)
Facility: CLINIC | Age: 84
End: 2024-05-16
Payer: MEDICARE

## 2024-05-16 DIAGNOSIS — R26.89 BALANCE DISORDER: Primary | ICD-10-CM

## 2024-05-16 PROCEDURE — 97530 THERAPEUTIC ACTIVITIES: CPT

## 2024-05-16 PROCEDURE — 97112 NEUROMUSCULAR REEDUCATION: CPT

## 2024-05-16 NOTE — PROGRESS NOTES
PT Re-Evaluation /PROGRESS NOTE         POC expires Auth Status Total   Visits  Start date  Expiration date PT/OT + Visit Limit? Co-Insurance   24 N/A N/A N/A N/A PT, MAJO No and $0 Co-pay                                                  Today's date: 2024  Patient name: Rosa Kapoor  : 1940  MRN: [id number]  Referring provider: Emma Napier DO  Dx:   Encounter Diagnosis     ICD-10-CM    1. Balance disorder  R26.89                 Assessment    Updated 24:   Rosa, an 84 year old female, has been attending PT  for imbalance since 2023.  Her progress was delayed by illness causing her to miss a month of PT. Her progress was also delayed by back pain, and she was referred to orthopedic PT. She states today that she is able to walk from her clubhouse back to her apartment, which she was previously unable to do.  Sometime she needs to stop and rest due to pain, but sometimes she completes the walk without stopping.  Objective measures today reveal she is not at increased risk for falls per her FGA score. She shows mild regression of 5XSTS and  TUG, but her TUG cog required no cost for the cognitive dual tasking.  Her 6MWT  score of 680 feet is the best to date, but remains below the age-gender norm.  She continues to display and report motion sensitivity and has been advised to continue Alcala's exercises at home, which provokes mild nausea that is improving.  Her DixHallpikes/positionals were negative as of 24. She had [facility], but she and her spouse have decided that they prefer to attend an SLPT clinic that is closer to their home.    Patient has met 10 of her long and short term goals.  Advise DC from PT at this time. Rosa was provided education regarding when to return to PT, such as noting weakness during transfers and walking slower. She verbalized  understanding. She was provided a final written home exercise program.     Final HEP:   Access Code: [email or web address]  URL: [email or web address]  Date: 05/16/2024  Prepared by: Maria Luisa Jose  Exercises  - Sit to Stand  - 2 x daily - 7 x weekly - 2 sets - 10 reps  - Walking with Head Rotation  - 1 x daily - 7 x weekly - 3 sets - 10 reps  - Standing Posterior Pelvic Tilt  - 1 x daily - 7 x weekly - 3 sets - 10 reps    Update 4/11/24: Patient is a 83 y.o. Female who presents to skilled outpatient PT with imbalance, dizziness and difficulty with stair negotiation affecting her functional indpendence and ability to participate in her community . Patient's past medical history significant for falls, controlled HTN, and substantial surgical history. Overall patient reports that she has felt improvements with her gait and balance but continues to be bothered by low back pain. She is also currently being treated by orthopedic therapy for her low back pain. Since her most recent progress note patient maintains similar values for most of her objective measures including 5xSTS, 6 MWT, 10 MWT, and FGA. However her improved score of 23/30 on FGA puts her above increased risk of falls value. She was most challenged today with her TUG, requiring significant increased time to complete similar to previously tested values. Based on her overall plateau in progress, plan to continue with 2 additional sessions to provide patient with HEP prior to discharge. Also plan to have patient evaluated for possible transition to chair yoga class to maintain gains previously made and maximize patient's level of function and independence. Discussed plan with patient who is in agreement.     Updated 3/11/2024: Patient reports for reassessment with deficits in strength, balance, and mobility. At this time, patient feels her greatest limitations include LBP and decreased community mobility as a result. Patient demonstrates overall improvements  in the following outcome measures since last reassessment: 5 x STS and TUG (regular and cognitive), likely indicating overall gains in functional LE strength, safe functional mobility, and dual tasking ability. Patient has otherwise demonstrated general plateau in ambulation speed and 6 MWT. Patient limited in 6 MWT in large part due to onset of LBP with prolonged ambulation. Per cutoff scores for the FGA she continues to be classified as borderline risk for falls. However, based on patient's performance for TUG cognitive, she demonstrates a 10% difference which puts her at low risk for falls and demonstrates improvement in dual tasking ability compared with previous assessments. Due to patient's persistent LBP in addition to primary goal being to improve ability to ambulate community distances, discussed with patient the benefit or ortho PT reassessment. Plan is for patient to transition to ortho PT services in next 1-2 weeks in order to address deficits most relevant to her functional limitations, with patient in agreement at this time. She has met one additional short term goal at this time.      Updated Progress Note 2/6/24:  Restart of PT after extended break due to unknown viral illness requiring several weeks to recover.  Patient returns after absence since 12/14/23.   Objective measures indicated that her gait speed over 10 M does not meet age norm, but qualifies her as a community ambulator.  During 6 MWT she had to rest during the test due to LBP.  Her score of 600 feet is well below age norm of 1286 feet.  Bales Score of 53/56 indicates no increased risk for falls.  She displays only slowing during gait with head turns, not veering. FGA score of 22/30 indicates increased fall risk.  5XSTS score has declined since pt was last here, to 15.42 sec, and is slower than age norm.  TUG regular is within safety range, but there was a 26% cost for TUG cog, with multiple math errors counting backwards by 3's. Patient  "would benefit from continued PT with focus on posterior pelvic tilting during walking/standing/function to prevent onset of LBP that is likely due to stenosis. Focus also on mental dual tasking for improved safety in home and community.  Advise continued PT per POC.  Patient is in agreement with plan.        UPDATED 12/7/23: Rosa had VNG this afternoon and had no response to calorics which could indicate bilateral hypofunction. Otherwise no abnormal findings from VNG. She demonstrates improvements in 5x STS and is now deemed low fall risk. She remains high fall risk per TUG and TUG cog, however dual task cost is decreasing. She also remains high fall risk per FGA and gait speed. Gait speed indicates limited community ambulator. She continues to veer with head turns during gait. She tested + for orthostatic hypotension last visit and we reached out to PCP regarding it. Her chief complaint at this time is imbalance more than the dizziness. Discussed plan moving forward to focus on dynamic balance and gait for the next month, and then re-assess for improvements. Pt in agreement with this plan.     UPDATED 11/2/23: Rosa has attended PT for the past month for the diagnoses above. She reports feeling more agile since starting PT. She continues to report dizziness and has tested (+) for orthostatic hypotension, and displays significant motion sensitivity.    Objective measures:  5XSTS improved from 20 to 14.5 seconds improved by more than the MDC 2.3 sec.  (14.8 sec for age/gender norm.)  TUG improved from 23 to 12 seconds, more than 4.4 MDC and better than 13.5 safety cutoff.   TUG cog improved from 50 to 18 seconds.  FGA improved from 11 to 20/30. She remains high fall risk. She continues to demonstrate significant veering during gait with horizontal head turns. She must slow prior to stepping over a 6\" obstacles.   Bales score 46/56 indicates she remains high fall risk.      Goal completion:  Patient has met several  STG " goals  and 1 LTG with progress towards additional goals, at this time.     Recommendations:    Advise continued PT per POC, to continue vestibular therapy with habituation, and dynamic gait/balance training to improve mobility to safe level in the community.   Rosa is in aggreement with this plan.   Plan for 6MWT next visit.     Initial PT Assessment details: Patient is a 84 y.o. Female who presents to skilled outpatient PT with imbalance, dizziness and difficulty with stair negotiation affecting her functional indpendence and ability to participate in her community . Patient's past medical history significant for falls, controlled HTN, and substantial surgical history. She demonstrates deficits in muscular strength, ambulatory safety, dual tasking, dynamic and static balance as per scores on 5x STS, TUG and TUG cog, FGA, and mCTSIB respectively. As per APTA and Rehab Measure cuttoff scores, Rosa categorizes at a HIGH risk for falls with respect to the aforementioned outcome measures. Importantly, patient with occurrence of subjective dizziness symptoms following position changes (supine to stand,) ambulation with head turns, backwards walking and activities where vision was occluded. This could indicate a vestibular system dysfunction, plan to perform oculomotor and cervical screen to assess possible vestibular system involvement, as well as screen for orthostatic hypotension. Patient demonstrates high reliance on visual system for dynamic and static balance activities further perpetuating her risk for falls. Unable to assess gait speed and cardiovascular endurance today due to time constraints, plan to assess in future session to appropriate educate on use of assisted device while ambulating in her community. She will benefit from skilled PT to target balance deficits to improve patient overall function, decrease risk for fall to promote independence and participation in her community.     Impairments: Abnormal  coordination, Abnormal gait, Abnormal muscle tone, Abnormal or restricted ROM, Activity intolerance, Impaired balance, Impaired physical strength, Lacks appropriate HEP, Poor posture, Poor body mechanics, Pain with function, Safety issue, Weight-bearing intolerance, Abnormal movement, Difficulty understanding, Abnormal muscle firing  Understanding of Dx/Px/POC: Good  Prognosis: Good    Patient verbalized understanding of POC.         Please contact me if you have any questions or recommendations. Thank you for the referral and the opportunity to share in Rosa Kapoor's care.        Plan  Plan details: balance training, strengthening, vestibular training  Patient would benefit from: Skilled PT  Planned modality interventions: Biofeedback, Cryotherapy, TENS, Thermotherapy  Planned therapy interventions: Abdominal trunk stabilization, ADL training, Balance, Balance/WB training, Breathing training, Body mechanics training, Coordination, Functional ROM exercises, Gait training, HEP, Joint Mobilization, Manual Therapy, Felix taping, Motor coordination training, Neuromuscular re-education, Patient education, Postural training, Strengthening, Stretching, Therapeutic activities, Therapeutic exercises, Therapeutic training, Transfer training, Activity modification, Work reintegration  Frequency: 2x/wk  Duration in weeks: 12  Plan of Care beginning date: 4/8/24  Plan of Care expiration date: 8 weeks - 6/3/2024  Treatment plan discussed with: Patient and Family  DC PT 5/16/24       Goals  Short Term Goals (4 weeks):    - Patient will improve time on TUG by 2.9 seconds to facilitate improved safety in all ambulation MET   - Patient will be independent in basic HEP 2-3 weeks -MET  - Patient will improve 5xSTS score by 2.3 seconds to promote improved LE functional strength needed for ADLs -MET   - Patient will reduce dual task cost to < 25% to improve balance and ability to dual task. - MET    Long Term Goals (12 weeks):  -  Patient will be independent in a comprehensive home exercise program- MET  - Patient will improve gait speed to 1.0 m/s or greater to improve safety with community ambulation- NOT MET, IMPROVING  - Patient will improve scoring on FGA by 4 points to progress safety with dynamic tasks- MET   - Patient will be able to demonstrate HT in gait without veering-MET   - Patient will report going on walks at least 3 days per week to promote independence and improved cardiovascular endurance- NOT MET  - Patient will report 50% reduction in near falls when ambulating on uneven terrain- Her backyard is uneven/grass. She is cautious but can walk short distances without LOB MET  - Patient will reduce dual task cost to < 10% to improve balance and ability to dual task. -  MET  - Patient will increase FGA score to 25/30 to reduce fall risk. NEARLY  MET      Cut off score    All date taken from APTA Neuro Section or Rehab Measures      Bales56  MDC: 6 pts  Age Norms:  60-69: M - 55   F - 55  70-79: M - 54   F - 53  80-89: M - 53   F - 50 5xSTS: Mallory et al 2010  MDC: 2.3 sec  Age Norms:  60-69: 11.1 sec  70-79: 12.6 sec  80-89: 14.8 sec   TUG  MDC: 4.14 sec  Cut off score:  >13.5 sec community dwelling adults  >32.2 frail elderly  <20 I for basic transfers  >30 dependent on transfers 10 Meter Walk Test: Marquis al 2011  MDC: 0.18 m/s  20-29: M - 1.35 m   F - 1.34 m  30-39: M - 1.43 m   F - 1.34 m  40-49: M - 1.43 m   F - 1.39 m  50-59: M - 1.43 m   F - 1.31 m  60-69: M - 1.34 m   F - 1.24 m  70-79: M - 1.26 m   F - 1.13 m  80-89: M - 0.97 m   F - 0.94 m    Household Ambulator < 0.4 m/s  Limited Community Ambulator 0.4 - 0.8 m/s  Community Ambulator 0.8 - 1.2 m/s  Safely cross the street > 1.2 m/s   FGA  MCID: 4 pts  Geriatrics/community < 22/30 fall risk  Geriatrics/community < 20/30 unexplained falls    DGI  MDC: vestibular - 4 pts  MDC: geriatric/community - 3 pts  Falls risk <19/24 mCTSIB  Norm: 20-60 yrs  Eyes  "open firm: norm sway 0.21-0.48  Eyes closed firm: norm sway 0.48-0.99  Eyes open foam: norm sway 0.38-0.71  Eyes closed foam: norm sway 0.70-2.22   6 Minute Walk Test  MDC: 190.98 ft  MCID: 164 ft    Age Norms  60-69: M - 1876 ft (571.80 m)  F - 1765 ft (537.98 m)  70-79: M - 1729 ft (527.00 m)  F - 1545 ft (470.92 m)  80-89: M - 1368 ft (416.97 m)  F - 1286 ft (391.97 m) ABC: Zaida & Horowitz, 2003  <67% increased risk for falls         Subjective    Updated (3/11/2023): Patient reports that she wants to continue on her community mobility and longer distance ambulating. She reports she continues to feel limited by LBP with increased mobility. She reports that her dizziness has been \"pretty good\" and feels her blood pressure has stabilized, which has helped.    Update 12/7/23: Pt reports she feels like her walking has actually gotten better. She had VNG today and she had no response to calorics which could indicate bilateral hypofunction (no central signs and negative for BPPV). She would like to be able to walk more, but sometimes her back hurts. Wants to be able to walk without \"solange-tottering\" outside in open environments.     Updated 11/2/23: Patient reports feeling more agile since starting PT one month ago.      Initial assessment:History of Present Illness  - Mechanism of injury: Patient is an 83 year old female presenting to PT with complaints of difficulty climbing stairs, imbalance and history of falls. Past medical history is significant for controlled HTN, L hip pain, and substantial surgical history. She reports she's only had one recent fall (August 2023) which occurred because she missed a step, lost her balance anteriorly and landed on her R hand and face. She is currently attending hand therapy for subsequent fracture in R hand. Rosa reports feelings of dizziness described as \"lightheaded and floaty\" that accompanies imbalance especially when waking up in the middle of the night and walking in " the dark. She reports dizziness does not last longer than a couple of seconds and denies any falls proceeded by described dizzines. Patient does not currently ambulate with an AD but her daughter has purchased her a SPC due to her unsteadiness.     - Primary AD: daughter bought her a cane recently, no ad at home or in community.   - Assist level at home: independent  - Decreased fine motor tasks: No      Pain  - Current pain ratin/10  - At best pain ratin/10  - At worst pain ratin-7/10  - Location: L buttock, hip  - Aggravating factors: standing, morning    Social Support  - Steps to enter house: none, 1 garage steps  - Stairs in house: no steps, ranch style house  - Lives in: ranch style house  - Lives with:  and daughter visits occasionally    - Employment status: retired,  for [employer]  - Hand dominance: R handed    Treatments  - Previous treatment: Hand therapy, ortho PT for shoulder   - Current treatment: Hand therapy  - Diagnostic Testing: x-ray from fall of hand.       Objective     LE MMT  - R Hip Flexion: 3+/5  L Hip Flexion: 4-/5  - R Hip Extension: 4/5  L Hip Extension: 4/5  - R Hip Abduction: 4/5  L Hip Abduction: 4/5  - R Hip Adduction: 4-/5  L Hip Adduction: 4+/5  - R Knee Extension: 4/5 (pain in R quad)  L Knee Extension: 5/5  - R Knee Flexion: 5/5  L Knee Flexion: 5/5  - R Ankle DF: 4-/5   L Ankle DF: 4/5  - R Ankle PF: 5/5   L Ankle PF: 5/5    Sensation  - Light touch: normla, B/L numbness at lateral aspects of shins    Coordination  - Heel to Shin: Normal, weakness evident  - Alternate Toe Taps: normal  - Finger to Nose: normal    Myelopathy Screen (>3/5 +)  - Lowry's Reflex: -  - Inverted Supinator Sign: -  - Age > 45: Yes  - Gait Deviation: No      Gait  - Abnormalities: decreased gait speed, decreased trunk rotation, decreased B/L foot clearance         Outcome Measures Initial Eval  23 PN  23 Re-eval  23 PN  24 PN  3/11/2024  PN  4/11/2024 5/16/24   5xSTS 20.56 sec 14.5 s 12.70 sec 15.42 sec  11.71 sec 11.2 sec 13.03 sec    TUG  - Regular  - Cognitive   23.38 sec  50.54 sec   -12.31  -18    46% dual task cost   13.94 sec  19.10 sec    37% dual task cost   12.08 sec  -15.23 with many math errors ( by 3's  26% cost    10.37 sec  11.44 sec     10% dual task cost   12.75 sec  14.88 sec   -13.00 sec   -12.97   10 meter defer 12.06 sec 14.86 sec  0.67 m/s 11.77 sec   0.84 m/sec 0.73 m/s 0.81 m/s 11.75 sec  0.85 m/sec   FGA 11/30 20/30 19/30 6.58 sec  22/30  Increased fall risk   22/30 23/30 24/30    7.20 for 20 feet   mCTSIB  - FTEO (firm)  - FTEC (firm)  - FTEO (foam)  - FTEC (foam)   30 sec  30 sec  30 sec  4.75 sec     NT           -30 sec EC foam    NT     6MWT defer NT   600 feet  PO2 down to 94%  Pt needed sit rest due to  ft (limited by LBP) 660 ft 680 feet, pt used PPT while walking.   1 seated rest required at 4.5 minutes to perform seated lumbar flexion ex, then pt could continue.    Amairani   46/56  53/56 NA         PO2  96  HR 75 bpm           Precautions:   Past Medical History:   Diagnosis Date    Arthritis     osteo    Autoimmune hepatitis (HCC)     Back pain with radiation     to bilat legs    De Santiago esophagus     De Santiago's esophagus     Chronic GERD     Chronic uveitis     bilateral    CPAP (continuous positive airway pressure) dependence     Diabetes mellitus (HCC)     diet controlled    Disease of thyroid gland     hypo    Diverticulitis     Femur fracture (HCC)     Fibromyalgia syndrome     Fibromyalgia, primary     Fractures     Hiatal hernia     Hx of colonoscopy 2016    EGD done simultaneously    Hyperlipidemia     Hypertension     Infectious viral hepatitis     Osteoarthritis     PONV (postoperative nausea and vomiting)     Sinus congestion     Sleep apnea     CPAP rx, does not use mask

## 2024-06-12 ENCOUNTER — HOSPITAL ENCOUNTER (OUTPATIENT)
Dept: NON INVASIVE DIAGNOSTICS | Facility: HOSPITAL | Age: 84
Discharge: HOME | End: 2024-06-12
Payer: MEDICARE

## 2024-06-12 VITALS
DIASTOLIC BLOOD PRESSURE: 70 MMHG | BODY MASS INDEX: 33.56 KG/M2 | SYSTOLIC BLOOD PRESSURE: 122 MMHG | WEIGHT: 145 LBS | HEIGHT: 55 IN | HEART RATE: 69 BPM

## 2024-06-12 DIAGNOSIS — I10 ESSENTIAL (PRIMARY) HYPERTENSION: ICD-10-CM

## 2024-06-12 LAB
AORTIC ROOT: 2.8 CM
APICAL FOUR CHAMBER EJECTION FRACTION: 72 %
AV LVOT PEAK GRADIENT: 5 MMHG
AV PEAK GRADIENT: 11 MMHG
BSA FOR ECHO PROCEDURE: 1.53 M2
DOP CALC LVOT AREA: 2.83 CM2
DOP CALC LVOT DIAMETER: 1.9 CM
E WAVE DECELERATION TIME: 285 MS
E/A RATIO: 0.56
FRACTIONAL SHORTENING: 31 (ref 28–44)
INTERVENTRICULAR SEPTUM IN DIASTOLE (PARASTERNAL SHORT AXIS VIEW): 1.2 CM
INTERVENTRICULAR SEPTUM: 1.2 CM (ref 0.6–1.1)
LAAS-AP2: 17.4 CM2
LAAS-AP4: 16 CM2
LEFT ATRIUM SIZE: 3.1 CM
LEFT ATRIUM VOLUME (MOD BIPLANE): 44 ML
LEFT ATRIUM VOLUME INDEX (MOD BIPLANE): 28.6 ML/M2
LEFT INTERNAL DIMENSION IN SYSTOLE: 2.2 CM (ref 2.1–4)
LEFT VENTRICULAR INTERNAL DIMENSION IN DIASTOLE: 3.2 CM (ref 3.5–6)
LEFT VENTRICULAR POSTERIOR WALL IN END DIASTOLE: 1 CM
LEFT VENTRICULAR STROKE VOLUME: 25 ML
LVSV (TEICH): 25 ML
MV E'TISSUE VEL-SEP: 6 CM/S
MV PEAK A VEL: 1.3 M/S
MV PEAK E VEL: 73 CM/S
MV STENOSIS PRESSURE HALF TIME: 83 MS
MV VALVE AREA P 1/2 METHOD: 2.65
RIGHT ATRIUM AREA SYSTOLE A4C: 10.4 CM2
RIGHT VENTRICLE ID DIMENSION: 3.3 CM
SL CV LEFT ATRIUM LENGTH A2C: 4.8 CM
SL CV LV EF: 65
SL CV PED ECHO LEFT VENTRICLE DIASTOLIC VOLUME (MOD BIPLANE) 2D: 41 ML
SL CV PED ECHO LEFT VENTRICLE SYSTOLIC VOLUME (MOD BIPLANE) 2D: 17 ML
TR MAX PG: 16 MMHG
TR PEAK VELOCITY: 2 M/S
TRICUSPID ANNULAR PLANE SYSTOLIC EXCURSION: 1.7 CM
TRICUSPID VALVE PEAK REGURGITATION VELOCITY: 2.02 M/S

## 2024-06-12 PROCEDURE — 93306 TTE W/DOPPLER COMPLETE: CPT | Performed by: INTERNAL MEDICINE

## 2024-06-12 PROCEDURE — 93306 TTE W/DOPPLER COMPLETE: CPT

## 2024-06-17 DIAGNOSIS — E78.2 MIXED HYPERLIPIDEMIA: ICD-10-CM

## 2024-06-17 RX ORDER — ATORVASTATIN CALCIUM 10 MG/1
10 TABLET, FILM COATED ORAL DAILY
Qty: 90 TABLET | Refills: 1 | Status: SHIPPED | OUTPATIENT
Start: 2024-06-17

## 2024-08-20 RX ORDER — CARVEDILOL 3.12 MG/1
3.12 TABLET ORAL 2 TIMES DAILY WITH MEALS
Qty: 180 TABLET | Refills: 1 | OUTPATIENT
Start: 2024-08-20

## 2024-08-20 NOTE — TELEPHONE ENCOUNTER
Reason for call:   [x] Refill   [] Prior Auth  [] Other:     Office:   [x] PCP/Provider - Emma Napier/[facility]  [] Specialty/Provider -     Medication: Coreg    Dose/Frequency: 3.125 mg     Quantity: #180    Pharmacy: [facility]    Does the patient have enough for 3 days?   [] Yes   [x] No - Send as HP to POD

## 2024-08-21 NOTE — TELEPHONE ENCOUNTER
Pt called refill line wondering why script was denied. I explained that it appears she gets it from her Cardiologist and she will need to contact their office for a refill. Pt verbalized understanding,

## 2024-09-11 DIAGNOSIS — M25.519 CHRONIC SHOULDER PAIN, UNSPECIFIED LATERALITY: ICD-10-CM

## 2024-09-11 DIAGNOSIS — G89.29 CHRONIC SHOULDER PAIN, UNSPECIFIED LATERALITY: ICD-10-CM

## 2024-09-11 RX ORDER — GABAPENTIN 100 MG/1
CAPSULE ORAL
Qty: 90 CAPSULE | Refills: 1 | Status: SHIPPED | OUTPATIENT
Start: 2024-09-11

## 2024-10-08 DIAGNOSIS — I10 ESSENTIAL HYPERTENSION: ICD-10-CM

## 2024-10-09 RX ORDER — AMLODIPINE BESYLATE 5 MG/1
5 TABLET ORAL DAILY
Qty: 90 TABLET | Refills: 1 | Status: SHIPPED | OUTPATIENT
Start: 2024-10-09

## 2024-10-21 DIAGNOSIS — K21.00 GASTROESOPHAGEAL REFLUX DISEASE WITH ESOPHAGITIS WITHOUT HEMORRHAGE: ICD-10-CM

## 2024-10-21 DIAGNOSIS — I10 ESSENTIAL HYPERTENSION: ICD-10-CM

## 2024-10-22 RX ORDER — CARVEDILOL 12.5 MG/1
12.5 TABLET ORAL 2 TIMES DAILY WITH MEALS
Qty: 180 TABLET | Refills: 1 | Status: SHIPPED | OUTPATIENT
Start: 2024-10-22

## 2024-10-22 RX ORDER — FAMOTIDINE 20 MG/1
20 TABLET, FILM COATED ORAL 2 TIMES DAILY
Qty: 180 TABLET | Refills: 1 | Status: SHIPPED | OUTPATIENT
Start: 2024-10-22

## 2024-10-28 ENCOUNTER — APPOINTMENT (OUTPATIENT)
Dept: LAB | Facility: CLINIC | Age: 84
End: 2024-10-28
Payer: MEDICARE

## 2024-10-28 DIAGNOSIS — E78.2 MIXED HYPERLIPIDEMIA: ICD-10-CM

## 2024-10-28 DIAGNOSIS — I10 ESSENTIAL HYPERTENSION: ICD-10-CM

## 2024-10-28 DIAGNOSIS — E03.9 ACQUIRED HYPOTHYROIDISM: ICD-10-CM

## 2024-10-28 LAB
ALBUMIN SERPL BCG-MCNC: 4.1 G/DL (ref 3.5–5)
ALP SERPL-CCNC: 57 U/L (ref 34–104)
ALT SERPL W P-5'-P-CCNC: 15 U/L (ref 7–52)
ANION GAP SERPL CALCULATED.3IONS-SCNC: 8 MMOL/L (ref 4–13)
AST SERPL W P-5'-P-CCNC: 18 U/L (ref 13–39)
BILIRUB SERPL-MCNC: 0.46 MG/DL (ref 0.2–1)
BUN SERPL-MCNC: 21 MG/DL (ref 5–25)
CALCIUM SERPL-MCNC: 9.4 MG/DL (ref 8.4–10.2)
CHLORIDE SERPL-SCNC: 103 MMOL/L (ref 96–108)
CHOLEST SERPL-MCNC: 247 MG/DL
CO2 SERPL-SCNC: 30 MMOL/L (ref 21–32)
CREAT SERPL-MCNC: 0.62 MG/DL (ref 0.6–1.3)
ERYTHROCYTE [DISTWIDTH] IN BLOOD BY AUTOMATED COUNT: 13.1 % (ref 11.6–15.1)
GFR SERPL CREATININE-BSD FRML MDRD: 82 ML/MIN/1.73SQ M
GLUCOSE P FAST SERPL-MCNC: 88 MG/DL (ref 65–99)
HCT VFR BLD AUTO: 41.6 % (ref 34.8–46.1)
HDLC SERPL-MCNC: 49 MG/DL
HGB BLD-MCNC: 13.5 G/DL (ref 11.5–15.4)
LDLC SERPL CALC-MCNC: 175 MG/DL (ref 0–100)
MCH RBC QN AUTO: 28.2 PG (ref 26.8–34.3)
MCHC RBC AUTO-ENTMCNC: 32.5 G/DL (ref 31.4–37.4)
MCV RBC AUTO: 87 FL (ref 82–98)
PLATELET # BLD AUTO: 207 THOUSANDS/UL (ref 149–390)
PMV BLD AUTO: 12 FL (ref 8.9–12.7)
POTASSIUM SERPL-SCNC: 4.2 MMOL/L (ref 3.5–5.3)
PROT SERPL-MCNC: 6.8 G/DL (ref 6.4–8.4)
RBC # BLD AUTO: 4.78 MILLION/UL (ref 3.81–5.12)
SODIUM SERPL-SCNC: 141 MMOL/L (ref 135–147)
T4 FREE SERPL-MCNC: 1.07 NG/DL (ref 0.61–1.12)
TRIGL SERPL-MCNC: 115 MG/DL
TSH SERPL DL<=0.05 MIU/L-ACNC: 4.36 UIU/ML (ref 0.45–4.5)
WBC # BLD AUTO: 5.68 THOUSAND/UL (ref 4.31–10.16)

## 2024-10-28 PROCEDURE — 85027 COMPLETE CBC AUTOMATED: CPT

## 2024-10-28 PROCEDURE — 80061 LIPID PANEL: CPT

## 2024-10-28 PROCEDURE — 84439 ASSAY OF FREE THYROXINE: CPT

## 2024-10-28 PROCEDURE — 80053 COMPREHEN METABOLIC PANEL: CPT

## 2024-10-28 PROCEDURE — 36415 COLL VENOUS BLD VENIPUNCTURE: CPT

## 2024-10-28 PROCEDURE — 84443 ASSAY THYROID STIM HORMONE: CPT

## 2024-10-30 ENCOUNTER — TELEPHONE (OUTPATIENT)
Dept: ADMINISTRATIVE | Facility: OTHER | Age: 84
End: 2024-10-30

## 2024-10-30 NOTE — TELEPHONE ENCOUNTER
10/30/24 11:46 AM    Patient contacted to bring Advance Directive, POLST, or Living Will document to next scheduled pcp visit.[facility] Department left message.    Thank you.  Brittany King MA  [facility]

## 2024-10-31 DIAGNOSIS — E03.9 ACQUIRED HYPOTHYROIDISM: ICD-10-CM

## 2024-10-31 RX ORDER — LEVOTHYROXINE SODIUM 75 UG/1
75 TABLET ORAL EVERY MORNING
Qty: 90 TABLET | Refills: 1 | Status: SHIPPED | OUTPATIENT
Start: 2024-10-31

## 2024-11-01 ENCOUNTER — OFFICE VISIT (OUTPATIENT)
Dept: FAMILY MEDICINE CLINIC | Facility: CLINIC | Age: 84
End: 2024-11-01
Payer: MEDICARE

## 2024-11-01 VITALS
SYSTOLIC BLOOD PRESSURE: 134 MMHG | DIASTOLIC BLOOD PRESSURE: 72 MMHG | TEMPERATURE: 97.2 F | HEIGHT: 55 IN | HEART RATE: 72 BPM | BODY MASS INDEX: 33.88 KG/M2 | RESPIRATION RATE: 17 BRPM | WEIGHT: 146.4 LBS

## 2024-11-01 DIAGNOSIS — E78.2 MIXED HYPERLIPIDEMIA: ICD-10-CM

## 2024-11-01 DIAGNOSIS — E03.9 ACQUIRED HYPOTHYROIDISM: Primary | ICD-10-CM

## 2024-11-01 DIAGNOSIS — Z23 NEED FOR INFLUENZA VACCINATION: ICD-10-CM

## 2024-11-01 DIAGNOSIS — R07.89 CHEST PRESSURE: ICD-10-CM

## 2024-11-01 DIAGNOSIS — I10 ESSENTIAL HYPERTENSION: ICD-10-CM

## 2024-11-01 PROCEDURE — 99214 OFFICE O/P EST MOD 30 MIN: CPT | Performed by: FAMILY MEDICINE

## 2024-11-01 PROCEDURE — 90662 IIV NO PRSV INCREASED AG IM: CPT

## 2024-11-01 PROCEDURE — G0008 ADMIN INFLUENZA VIRUS VAC: HCPCS

## 2024-11-01 NOTE — ASSESSMENT & PLAN NOTE
She has been feeling better off of the atorvastatin.  Although, her cholesterol has gone up.  Will see how she can improve her levels with diet.  Will recheck labs in 4 months  Orders:    Comprehensive metabolic panel; Future    Lipid Panel with Direct LDL reflex; Future

## 2024-11-01 NOTE — ASSESSMENT & PLAN NOTE
Stable  Continue amlodipine 5 mg daily and lisinopril 40 mg daily  Orders:    CBC; Future    Comprehensive metabolic panel; Future    Lipid Panel with Direct LDL reflex; Future

## 2024-11-01 NOTE — ASSESSMENT & PLAN NOTE
Stable  Continue levothyroxine 75 mcg daily  Orders:    T4, free; Future    TSH, 3rd generation; Future

## 2024-11-01 NOTE — PROGRESS NOTES
"Ambulatory Visit  Name: Rosa Kapoor      : 1940      MRN: [id number]  Encounter Provider: Emma Napier DO  Encounter Date: 2024   Encounter department: [facility]    Assessment & Plan  Acquired hypothyroidism  Stable  Continue levothyroxine 75 mcg daily  Orders:    T4, free; Future    TSH, 3rd generation; Future    Essential hypertension  Stable  Continue amlodipine 5 mg daily and lisinopril 40 mg daily  Orders:    CBC; Future    Comprehensive metabolic panel; Future    Lipid Panel with Direct LDL reflex; Future    Mixed hyperlipidemia  She has been feeling better off of the atorvastatin.  Although, her cholesterol has gone up.  Will see how she can improve her levels with diet.  Will recheck labs in 4 months  Orders:    Comprehensive metabolic panel; Future    Lipid Panel with Direct LDL reflex; Future    Need for influenza vaccination    Orders:    influenza vaccine, high-dose, PF 0.7 mL (FLUZONE HIGH-DOSE)    Chest pressure  Has been going on for the past month.  Has appointment with her cardiologist next week.  Will follow-up with him for further testing.  Currently she is not having any chest pain        Return in about 4 months (around 3/1/2025) for Annual Wellness Visit.  History of Present Illness     She has been having episodes of chest pressure that lasts 15 minutes. It has been going on for a couple of months.  The episodes happen at rest.  She is not having any more shortness of breath than she normally does.  She has not been exercising due to her back pain.    Her family is also concerned about her anxiety levels.  She has had a lot of stress dealing with her 's illness.          Review of Systems        Objective     /72   Pulse 72   Temp (!) 97.2 °F (36.2 °C)   Resp 17   Ht 4' 7\" (1.397 m)   Wt 66.4 kg (146 lb 6.4 oz)   BMI 34.03 kg/m²     Physical Exam  Vitals and nursing note reviewed.   Constitutional:       General: She is not in acute distress.    "  Appearance: She is well-developed.   HENT:      Head: Normocephalic and atraumatic.      Right Ear: Tympanic membrane normal.      Left Ear: Tympanic membrane normal.   Cardiovascular:      Rate and Rhythm: Normal rate and regular rhythm.      Heart sounds: No murmur heard.  Pulmonary:      Effort: Pulmonary effort is normal. No respiratory distress.      Breath sounds: Normal breath sounds.   Musculoskeletal:      Cervical back: Neck supple.   Neurological:      Mental Status: She is alert.

## 2024-11-20 ENCOUNTER — OFFICE VISIT (OUTPATIENT)
Dept: URGENT CARE | Facility: CLINIC | Age: 84
End: 2024-11-20
Payer: MEDICARE

## 2024-11-20 ENCOUNTER — APPOINTMENT (OUTPATIENT)
Dept: RADIOLOGY | Facility: CLINIC | Age: 84
End: 2024-11-20
Payer: MEDICARE

## 2024-11-20 VITALS
BODY MASS INDEX: 31.14 KG/M2 | SYSTOLIC BLOOD PRESSURE: 167 MMHG | HEART RATE: 63 BPM | RESPIRATION RATE: 18 BRPM | TEMPERATURE: 97.6 F | WEIGHT: 148.38 LBS | OXYGEN SATURATION: 98 % | HEIGHT: 58 IN | DIASTOLIC BLOOD PRESSURE: 72 MMHG

## 2024-11-20 DIAGNOSIS — S49.91XA RIGHT SHOULDER INJURY, INITIAL ENCOUNTER: Primary | ICD-10-CM
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
## 2024-11-20 PROCEDURE — G2211 COMPLEX E/M VISIT ADD ON: HCPCS | Performed by: FAMILY MEDICINE

## 2024-11-20 PROCEDURE — 73030 X-RAY EXAM OF SHOULDER: CPT

## 2024-11-20 PROCEDURE — 99213 OFFICE O/P EST LOW 20 MIN: CPT | Performed by: FAMILY MEDICINE

## 2024-11-20 NOTE — PROGRESS NOTES
[facility]        NAME: Rosa Kapoor is a 84 y.o. female  : 1940    MRN: [id number]  DATE: 2024  TIME: 2:35 PM    Assessment and Plan   Right shoulder injury, initial encounter [S49.91XA]  1. Right shoulder injury, initial encounter  XR shoulder 2+ vw right    Ambulatory Referral to Physical Therapy    MRI shoulder right wo contrast        Right shoulder x-ray does not show any obvious fractures.  There is scattered calcifications around the humerus head and glenoid.  Denies any right axilla pain; pain appears to be confined to the deltoid region.    Placed in a right shoulder sling and referred to physical therapy for further evaluation and management.  Patient advised on taking Tylenol regularly and to ice the right shoulder.  MRI ordered to rule out rotator cuff injury.    Patient Instructions     Follow up with PCP in 3-5 days.  Proceed to  ER if symptoms worsen.    If tests have been performed at [facility], our office will contact you with results if changes need to be made to the care plan discussed with you at the visit.  You can review your full results on [facility] MyChart.    Chief Complaint     Chief Complaint   Patient presents with   • Fall     Patient was walking in her bedroom when she tripped and fell, landing on her right shoulder. Its painful to move arm         History of Present Illness       84-year-old right-hand-dominant female presents today due to a right shoulder injury sustained from a fall this morning.  While entering her bedroom, she tripped falling forward onto her right shoulder.  Since reports decreased active range of motion of the right shoulder, pain and weakness.  Denies any head trauma.  Fell onto her right knee as well, but denies any antalgic gait.    Fall  Pertinent negatives include no abdominal pain, fever, headaches or nausea.       Review of Systems   Review of Systems   Constitutional:  Negative for chills and fever.   Respiratory:   Negative for cough and shortness of breath.    Cardiovascular:  Negative for chest pain.   Gastrointestinal:  Negative for abdominal pain and nausea.   Musculoskeletal:  Positive for arthralgias.   Neurological:  Negative for dizziness and headaches.     Current Medications       Current Outpatient Medications:   •  acetaminophen (TYLENOL) 325 mg tablet, Take 650 mg by mouth every 6 (six) hours as needed for mild pain., Disp: , Rfl:   •  amLODIPine (NORVASC) 5 mg tablet, Take 1 tablet (5 mg total) by mouth daily, Disp: 90 tablet, Rfl: 1  •  carvedilol (COREG) 12.5 mg tablet, Take 1 tablet (12.5 mg total) by mouth 2 (two) times a day with meals, Disp: 180 tablet, Rfl: 1  •  carvedilol (COREG) 3.125 mg tablet, Take 3.125 mg by mouth 2 (two) times a day with meals, Disp: , Rfl:   •  famotidine (PEPCID) 20 mg tablet, Take 1 tablet (20 mg total) by mouth 2 (two) times a day, Disp: 180 tablet, Rfl: 1  •  gabapentin (NEURONTIN) 100 mg capsule, TAKE ONE CAPSULE BY MOUTH DAILY AT BED TIME (GENERIC FOR NEURONTIN), Disp: 90 capsule, Rfl: 1  •  levothyroxine 75 mcg tablet, Take 1 tablet (75 mcg total) by mouth every morning, Disp: 90 tablet, Rfl: 1  •  lisinopril (ZESTRIL) 40 mg tablet, TAKE ONE TABLET BY MOUTH EVERY DAY, Disp: 90 tablet, Rfl: 1  •  polyethylene glycol (MIRALAX) 17 g packet, Take 17 g by mouth every other day, Disp: , Rfl:     Current Allergies     Allergies as of 11/20/2024 - Reviewed 11/20/2024   Allergen Reaction Noted   • Biaxin [clarithromycin] GI Intolerance 08/17/2016   • Codeine Other (See Comments) 08/17/2016   • Latex Hives 08/17/2016   • Levaquin [levofloxacin in d5w] GI Intolerance 08/17/2016   • Morphine and codeine GI Intolerance 08/17/2016   • Nsaids GI Intolerance 08/17/2016   • Oxycodone Other (See Comments) 08/17/2016   • Percocet [oxycodone-acetaminophen] Other (See Comments) 08/17/2016   • Ultram [tramadol hcl] Other (See Comments) 08/17/2016            The following portions of the  patient's history were reviewed and updated as appropriate: allergies, current medications, past family history, past medical history, past social history, past surgical history and problem list.     Past Medical History:   Diagnosis Date   • Arthritis     osteo   • Autoimmune hepatitis (HCC)    • Back pain with radiation     to bilat legs   • De Santiago esophagus    • De Santiago's esophagus    • Chronic GERD    • Chronic uveitis     bilateral   • CPAP (continuous positive airway pressure) dependence    • Diabetes mellitus (HCC)     diet controlled   • Disease of thyroid gland     hypo   • Diverticulitis    • Femur fracture (HCC)    • Fibromyalgia syndrome    • Fibromyalgia, primary    • Fractures    • Hiatal hernia    • Hx of colonoscopy 2016    EGD done simultaneously   • Hyperlipidemia    • Hypertension    • Infectious viral hepatitis    • Osteoarthritis    • PONV (postoperative nausea and vomiting)    • Sinus congestion    • Sleep apnea     CPAP rx, does not use mask       Past Surgical History:   Procedure Laterality Date   • BILATERAL SALPINGOOPHORECTOMY  1996   • BREAST BIOPSY Right    • CARPAL TUNNEL RELEASE Right 2004   • CHOLECYSTECTOMY     • COLONOSCOPY     • FRACTURE SURGERY Right     ORIF femur, emily in place   • HAND SURGERY     • HIP SURGERY     • HYSTERECTOMY  1974    GOPAL   • JOINT REPLACEMENT Right 2004    knee   • JOINT REPLACEMENT Left 2007    knee   • JOINT REPLACEMENT Right 2011    hip   • JOINT REPLACEMENT Left 2017    knee   • KNEE SURGERY  2004,  2007   • SC XCAPSL CTRC RMVL INSJ IO LENS PROSTH W/O ECP Right 08/22/2016    Procedure: EXTRACTION EXTRACAPSULAR CATARACT PHACO INTRAOCULAR LENS (IOL);  Surgeon: Lionel Rudd MD;  Location: [facility] MAIN OR;  Service: Ophthalmology   • SC XCAPSL CTRC RMVL INSJ IO LENS PROSTH W/O ECP Left 11/26/2018    Procedure: EXTRACTION EXTRACAPSULAR CATARACT PHACO INTRAOCULAR LENS (IOL);  Surgeon: Lionel Rudd MD;  Location: [facility] MAIN OR;  Service: Ophthalmology   •  "UPPER GASTROINTESTINAL ENDOSCOPY         Family History   Problem Relation Age of Onset   • Heart disease Mother    • Diverticulitis Mother    • Stroke Father    • COPD Sister    • Liver disease Sister    • Anesthesia problems Sister    • Diabetes Brother    • Cancer Brother    • Cancer Maternal Grandmother         breast   • Pancreatic cancer Cousin    • Cancer Maternal Grandfather    • Cancer Paternal Uncle          Medications have been verified.        Objective   /72 (BP Location: Left arm, Patient Position: Sitting, Cuff Size: Standard)   Pulse 63   Temp 97.6 °F (36.4 °C) (Temporal)   Resp 18   Ht 4' 10\" (1.473 m)   Wt 67.3 kg (148 lb 6 oz)   SpO2 98%   BMI 31.01 kg/m²   No LMP recorded. Patient has had a hysterectomy.       Physical Exam     Physical Exam  Vitals and nursing note reviewed.   Constitutional:       General: She is in acute distress.      Appearance: Normal appearance. She is normal weight. She is not ill-appearing, toxic-appearing or diaphoretic.   HENT:      Head: Normocephalic and atraumatic.   Eyes:      General:         Right eye: No discharge.         Left eye: No discharge.      Conjunctiva/sclera: Conjunctivae normal.   Pulmonary:      Effort: Pulmonary effort is normal.   Musculoskeletal:         General: Tenderness and signs of injury present.   Skin:     General: Skin is warm.      Findings: No erythema.   Neurological:      General: No focal deficit present.      Mental Status: She is alert and oriented to person, place, and time.   Psychiatric:         Mood and Affect: Mood normal.         Behavior: Behavior normal.         Thought Content: Thought content normal.         Judgment: Judgment normal.                 "

## 2024-11-26 ENCOUNTER — TRANSCRIBE ORDERS (OUTPATIENT)
Dept: SCHEDULING | Age: 84
End: 2024-11-26

## 2024-11-26 DIAGNOSIS — S43.421A SPRAIN OF RIGHT ROTATOR CUFF CAPSULE, INITIAL ENCOUNTER: Primary | ICD-10-CM

## 2024-11-26 DIAGNOSIS — S40.011A CONTUSION OF RIGHT SHOULDER, INITIAL ENCOUNTER: ICD-10-CM

## 2024-12-03 DIAGNOSIS — I10 ESSENTIAL HYPERTENSION: ICD-10-CM

## 2024-12-03 RX ORDER — LISINOPRIL 40 MG/1
40 TABLET ORAL DAILY
Qty: 90 TABLET | Refills: 1 | Status: SHIPPED | OUTPATIENT
Start: 2024-12-03

## 2024-12-03 NOTE — TELEPHONE ENCOUNTER
Reason for call:   [x] Refill   [] Prior Auth  [] Other:     Office:   [x] PCP/Provider -   [] Specialty/Provider -     Medication:     lisinopril (ZESTRIL) 40 mg tablet       Dose/Frequency: TAKE ONE TABLET BY MOUTH EVERY DAY,     Quantity: 90 tab    Pharmacy:  [id number] - [place], [place] - [address] [place] [address] [address]   Does the patient have enough for 3 days?   [] Yes   [x] No - Send as HP to POD

## 2024-12-23 ENCOUNTER — TELEPHONE (OUTPATIENT)
Dept: PHYSICAL THERAPY | Facility: CLINIC | Age: 84
End: 2024-12-23

## 2024-12-23 NOTE — TELEPHONE ENCOUNTER
Patients daughter called and took out the first 3 appts on her schedule  - they are looking into home PT and will let us know as soon as they know.  Her dad is having other issues at this time too and it will be difficult for him to bring Rosa.

## 2025-01-02 ENCOUNTER — TELEPHONE (OUTPATIENT)
Dept: PHYSICAL THERAPY | Facility: CLINIC | Age: 85
End: 2025-01-02

## 2025-02-17 ENCOUNTER — APPOINTMENT (OUTPATIENT)
Dept: LAB | Facility: CLINIC | Age: 85
End: 2025-02-17
Payer: MEDICARE

## 2025-02-17 DIAGNOSIS — I10 ESSENTIAL HYPERTENSION: ICD-10-CM

## 2025-02-17 DIAGNOSIS — E03.9 ACQUIRED HYPOTHYROIDISM: ICD-10-CM

## 2025-02-17 DIAGNOSIS — E78.2 MIXED HYPERLIPIDEMIA: ICD-10-CM

## 2025-02-17 LAB
ALBUMIN SERPL BCG-MCNC: 4.1 G/DL (ref 3.5–5)
ALP SERPL-CCNC: 63 U/L (ref 34–104)
ALT SERPL W P-5'-P-CCNC: 17 U/L (ref 7–52)
ANION GAP SERPL CALCULATED.3IONS-SCNC: 9 MMOL/L (ref 4–13)
AST SERPL W P-5'-P-CCNC: 19 U/L (ref 13–39)
BILIRUB SERPL-MCNC: 0.42 MG/DL (ref 0.2–1)
BUN SERPL-MCNC: 20 MG/DL (ref 5–25)
CALCIUM SERPL-MCNC: 9.4 MG/DL (ref 8.4–10.2)
CHLORIDE SERPL-SCNC: 102 MMOL/L (ref 96–108)
CHOLEST SERPL-MCNC: 190 MG/DL (ref ?–200)
CO2 SERPL-SCNC: 29 MMOL/L (ref 21–32)
CREAT SERPL-MCNC: 0.54 MG/DL (ref 0.6–1.3)
ERYTHROCYTE [DISTWIDTH] IN BLOOD BY AUTOMATED COUNT: 13.3 % (ref 11.6–15.1)
GFR SERPL CREATININE-BSD FRML MDRD: 86 ML/MIN/1.73SQ M
GLUCOSE P FAST SERPL-MCNC: 97 MG/DL (ref 65–99)
HCT VFR BLD AUTO: 41.2 % (ref 34.8–46.1)
HDLC SERPL-MCNC: 54 MG/DL
HGB BLD-MCNC: 13 G/DL (ref 11.5–15.4)
LDLC SERPL CALC-MCNC: 114 MG/DL (ref 0–100)
MCH RBC QN AUTO: 27.7 PG (ref 26.8–34.3)
MCHC RBC AUTO-ENTMCNC: 31.6 G/DL (ref 31.4–37.4)
MCV RBC AUTO: 88 FL (ref 82–98)
PLATELET # BLD AUTO: 222 THOUSANDS/UL (ref 149–390)
PMV BLD AUTO: 11.5 FL (ref 8.9–12.7)
POTASSIUM SERPL-SCNC: 4.3 MMOL/L (ref 3.5–5.3)
PROT SERPL-MCNC: 6.8 G/DL (ref 6.4–8.4)
RBC # BLD AUTO: 4.69 MILLION/UL (ref 3.81–5.12)
SODIUM SERPL-SCNC: 140 MMOL/L (ref 135–147)
T4 FREE SERPL-MCNC: 1.02 NG/DL (ref 0.61–1.12)
TRIGL SERPL-MCNC: 108 MG/DL (ref ?–150)
TSH SERPL DL<=0.05 MIU/L-ACNC: 5.01 UIU/ML (ref 0.45–4.5)
WBC # BLD AUTO: 6.03 THOUSAND/UL (ref 4.31–10.16)

## 2025-02-17 PROCEDURE — 80061 LIPID PANEL: CPT

## 2025-02-17 PROCEDURE — 84439 ASSAY OF FREE THYROXINE: CPT

## 2025-02-17 PROCEDURE — 36415 COLL VENOUS BLD VENIPUNCTURE: CPT

## 2025-02-17 PROCEDURE — 85027 COMPLETE CBC AUTOMATED: CPT

## 2025-02-17 PROCEDURE — 80053 COMPREHEN METABOLIC PANEL: CPT

## 2025-02-17 PROCEDURE — 84443 ASSAY THYROID STIM HORMONE: CPT

## 2025-02-28 ENCOUNTER — TELEPHONE (OUTPATIENT)
Age: 85
End: 2025-02-28

## 2025-02-28 ENCOUNTER — OFFICE VISIT (OUTPATIENT)
Dept: FAMILY MEDICINE CLINIC | Facility: CLINIC | Age: 85
End: 2025-02-28
Payer: MEDICARE

## 2025-02-28 ENCOUNTER — TRANSCRIBE ORDERS (OUTPATIENT)
Dept: SCHEDULING | Age: 85
End: 2025-02-28

## 2025-02-28 VITALS
DIASTOLIC BLOOD PRESSURE: 80 MMHG | SYSTOLIC BLOOD PRESSURE: 140 MMHG | WEIGHT: 149 LBS | HEIGHT: 56 IN | HEART RATE: 76 BPM | TEMPERATURE: 98.2 F | BODY MASS INDEX: 33.52 KG/M2

## 2025-02-28 DIAGNOSIS — I10 ESSENTIAL HYPERTENSION: Primary | ICD-10-CM

## 2025-02-28 DIAGNOSIS — R07.89 OTHER CHEST PAIN: ICD-10-CM

## 2025-02-28 DIAGNOSIS — I10 ESSENTIAL (PRIMARY) HYPERTENSION: Primary | ICD-10-CM

## 2025-02-28 DIAGNOSIS — K75.4 AUTOIMMUNE HEPATITIS (HCC): ICD-10-CM

## 2025-02-28 DIAGNOSIS — E78.2 MIXED HYPERLIPIDEMIA: ICD-10-CM

## 2025-02-28 DIAGNOSIS — M25.519 CHRONIC SHOULDER PAIN, UNSPECIFIED LATERALITY: ICD-10-CM

## 2025-02-28 DIAGNOSIS — E03.9 ACQUIRED HYPOTHYROIDISM: ICD-10-CM

## 2025-02-28 DIAGNOSIS — G89.29 CHRONIC SHOULDER PAIN, UNSPECIFIED LATERALITY: ICD-10-CM

## 2025-02-28 PROCEDURE — 99214 OFFICE O/P EST MOD 30 MIN: CPT | Performed by: FAMILY MEDICINE

## 2025-02-28 PROCEDURE — G2211 COMPLEX E/M VISIT ADD ON: HCPCS | Performed by: FAMILY MEDICINE

## 2025-02-28 RX ORDER — EZETIMIBE 10 MG/1
10 TABLET ORAL DAILY
COMMUNITY
Start: 2024-11-06 | End: 2025-11-06

## 2025-02-28 RX ORDER — GABAPENTIN 100 MG/1
100 CAPSULE ORAL
Qty: 90 CAPSULE | Refills: 1 | Status: SHIPPED | OUTPATIENT
Start: 2025-02-28 | End: 2025-03-03

## 2025-02-28 NOTE — ASSESSMENT & PLAN NOTE
Improved  Continue Zetia 10 mg daily  Orders:  •  Comprehensive metabolic panel; Future  •  Lipid Panel with Direct LDL reflex; Future

## 2025-02-28 NOTE — ASSESSMENT & PLAN NOTE
Blood pressure is well-controlled  Continue amlodipine 5 mg daily, carvedilol 12.5 mg twice daily and lisinopril 40 mg daily  Orders:  •  CBC; Future  •  Comprehensive metabolic panel; Future

## 2025-02-28 NOTE — ASSESSMENT & PLAN NOTE
TSH is slightly elevated but her free T4 is normal.  Continue levothyroxine 75 mcg daily  Orders:  •  T4, free; Future  •  TSH, 3rd generation; Future

## 2025-02-28 NOTE — TELEPHONE ENCOUNTER
Patients GI provider:  Dr. Bruno    Number to return call: [phone]    Reason for call: Pt called for next EGD. Not until [id number]    Scheduled procedure/appointment date if applicable: Apt/procedure N/A

## 2025-02-28 NOTE — PROGRESS NOTES
"Name: Rosa Kapoor      : 1940      MRN: [id number]  Encounter Provider: Emma Napier DO  Encounter Date: 2025   Encounter department: [facility]  :  Assessment & Plan  Essential hypertension  Blood pressure is well-controlled  Continue amlodipine 5 mg daily, carvedilol 12.5 mg twice daily and lisinopril 40 mg daily  Orders:  •  CBC; Future  •  Comprehensive metabolic panel; Future    Autoimmune hepatitis (HCC)  Liver enzymes are stable  Orders:  •  Comprehensive metabolic panel; Future    Mixed hyperlipidemia  Improved  Continue Zetia 10 mg daily  Orders:  •  Comprehensive metabolic panel; Future  •  Lipid Panel with Direct LDL reflex; Future    Acquired hypothyroidism  TSH is slightly elevated but her free T4 is normal.  Continue levothyroxine 75 mcg daily  Orders:  •  T4, free; Future  •  TSH, 3rd generation; Future    Chronic shoulder pain, unspecified laterality  Patient is functioning with her current level of pain  Orders:  •  gabapentin (NEURONTIN) 100 mg capsule; Take 1 capsule (100 mg total) by mouth daily at bedtime      Return in about 6 months (around 2025) for Next scheduled follow up.    Encouraged to get the stress test done recommended by cardiologist      History of Present Illness   She has not gotten the stress test was recommended by the cardiologist.  She has not had any further episodes of chest pain.  She is considering in the future though possibly getting shoulder surgery.  Otherwise she has been doing well at home.  She has a lot of support from her daughters.      Review of Systems    Objective   /80   Pulse 76   Temp 98.2 °F (36.8 °C)   Ht 4' 7.91\" (1.42 m)   Wt 67.6 kg (149 lb)   BMI 33.52 kg/m²      Physical Exam  Vitals and nursing note reviewed.   Constitutional:       General: She is not in acute distress.     Appearance: She is well-developed.   HENT:      Head: Normocephalic and atraumatic.      Right Ear: Tympanic membrane normal.      " Left Ear: Tympanic membrane normal.   Cardiovascular:      Rate and Rhythm: Normal rate and regular rhythm.      Heart sounds: No murmur heard.  Pulmonary:      Effort: Pulmonary effort is normal. No respiratory distress.      Breath sounds: Normal breath sounds.   Musculoskeletal:      Cervical back: Neck supple.   Neurological:      Mental Status: She is alert.

## 2025-03-03 DIAGNOSIS — G89.29 CHRONIC SHOULDER PAIN, UNSPECIFIED LATERALITY: ICD-10-CM

## 2025-03-03 DIAGNOSIS — M25.519 CHRONIC SHOULDER PAIN, UNSPECIFIED LATERALITY: ICD-10-CM

## 2025-03-03 RX ORDER — GABAPENTIN 100 MG/1
100 CAPSULE ORAL
Qty: 90 CAPSULE | Refills: 1 | Status: SHIPPED | OUTPATIENT
Start: 2025-03-03

## 2025-03-12 DIAGNOSIS — I10 ESSENTIAL HYPERTENSION: ICD-10-CM

## 2025-03-13 RX ORDER — LISINOPRIL 40 MG/1
40 TABLET ORAL DAILY
Qty: 90 TABLET | Refills: 0 | OUTPATIENT
Start: 2025-03-13

## 2025-03-17 DIAGNOSIS — I10 ESSENTIAL HYPERTENSION: ICD-10-CM

## 2025-03-17 NOTE — TELEPHONE ENCOUNTER
Message sent via [facility].   lisinopril 40 mg tablet   Hi Dr BRANDON,  I hope all is well and you had a nice time with your mom at [facility].  :-)   All is copacetic here -- just an issue with my mom's refill (didn't make sense since she had one refill left).  Apparently the pharmacist reached out to your office a couple of times.  If you haven't connected with him yet, please know that he ended up refilling it so she wouldn't be without it.  If you haven't already done so, It would be great if you could renew the script so we don't have another issue in 90-days.  NOTE:  I noticed that the CHANGE IN PHARMACY did not take in [facility].  I know you made the change when we were there last month.  The correct pharmacy is:  [facility], [place].  Thanks! Deana

## 2025-03-18 RX ORDER — LISINOPRIL 40 MG/1
40 TABLET ORAL DAILY
Qty: 90 TABLET | Refills: 1 | Status: SHIPPED | OUTPATIENT
Start: 2025-03-18

## 2025-04-05 DIAGNOSIS — I10 ESSENTIAL HYPERTENSION: ICD-10-CM

## 2025-04-06 RX ORDER — AMLODIPINE BESYLATE 5 MG/1
5 TABLET ORAL DAILY
Qty: 90 TABLET | Refills: 1 | Status: SHIPPED | OUTPATIENT
Start: 2025-04-06 | End: 2025-04-07 | Stop reason: SDUPTHER

## 2025-04-07 DIAGNOSIS — I10 ESSENTIAL HYPERTENSION: ICD-10-CM

## 2025-04-08 RX ORDER — AMLODIPINE BESYLATE 5 MG/1
5 TABLET ORAL DAILY
Qty: 90 TABLET | Refills: 1 | Status: SHIPPED | OUTPATIENT
Start: 2025-04-08

## 2025-04-09 NOTE — TELEPHONE ENCOUNTER
Deana called in regards to moms amlodipine. She needs this to go to [facility] pharmacy in [place]. Advised that the prescription was cancelled at [facility] and re-ordered to [facility].   No further action required.

## 2025-04-22 DIAGNOSIS — E03.9 ACQUIRED HYPOTHYROIDISM: ICD-10-CM

## 2025-04-23 RX ORDER — LEVOTHYROXINE SODIUM 75 UG/1
75 TABLET ORAL EVERY MORNING
Qty: 90 TABLET | Refills: 1 | Status: SHIPPED | OUTPATIENT
Start: 2025-04-23

## 2025-05-19 DIAGNOSIS — I10 ESSENTIAL HYPERTENSION: ICD-10-CM

## 2025-05-20 RX ORDER — CARVEDILOL 12.5 MG/1
12.5 TABLET ORAL 2 TIMES DAILY
Qty: 180 TABLET | Refills: 1 | Status: SHIPPED | OUTPATIENT
Start: 2025-05-20

## 2025-08-12 ENCOUNTER — APPOINTMENT (OUTPATIENT)
Dept: LAB | Facility: CLINIC | Age: 85
End: 2025-08-12
Attending: FAMILY MEDICINE
Payer: MEDICARE

## (undated) DEVICE — THE MONARCH® "D" CARTRIDGE IS A SINGLE-USE POLYPROPYLENE CARTRIDGE FOR POSTERIOR CHAMBER IOL DELIVERY: Brand: MONARCH® III

## (undated) DEVICE — CLEARCUT® SLIT KNIFE INTREPID MICRO-COAXIAL SYSTEM 2.4 SB: Brand: CLEARCUT®; INTREPID

## (undated) DEVICE — AIR INJECT CANNULA 27GA: Brand: OPHTHALMIC CANNULA

## (undated) DEVICE — ACTIVE FMS W/ INTREPID* ULTRA SLEEVES, 0.9MM 45° ABS* INTREPID* BALANCED TIP: Brand: ALCON

## (undated) DEVICE — MICROSURGICAL INSTRUMENT IRR. CYSTITOME 25GA STRAIGHT-REVERSE CUTTING: Brand: ALCON

## (undated) DEVICE — GLOVE SRG BIOGEL 7.5

## (undated) DEVICE — B-H IRRIGATING CAN 19GA FLAT ANGLED 8MM: Brand: OPHTHALMIC CANNULA

## (undated) DEVICE — EYE PACK CUSTOM -FINNEGAN

## (undated) DEVICE — INTREPID® TRANSFORMER IA HP: Brand: INTREPID®